# Patient Record
Sex: FEMALE | Race: WHITE | NOT HISPANIC OR LATINO | URBAN - METROPOLITAN AREA
[De-identification: names, ages, dates, MRNs, and addresses within clinical notes are randomized per-mention and may not be internally consistent; named-entity substitution may affect disease eponyms.]

---

## 2022-09-12 ENCOUNTER — INPATIENT (INPATIENT)
Facility: HOSPITAL | Age: 87
LOS: 3 days | Discharge: NURSING, REHAB OR LONG-TERM CARE | End: 2022-09-16
Attending: INTERNAL MEDICINE | Admitting: INTERNAL MEDICINE

## 2022-09-12 VITALS
OXYGEN SATURATION: 100 % | HEART RATE: 100 BPM | TEMPERATURE: 98 F | DIASTOLIC BLOOD PRESSURE: 85 MMHG | SYSTOLIC BLOOD PRESSURE: 127 MMHG | RESPIRATION RATE: 16 BRPM

## 2022-09-12 LAB
ALBUMIN SERPL ELPH-MCNC: 4.1 G/DL — SIGNIFICANT CHANGE UP (ref 3.5–5.2)
ALP SERPL-CCNC: 136 U/L — HIGH (ref 30–115)
ALT FLD-CCNC: <5 U/L — SIGNIFICANT CHANGE UP (ref 0–41)
ANION GAP SERPL CALC-SCNC: 10 MMOL/L — SIGNIFICANT CHANGE UP (ref 7–14)
APPEARANCE UR: ABNORMAL
AST SERPL-CCNC: 64 U/L — HIGH (ref 0–41)
BACTERIA # UR AUTO: NEGATIVE — SIGNIFICANT CHANGE UP
BASOPHILS # BLD AUTO: 0.03 K/UL — SIGNIFICANT CHANGE UP (ref 0–0.2)
BASOPHILS NFR BLD AUTO: 0.2 % — SIGNIFICANT CHANGE UP (ref 0–1)
BILIRUB SERPL-MCNC: 0.7 MG/DL — SIGNIFICANT CHANGE UP (ref 0.2–1.2)
BILIRUB UR-MCNC: NEGATIVE — SIGNIFICANT CHANGE UP
BUN SERPL-MCNC: 19 MG/DL — SIGNIFICANT CHANGE UP (ref 10–20)
C DIFF BY PCR RESULT: NEGATIVE — SIGNIFICANT CHANGE UP
C DIFF TOX GENS STL QL NAA+PROBE: SIGNIFICANT CHANGE UP
CALCIUM SERPL-MCNC: 9.2 MG/DL — SIGNIFICANT CHANGE UP (ref 8.4–10.5)
CHLORIDE SERPL-SCNC: 100 MMOL/L — SIGNIFICANT CHANGE UP (ref 98–110)
CO2 SERPL-SCNC: 24 MMOL/L — SIGNIFICANT CHANGE UP (ref 17–32)
COLOR SPEC: YELLOW — SIGNIFICANT CHANGE UP
CREAT SERPL-MCNC: 1.1 MG/DL — SIGNIFICANT CHANGE UP (ref 0.7–1.5)
DIFF PNL FLD: ABNORMAL
EGFR: 50 ML/MIN/1.73M2 — LOW
EOSINOPHIL # BLD AUTO: 0.02 K/UL — SIGNIFICANT CHANGE UP (ref 0–0.7)
EOSINOPHIL NFR BLD AUTO: 0.1 % — SIGNIFICANT CHANGE UP (ref 0–8)
EPI CELLS # UR: 3 /HPF — SIGNIFICANT CHANGE UP (ref 0–5)
GLUCOSE SERPL-MCNC: 115 MG/DL — HIGH (ref 70–99)
GLUCOSE UR QL: ABNORMAL
HCT VFR BLD CALC: 43.3 % — SIGNIFICANT CHANGE UP (ref 37–47)
HGB BLD-MCNC: 14.7 G/DL — SIGNIFICANT CHANGE UP (ref 12–16)
HYALINE CASTS # UR AUTO: 7 /LPF — SIGNIFICANT CHANGE UP (ref 0–7)
IMM GRANULOCYTES NFR BLD AUTO: 0.4 % — HIGH (ref 0.1–0.3)
KETONES UR-MCNC: SIGNIFICANT CHANGE UP
LACTATE SERPL-SCNC: 1.7 MMOL/L — SIGNIFICANT CHANGE UP (ref 0.7–2)
LEUKOCYTE ESTERASE UR-ACNC: ABNORMAL
LIDOCAIN IGE QN: 34 U/L — SIGNIFICANT CHANGE UP (ref 7–60)
LYMPHOCYTES # BLD AUTO: 0.36 K/UL — LOW (ref 1.2–3.4)
LYMPHOCYTES # BLD AUTO: 2.7 % — LOW (ref 20.5–51.1)
MCHC RBC-ENTMCNC: 29.7 PG — SIGNIFICANT CHANGE UP (ref 27–31)
MCHC RBC-ENTMCNC: 33.9 G/DL — SIGNIFICANT CHANGE UP (ref 32–37)
MCV RBC AUTO: 87.5 FL — SIGNIFICANT CHANGE UP (ref 81–99)
MONOCYTES # BLD AUTO: 0.74 K/UL — HIGH (ref 0.1–0.6)
MONOCYTES NFR BLD AUTO: 5.5 % — SIGNIFICANT CHANGE UP (ref 1.7–9.3)
NEUTROPHILS # BLD AUTO: 12.18 K/UL — HIGH (ref 1.4–6.5)
NEUTROPHILS NFR BLD AUTO: 91.1 % — HIGH (ref 42.2–75.2)
NITRITE UR-MCNC: NEGATIVE — SIGNIFICANT CHANGE UP
NRBC # BLD: 0 /100 WBCS — SIGNIFICANT CHANGE UP (ref 0–0)
PH UR: 6 — SIGNIFICANT CHANGE UP (ref 5–8)
PLATELET # BLD AUTO: 181 K/UL — SIGNIFICANT CHANGE UP (ref 130–400)
POTASSIUM SERPL-MCNC: SIGNIFICANT CHANGE UP MMOL/L (ref 3.5–5)
POTASSIUM SERPL-SCNC: SIGNIFICANT CHANGE UP MMOL/L (ref 3.5–5)
PROT SERPL-MCNC: 7.9 G/DL — SIGNIFICANT CHANGE UP (ref 6–8)
PROT UR-MCNC: ABNORMAL
RBC # BLD: 4.95 M/UL — SIGNIFICANT CHANGE UP (ref 4.2–5.4)
RBC # FLD: 14.9 % — HIGH (ref 11.5–14.5)
RBC CASTS # UR COMP ASSIST: 27 /HPF — HIGH (ref 0–4)
SARS-COV-2 RNA SPEC QL NAA+PROBE: DETECTED
SODIUM SERPL-SCNC: 134 MMOL/L — LOW (ref 135–146)
SP GR SPEC: 1.02 — SIGNIFICANT CHANGE UP (ref 1.01–1.03)
UROBILINOGEN FLD QL: ABNORMAL
WBC # BLD: 13.38 K/UL — HIGH (ref 4.8–10.8)
WBC # FLD AUTO: 13.38 K/UL — HIGH (ref 4.8–10.8)
WBC UR QL: 43 /HPF — HIGH (ref 0–5)

## 2022-09-12 PROCEDURE — 99283 EMERGENCY DEPT VISIT LOW MDM: CPT

## 2022-09-12 PROCEDURE — 99223 1ST HOSP IP/OBS HIGH 75: CPT

## 2022-09-12 PROCEDURE — 74177 CT ABD & PELVIS W/CONTRAST: CPT | Mod: 26,MA

## 2022-09-12 RX ORDER — CIPROFLOXACIN LACTATE 400MG/40ML
400 VIAL (ML) INTRAVENOUS ONCE
Refills: 0 | Status: COMPLETED | OUTPATIENT
Start: 2022-09-12 | End: 2022-09-12

## 2022-09-12 RX ORDER — SODIUM CHLORIDE 9 MG/ML
1000 INJECTION INTRAMUSCULAR; INTRAVENOUS; SUBCUTANEOUS ONCE
Refills: 0 | Status: COMPLETED | OUTPATIENT
Start: 2022-09-12 | End: 2022-09-12

## 2022-09-12 RX ORDER — METRONIDAZOLE 500 MG
500 TABLET ORAL ONCE
Refills: 0 | Status: COMPLETED | OUTPATIENT
Start: 2022-09-12 | End: 2022-09-12

## 2022-09-12 RX ADMIN — SODIUM CHLORIDE 1000 MILLILITER(S): 9 INJECTION INTRAMUSCULAR; INTRAVENOUS; SUBCUTANEOUS at 14:38

## 2022-09-12 RX ADMIN — Medication 100 MILLIGRAM(S): at 18:12

## 2022-09-12 RX ADMIN — Medication 200 MILLIGRAM(S): at 18:12

## 2022-09-12 NOTE — CONSULT NOTE ADULT - SUBJECTIVE AND OBJECTIVE BOX
GENERAL SURGERY CONSULT NOTE    Patient: SULEMA POZO , 86y (39)Female   MRN: [id number]  Location: [facility] 021 A  Visit: 22 Inpatient  Date: 22 @ 20:20    HPI:  87 y/o female unknown medical history presents to ED c/o diarrhea. Patient is a poor historian. reports she takes medicine on a daily basis but is not sure for what. states she lives at home alone. She denies any abdominal pain or back pain. States that she was not aware of the thoracic AA before.     PAST MEDICAL & SURGICAL HISTORY:  unknown    Home Medications:  unknown    VITALS:  T(F): 96.1 (22 @ 15:45), Max: 97.6 (22 @ 13:02)  HR: 96 (22 @ 15:45) (96 - 100)  BP: 145/88 (22 @ 15:45) (127/85 - 145/88)  RR: 16 (22 @ 15:45) (16 - 16)  SpO2: 96% (22 @ 15:45) (96% - 100%)    PHYSICAL EXAM:  General: NAD,  Cardiac: RRR S1, S2,  Respiratory: CTAB, normal respiratory effort, breath sounds equal BL,   Abdomen: Soft, non-distended, non-tender,   Vascular: Pulses 2+ throughout, extremities well perfused  Skin: Warm/dry, normal color, no jaundice    MEDICATIONS  (STANDING):    MEDICATIONS  (PRN):      LAB/STUDIES:                        14.7   13.38 )-----------( 181      ( 12 Sep 2022 14:58 )             43.3         134<L>  |  100  |  19  ----------------------------<  115<H>  TNP   |  24  |  1.1    Ca    9.2      12 Sep 2022 14:58    TPro  7.9  /  Alb  4.1  /  TBili  0.7  /  DBili  x   /  AST  64<H>  /  ALT  <5  /  AlkPhos  136<H>  12      LIVER FUNCTIONS - ( 12 Sep 2022 14:58 )  Alb: 4.1 g/dL / Pro: 7.9 g/dL / ALK PHOS: 136 U/L / ALT: <5 U/L / AST: 64 U/L / GGT: x           Urinalysis Basic - ( 12 Sep 2022 14:49 )    Color: Yellow / Appearance: Slightly Turbid / S.018 / pH: x  Gluc: x / Ketone: Trace  / Bili: Negative / Urobili: 3 mg/dL   Blood: x / Protein: 100 mg/dL / Nitrite: Negative   Leuk Esterase: Large / RBC: 27 /HPF / WBC 43 /HPF   Sq Epi: x / Non Sq Epi: 3 /HPF / Bacteria: Negative    IMAGING:    < from: CT Abdomen and Pelvis w/ IV Cont (22 @ 15:31) >  IMPRESSION:    Colitis involving the descending and sigmoid colon.    Patchy hypoenhancement of the left kidney. Differential includes acute   pyelonephritis versus renal infarcts. Clinical correlation is needed.    Partially imaged descending thoracic aorta is aneurysmal measuring up to   5.7 cm. Abdominal aorta is also aneurysmal measuring up to 4.3 cm.   Extensive mural plaque is noted throughout the aorta. Recommend chest CT   follow-up to fully assess the thoracic aorta.    Partially imaged moderate pericardial effusion.    Partially imaged bilateral pleural effusions with compressive atelectasis.    Hypoattenuating rounded 1.4 cm uterine lesion, indeterminate. Bilateral   adnexal cysts measuring up to 3.2 cm on the right. Recommend evaluation   with pelvic sonography.    Indeterminate 1.5 cm hypoattenuating splenic lesion. This can be followed   up with outpatient contrast-enhanced MRI.    < end of copied text >    ACCESS DEVICES:  [x ] Peripheral IV  [ ] Central Venous Line	[ ] R	[ ] L	[ ] IJ	[ ] Fem	[ ] SC	Placed:   [ ] Arterial Line		[ ] R	[ ] L	[ ] Fem	[ ] Rad	[ ] Ax	Placed:   [ ] PICC:					[ ] Mediport  [ ] Urinary Catheter, Date Placed:

## 2022-09-12 NOTE — CONSULT NOTE ADULT - ASSESSMENT
ASSESSMENT:  87 y/o female unknown medical history presents to ED c/o diarrhea.   She was found to have colitis.   CTAP also shows  thoracic aortic aneurysm 5.7cm, Abdominal aorta is also aneurysmal measuring up to 4.3 cm.     PLAN:  - medical management for colitis   - attending to review the imaging   - will continue to follow     Above plan discussed with vascular fellow Dr Heller, patient, and Primary team  09-12-22 @ 20:20

## 2022-09-12 NOTE — ED PROVIDER NOTE - OBJECTIVE STATEMENT
86-year-old female to ED with abdominal pain nausea vomiting and diarrhea.  Patient brought into ED for evaluation at this persistent symptoms.  No fevers no sick contacts no injury or fall patient states she has been and has been having worsening symptoms over the called ambulance to come to the hospital.

## 2022-09-13 LAB
A1C WITH ESTIMATED AVERAGE GLUCOSE RESULT: 5.3 % — SIGNIFICANT CHANGE UP (ref 4–5.6)
ALBUMIN SERPL ELPH-MCNC: 3.6 G/DL — SIGNIFICANT CHANGE UP (ref 3.5–5.2)
ALP SERPL-CCNC: 127 U/L — HIGH (ref 30–115)
ALT FLD-CCNC: 10 U/L — SIGNIFICANT CHANGE UP (ref 0–41)
ANION GAP SERPL CALC-SCNC: 11 MMOL/L — SIGNIFICANT CHANGE UP (ref 7–14)
AST SERPL-CCNC: 17 U/L — SIGNIFICANT CHANGE UP (ref 0–41)
BASOPHILS # BLD AUTO: 0.04 K/UL — SIGNIFICANT CHANGE UP (ref 0–0.2)
BASOPHILS NFR BLD AUTO: 0.6 % — SIGNIFICANT CHANGE UP (ref 0–1)
BILIRUB SERPL-MCNC: 0.7 MG/DL — SIGNIFICANT CHANGE UP (ref 0.2–1.2)
BUN SERPL-MCNC: 14 MG/DL — SIGNIFICANT CHANGE UP (ref 10–20)
CALCIUM SERPL-MCNC: 8.5 MG/DL — SIGNIFICANT CHANGE UP (ref 8.4–10.5)
CHLORIDE SERPL-SCNC: 104 MMOL/L — SIGNIFICANT CHANGE UP (ref 98–110)
CHOLEST SERPL-MCNC: 195 MG/DL — SIGNIFICANT CHANGE UP
CO2 SERPL-SCNC: 24 MMOL/L — SIGNIFICANT CHANGE UP (ref 17–32)
CREAT SERPL-MCNC: 0.9 MG/DL — SIGNIFICANT CHANGE UP (ref 0.7–1.5)
CRP SERPL-MCNC: 28.8 MG/L — HIGH
D DIMER BLD IA.RAPID-MCNC: 7717 NG/ML DDU — HIGH (ref 0–230)
EGFR: 63 ML/MIN/1.73M2 — SIGNIFICANT CHANGE UP
EOSINOPHIL # BLD AUTO: 0.11 K/UL — SIGNIFICANT CHANGE UP (ref 0–0.7)
EOSINOPHIL NFR BLD AUTO: 1.5 % — SIGNIFICANT CHANGE UP (ref 0–8)
ERYTHROCYTE [SEDIMENTATION RATE] IN BLOOD: 5 MM/HR — SIGNIFICANT CHANGE UP (ref 0–20)
ESTIMATED AVERAGE GLUCOSE: 105 MG/DL — SIGNIFICANT CHANGE UP (ref 68–114)
FERRITIN SERPL-MCNC: 212 NG/ML — HIGH (ref 15–150)
GLUCOSE SERPL-MCNC: 118 MG/DL — HIGH (ref 70–99)
HCT VFR BLD CALC: 38.7 % — SIGNIFICANT CHANGE UP (ref 37–47)
HDLC SERPL-MCNC: 59 MG/DL — SIGNIFICANT CHANGE UP
HGB BLD-MCNC: 12.8 G/DL — SIGNIFICANT CHANGE UP (ref 12–16)
IMM GRANULOCYTES NFR BLD AUTO: 0.4 % — HIGH (ref 0.1–0.3)
LACTATE SERPL-SCNC: 1.3 MMOL/L — SIGNIFICANT CHANGE UP (ref 0.7–2)
LDH SERPL L TO P-CCNC: 289 — HIGH (ref 50–242)
LIPID PNL WITH DIRECT LDL SERPL: 123 MG/DL — HIGH
LYMPHOCYTES # BLD AUTO: 0.6 K/UL — LOW (ref 1.2–3.4)
LYMPHOCYTES # BLD AUTO: 8.4 % — LOW (ref 20.5–51.1)
MAGNESIUM SERPL-MCNC: 1.9 MG/DL — SIGNIFICANT CHANGE UP (ref 1.8–2.4)
MCHC RBC-ENTMCNC: 29.3 PG — SIGNIFICANT CHANGE UP (ref 27–31)
MCHC RBC-ENTMCNC: 33.1 G/DL — SIGNIFICANT CHANGE UP (ref 32–37)
MCV RBC AUTO: 88.6 FL — SIGNIFICANT CHANGE UP (ref 81–99)
MONOCYTES # BLD AUTO: 0.66 K/UL — HIGH (ref 0.1–0.6)
MONOCYTES NFR BLD AUTO: 9.2 % — SIGNIFICANT CHANGE UP (ref 1.7–9.3)
NEUTROPHILS # BLD AUTO: 5.71 K/UL — SIGNIFICANT CHANGE UP (ref 1.4–6.5)
NEUTROPHILS NFR BLD AUTO: 79.9 % — HIGH (ref 42.2–75.2)
NON HDL CHOLESTEROL: 136 MG/DL — HIGH
NRBC # BLD: 0 /100 WBCS — SIGNIFICANT CHANGE UP (ref 0–0)
NT-PROBNP SERPL-SCNC: 975 PG/ML — HIGH (ref 0–300)
PLATELET # BLD AUTO: 167 K/UL — SIGNIFICANT CHANGE UP (ref 130–400)
POTASSIUM SERPL-MCNC: 4.1 MMOL/L — SIGNIFICANT CHANGE UP (ref 3.5–5)
POTASSIUM SERPL-SCNC: 4.1 MMOL/L — SIGNIFICANT CHANGE UP (ref 3.5–5)
PROT SERPL-MCNC: 6.3 G/DL — SIGNIFICANT CHANGE UP (ref 6–8)
RBC # BLD: 4.37 M/UL — SIGNIFICANT CHANGE UP (ref 4.2–5.4)
RBC # FLD: 14.3 % — SIGNIFICANT CHANGE UP (ref 11.5–14.5)
SODIUM SERPL-SCNC: 139 MMOL/L — SIGNIFICANT CHANGE UP (ref 135–146)
TRIGL SERPL-MCNC: 64 MG/DL — SIGNIFICANT CHANGE UP
TROPONIN T SERPL-MCNC: <0.01 NG/ML — SIGNIFICANT CHANGE UP
WBC # BLD: 7.15 K/UL — SIGNIFICANT CHANGE UP (ref 4.8–10.8)
WBC # FLD AUTO: 7.15 K/UL — SIGNIFICANT CHANGE UP (ref 4.8–10.8)

## 2022-09-13 PROCEDURE — 99223 1ST HOSP IP/OBS HIGH 75: CPT

## 2022-09-13 PROCEDURE — 76856 US EXAM PELVIC COMPLETE: CPT | Mod: 26

## 2022-09-13 PROCEDURE — 93010 ELECTROCARDIOGRAM REPORT: CPT

## 2022-09-13 PROCEDURE — 71275 CT ANGIOGRAPHY CHEST: CPT | Mod: 26

## 2022-09-13 PROCEDURE — 93306 TTE W/DOPPLER COMPLETE: CPT | Mod: 26

## 2022-09-13 PROCEDURE — 99221 1ST HOSP IP/OBS SF/LOW 40: CPT

## 2022-09-13 PROCEDURE — 71045 X-RAY EXAM CHEST 1 VIEW: CPT | Mod: 26

## 2022-09-13 PROCEDURE — 99497 ADVNCD CARE PLAN 30 MIN: CPT | Mod: 25

## 2022-09-13 PROCEDURE — 71045 X-RAY EXAM CHEST 1 VIEW: CPT | Mod: 26,77

## 2022-09-13 PROCEDURE — 74178 CT ABD&PLV WO CNTR FLWD CNTR: CPT | Mod: 26

## 2022-09-13 PROCEDURE — 93970 EXTREMITY STUDY: CPT | Mod: 26

## 2022-09-13 RX ORDER — LANOLIN ALCOHOL/MO/W.PET/CERES
5 CREAM (GRAM) TOPICAL AT BEDTIME
Refills: 0 | Status: DISCONTINUED | OUTPATIENT
Start: 2022-09-13 | End: 2022-09-16

## 2022-09-13 RX ORDER — LABETALOL HCL 100 MG
100 TABLET ORAL DAILY
Refills: 0 | Status: DISCONTINUED | OUTPATIENT
Start: 2022-09-13 | End: 2022-09-16

## 2022-09-13 RX ORDER — METRONIDAZOLE 500 MG
500 TABLET ORAL EVERY 8 HOURS
Refills: 0 | Status: DISCONTINUED | OUTPATIENT
Start: 2022-09-13 | End: 2022-09-15

## 2022-09-13 RX ORDER — PANTOPRAZOLE SODIUM 20 MG/1
40 TABLET, DELAYED RELEASE ORAL
Refills: 0 | Status: DISCONTINUED | OUTPATIENT
Start: 2022-09-13 | End: 2022-09-16

## 2022-09-13 RX ORDER — ENOXAPARIN SODIUM 100 MG/ML
40 INJECTION SUBCUTANEOUS EVERY 24 HOURS
Refills: 0 | Status: DISCONTINUED | OUTPATIENT
Start: 2022-09-13 | End: 2022-09-16

## 2022-09-13 RX ORDER — CEFTRIAXONE 500 MG/1
1000 INJECTION, POWDER, FOR SOLUTION INTRAMUSCULAR; INTRAVENOUS EVERY 24 HOURS
Refills: 0 | Status: DISCONTINUED | OUTPATIENT
Start: 2022-09-13 | End: 2022-09-15

## 2022-09-13 RX ORDER — ACETAMINOPHEN 500 MG
650 TABLET ORAL EVERY 6 HOURS
Refills: 0 | Status: DISCONTINUED | OUTPATIENT
Start: 2022-09-13 | End: 2022-09-16

## 2022-09-13 RX ORDER — HEPARIN SODIUM 5000 [USP'U]/ML
5000 INJECTION INTRAVENOUS; SUBCUTANEOUS EVERY 8 HOURS
Refills: 0 | Status: DISCONTINUED | OUTPATIENT
Start: 2022-09-13 | End: 2022-09-13

## 2022-09-13 RX ORDER — LABETALOL HCL 100 MG
10 TABLET ORAL ONCE
Refills: 0 | Status: COMPLETED | OUTPATIENT
Start: 2022-09-13 | End: 2022-09-13

## 2022-09-13 RX ORDER — LABETALOL HCL 100 MG
100 TABLET ORAL DAILY
Refills: 0 | Status: DISCONTINUED | OUTPATIENT
Start: 2022-09-13 | End: 2022-09-13

## 2022-09-13 RX ORDER — SODIUM CHLORIDE 9 MG/ML
1000 INJECTION INTRAMUSCULAR; INTRAVENOUS; SUBCUTANEOUS
Refills: 0 | Status: COMPLETED | OUTPATIENT
Start: 2022-09-13 | End: 2022-09-13

## 2022-09-13 RX ADMIN — ENOXAPARIN SODIUM 40 MILLIGRAM(S): 100 INJECTION SUBCUTANEOUS at 14:16

## 2022-09-13 RX ADMIN — Medication 650 MILLIGRAM(S): at 23:13

## 2022-09-13 RX ADMIN — PANTOPRAZOLE SODIUM 40 MILLIGRAM(S): 20 TABLET, DELAYED RELEASE ORAL at 06:38

## 2022-09-13 RX ADMIN — HEPARIN SODIUM 5000 UNIT(S): 5000 INJECTION INTRAVENOUS; SUBCUTANEOUS at 06:39

## 2022-09-13 RX ADMIN — Medication 10 MILLIGRAM(S): at 09:25

## 2022-09-13 RX ADMIN — Medication 100 MILLIGRAM(S): at 21:53

## 2022-09-13 RX ADMIN — Medication 100 MILLIGRAM(S): at 14:16

## 2022-09-13 RX ADMIN — CEFTRIAXONE 100 MILLIGRAM(S): 500 INJECTION, POWDER, FOR SOLUTION INTRAMUSCULAR; INTRAVENOUS at 11:38

## 2022-09-13 RX ADMIN — Medication 100 MILLIGRAM(S): at 11:03

## 2022-09-13 RX ADMIN — Medication 100 MILLIGRAM(S): at 06:38

## 2022-09-13 NOTE — CONSULT NOTE ADULT - NS ATTEND AMEND GEN_ALL_CORE FT
The patient is an 86-year-old lady we were asked to evaluate when the abdominal CT scan showed that she had a descending aortic aneurysm with a small pericardial effusion and a left pleural effusion.    The patient is an elderly female, who appears older than her stated age who was lying in bed comfortably when we went to evaluate her.    She is not the best historian and tells us that she has had chronic back pain for many months and apparently knew that she had an aortic aneurysm for many years and the chest.    She has had no real formal follow-up work-up for the same.    She denies any history of acute chest pain or back pain suggestive of an acute event.  She has multiple vague complaints as described above but currently says that she feels at her baseline.    She was also found to be COVID-positive during this admission.    I had a chance to review the CT scan of her abdomen which has some cuts going into the chest and it does show an aneurysm in the descending thoracic aorta, possibly even an old type B dissection with a hematoma very difficult to assess.    Clinically it does not appear to be an acute aortic dissection but she will need a formal study of the entire aorta including the entire thoracic aorta to make any further determinations.    Given her advanced age, clinical frailty and other medical comorbidities she would not be a candidate for high risk open heart surgery at the present time.    We will have to await the results of the formal CT scan to make any further recommendations.    She should be evaluated by the vascular team
86 year old with extensive thoracic aortic aneurysm. Pt needs BP control and cardiac clearance.     Pt may be endograft candidate. Will follow.

## 2022-09-13 NOTE — H&P ADULT - HISTORY OF PRESENT ILLNESS
86F with no known PMHx presents to the ED for evaluation of abd pain, watery diarrhea, and NBNB vomiting. The pt is a poor historian, no family at bedside; lives alone at home and her  passed away. She presents today with persistent watery diarrhea and NBNB vomiting of unclear duration. She denies any recent sick contacts, food from outside, recent travel, or recent infections. She is forgetful and doesn't know exactly why she's there at the time of my exam. She denies any CP, SOB, palpitations, headaches, blurry vision, abd pain, constipation, dysuria, incontinence, or any  symptoms.    In the ED: /85 bilaterally, , T 97.6F, RR 16 satting 100% on RA. Labs notable for WBC 13k (91% N), Cr 1.1 BUN 19 (no baseline). Elevated but hemolyzed ALP and AST. UA +ve for large LE and pyuria.     CT AP shows   < from: CT Abdomen and Pelvis w/ IV Cont (09.12.22 @ 15:31) >  Colitis involving the descending and sigmoid colon.    Patchy hypoenhancement of the left kidney. Differential includes acute   pyelonephritis versus renal infarcts. Clinical correlation is needed.    Partially imaged descending thoracic aorta is aneurysmal measuring up to   5.7 cm. Abdominal aorta is also aneurysmal measuring up to 4.3 cm.   Extensive mural plaque is noted throughout the aorta. Recommend chest CT   follow-up to fully assess the thoracic aorta.    Partially imaged moderate pericardial effusion.    Partially imaged bilateral pleural effusions with compressive atelectasis.    Hypoattenuating rounded 1.4 cm uterine lesion, indeterminate. Bilateral   adnexal cysts measuring up to 3.2 cm on the right. Recommend evaluation   with pelvic sonography.    Indeterminate 1.5 cm hypoattenuating splenic lesion. This can be followed   up with outpatient contrast-enhanced MRI.    < end of copied text >    She was also found to have Covid-19. She was given 1L LR, cipro, and flagyl, and admitted to medicine.

## 2022-09-13 NOTE — CONSULT NOTE ADULT - ASSESSMENT
IMPRESSION:  Large descending thoracic aorta aneurysm, with intramural hematoma and chronic aortic dissection  B/l small pleural effusions  Acute colitis  Pericardial effusion  COVID-19 infection      PLAN:    CNS: Keep off depressants    HEENT: Oral care    PULMONARY: CT shows new b/l small pleural effusions, asymptomatic. HOB @ 45 degrees. Aspiration precautions     CARDIOVASCULAR: CT shows large descending thoracic aorta aneurysm, with intramural hematoma and chronic aortic dissection. Per CT surgery and cardiology aortic dissection appears chronic in nature. Per cardiology increase labetolol to 100 BID and obtain echo. Per CT surgery obtain CT chest aorta study w/ IV contrast. Vascular is following as well.    GI: Feeding as tolerated    RENAL: Follow up lytes. Correct as needed    INFECTIOUS DISEASE: CT shows acute colitis. Can treat with ciprofloxacin and flagyl. Follow up blood and stool cultures    HEMATOLOGICAL: DVT prophylaxis: Lovenox    ENDOCRINE:  Follow up FS.  Insulin protocol if needed.    MUSCULOSKELETAL: Ambulate as tolerated    DISPO: Patient is hemodynamically stable and there is no indication for MICU monitoring. Can transfer to tele as per cardio.         IMPRESSION:  Large descending thoracic aorta aneurysm, with intramural hematoma and chronic aortic dissection  B/l small pleural effusions  Acute colitis  Pericardial effusion  COVID-19 infection      PLAN:    CNS: Keep off depressants    HEENT: Oral care    PULMONARY: CT shows new b/l small pleural effusions, asymptomatic. HOB @ 45 degrees. Aspiration precautions     CARDIOVASCULAR: CT shows large descending thoracic aorta aneurysm, with intramural hematoma and chronic aortic dissection. Per CT surgery and cardiology aortic dissection appears chronic in nature. Per cardiology increase labetolol to 100 BID and obtain echo. Vascular is following as well.    GI: Feeding as tolerated    RENAL: Follow up lytes. Correct as needed    INFECTIOUS DISEASE: CT shows acute colitis. Can treat with ciprofloxacin and flagyl. Follow up blood and stool cultures    HEMATOLOGICAL: DVT prophylaxis: Lovenox    ENDOCRINE:  Follow up FS.  Insulin protocol if needed.    MUSCULOSKELETAL: Ambulate as tolerated    DISPO:        IMPRESSION:  Large descending thoracic aorta aneurysm, with intramural hematoma and chronic aortic dissection  B/l small pleural effusions  Acute colitis  Pericardial effusion  COVID-19 infection      PLAN:    CNS: Keep off depressants    HEENT: Oral care    PULMONARY: CT shows new b/l small pleural effusions, asymptomatic. HOB @ 45 degrees. Aspiration precautions     CARDIOVASCULAR: CT shows large descending thoracic aorta aneurysm, with intramural hematoma and chronic aortic dissection. Per CT surgery and cardiology aortic dissection appears chronic in nature. Per cardiology increase labetolol to 100 BID and obtain echo. Vascular is following as well.    GI: Feeding as tolerated    RENAL: Follow up lytes. Correct as needed    INFECTIOUS DISEASE: CT shows acute colitis. Can treat with ciprofloxacin and flagyl. Follow up blood and stool cultures    HEMATOLOGICAL: DVT prophylaxis: Lovenox    ENDOCRINE:  Follow up FS.  Insulin protocol if needed.    MUSCULOSKELETAL: Ambulate as tolerated    DISPO: SDU       IMPRESSION:  Large descending thoracic aorta aneurysm, with intramural hematoma and chronic aortic dissection  B/l small pleural effusions - left more than right   Acute colitis  Pericardial effusion  COVID-19 infection      PLAN:    CNS: Keep off depressants    HEENT: Oral care    PULMONARY: CT shows new b/l small pleural effusions L>R, asymptomatic. HOB @ 45 degrees. Aspiration precautions     CARDIOVASCULAR: CT shows large descending thoracic aorta aneurysm, with intramural hematoma and chronic aortic dissection. Per CT surgery and cardiology aortic dissection appears chronic in nature. BP and HR controlled with labetalol PO. Currently not on drips. CTS and vascular surgery consulted. Follow their recommendations. BNP is elevated. Echocardiogram for the pericardial effusion.     GI: Feeding as tolerated.     RENAL: Follow up lytes. Correct as needed    INFECTIOUS DISEASE: CT shows acute colitis. Follow up blood and stool cultures. No leukocytosis. Check procalcitonin and if negative then no need for abx.     HEMATOLOGICAL: DVT prophylaxis: Lovenox SQ.    ENDOCRINE:  Follow up FS.  Insulin protocol if needed.    MUSCULOSKELETAL: Ambulate as tolerated    DISPO: Telemetry for now.        IMPRESSION:    Large descending thoracic aorta aneurysm, with intramural hematoma  Aortic dissection  B/l pleural effusions - left more than right   Acute colitis  Pericardial effusion - moderate on CT   COVID-19 infection      PLAN:    CNS: Keep off depressants    HEENT: Oral care    PULMONARY: CT shows new b/l small pleural effusions L>R, asymptomatic. HOB @ 45 degrees. Aspiration precautions. Keep O2 saturation more than 92%. Currently on room air. No indications for thoracentesis.     CARDIOVASCULAR: CT shows large descending thoracic aorta aneurysm, with intramural hematoma and aortic dissection. Per CT surgery and cardiology aortic dissection appears chronic in nature. BP and HR controlled with labetalol PO. Currently not on drips. CTS and vascular surgery consulted. Follow their recommendations. BNP is elevated. Echocardiogram to evaluate the pericardial effusion.     GI: Feeding as tolerated. Aspiration precautions.     RENAL: Follow up lytes. Correct as needed    INFECTIOUS DISEASE: CT shows acute colitis. Follow up blood and stool cultures. No leukocytosis. Check procalcitonin and if negative then no need for abx.     HEMATOLOGICAL: DVT prophylaxis: Lovenox SQ.     ENDOCRINE:  Follow up FS.  Insulin protocol if needed.    MUSCULOSKELETAL: Ambulate as tolerated    DISPO: Monitor in telemetry for now.

## 2022-09-13 NOTE — GOALS OF CARE CONVERSATION - ADVANCED CARE PLANNING - CONVERSATION DETAILS
Pt has severe descending thoracic any rsim with dissection. Elderly. Goals of Care Conversation done with pt. Discussed FULL CODE VS DNR/DNI. CPR and intubation discussed with Pt and agreed to Both. Pt wishes to be FULL code. Pt is AAOX3.  All questions answered. Pt has severe descending thoracic anuersim with dissection.  Elderly. Explained to her her situation with jona needing surgery, She was able to understand and relay back to me all her problems. She is AOX3 and is amenable to surgery.  Goals of Care Conversation done with pt. Discussed FULL CODE VS DNR/DNI. CPR and intubation discussed with Pt and agreed to Both. Pt wishes to be FULL code. Pt is AAOX3.  All questions answered.

## 2022-09-13 NOTE — CHART NOTE - NSCHARTNOTEFT_GEN_A_CORE
Pt seen by nabor in am.   Pt seen and examined at bedside. vitals and exam stable. Chart reviewed. Discussed plan with resident.   IMPRESSION:    1. Severe aneurysmal dilatation of the descending thoracic aorta,   measuring 6.3 x 5.9 cm at its mid-portion, with evidence of intramural   hematoma, possibly subacute, versus chronic dissection with a thrombosed   false lumen. Aortic arch measures 5.6 cm. Descending thoracic aorta   measures 4.5 cm at the level of the diaphragmatic hiatus.    2. Since September 12, 2022, moderate size left and small right pleural   effusion; unchanged moderate pericardial effusion.    3. No significant change in hypoenhancement involving the left renal   interpolar region and left upper pole, possibly reflecting developing   infarct versus acute pyelonephritis.    4. Remainder of findings are overall unchanged on this short-term   follow-up exam.    #thoracic aneurism with sub acute or chronic dissection. Thrombus  - CTS consulted No intervention  - Vascular consulted pt will need OR intervention this week, keep BP <120 as per vascular, started on labatol 100 mg po daily, was given labtolol 10 mg Push as BP was 140s today, reccs given by Dr. Costa today, vasular note not completed   - Cardio clearance   -check echo  - CC consulted- not upgrade candidate     #pericardial effusion- check echo     #Covid 19- pt asymptomatic- no time line- ID for therapy.     #colitis  #Pyelonephriotis  - CTX and flagyl  - ID consult      #Progress Note Handoff  Pending (specify):  follow up vascular, CTS, ID,   Family discussion: unable to reach family, SW consulted  Disposition: >72 hrs  Full code
Notified by RN that patient had vitals Q2hrs. Patient's SBP have been <120 since this afternoon. SBS of 122 noted at 12:30PM. d/michael provider to rn of vitals Q2 and resumed vitals Q8 with appropriate BP control.
Spoke to pt Janett Brownlee at [phone] and updated her on the case. Please call her for updates. I explained to her during the interview the pt  was able to understand and relay back to me all her problems. She is AOX3 and is amenable to surgery.  There has been concern for deterioration at home and possible dementia but at the moment the pt seemed coherent and understood. Pt was also seen by Dr. Callahan and agreed.

## 2022-09-13 NOTE — CONSULT NOTE ADULT - ATTENDING COMMENTS
IMPRESSION:  Large descending thoracic aorta aneurysm, with intramural hematoma and chronic aortic dissection  B/l small pleural effusions - left more than right   Acute colitis  Pericardial effusion  COVID-19 infection    Impression and plan are my own.
Cardiology consulted for preop clearance for possible surgery.    - High-risk for MACE  - Labetalol for BP control  - Start Atorvastatin  - Consider A-line for BP management  - Consider thoracentesis for left pleural effusion

## 2022-09-13 NOTE — CONSULT NOTE ADULT - SUBJECTIVE AND OBJECTIVE BOX
HPI:  86F with no known PMHx presents to the ED for evaluation of abd pain, watery diarrhea, and NBNB vomiting. The pt is a poor historian, no family at bedside; lives alone at home and her  passed away. She presents today with persistent watery diarrhea and NBNB vomiting of unclear duration. She denies any recent sick contacts, food from outside, recent travel, or recent infections. She is forgetful and doesn't know exactly why she's there at the time of my exam. She denies any CP, SOB, palpitations, headaches, blurry vision, abd pain, constipation, dysuria, incontinence, or any  symptoms.  In the ED: /85 bilaterally, , T 97.6F, RR 16 satting 100% on RA. Labs notable for WBC 13k (91% N), Cr 1.1 BUN 19 (no baseline). Elevated but hemolyzed ALP and AST. UA +ve for large LE and pyuria.   CT AP shows   < from: CT Abdomen and Pelvis w/ IV Cont (09.12.22 @ 15:31) >  Colitis involving the descending and sigmoid colon.    Patchy hypoenhancement of the left kidney. Differential includes acute   pyelonephritis versus renal infarcts. Clinical correlation is needed.  Partially imaged descending thoracic aorta is aneurysmal measuring up to   5.7 cm. Abdominal aorta is also aneurysmal measuring up to 4.3 cm.   Extensive mural plaque is noted throughout the aorta. Recommend chest CT   follow-up to fully assess the thoracic aorta.  Partially imaged moderate pericardial effusion.  Partially imaged bilateral pleural effusions with compressive atelectasis.  Hypoattenuating rounded 1.4 cm uterine lesion, indeterminate. Bilateral   adnexal cysts measuring up to 3.2 cm on the right. Recommend evaluation   with pelvic sonography.  Indeterminate 1.5 cm hypoattenuating splenic lesion. This can be followed   up with outpatient contrast-enhanced MRI.  < end of copied text >  She was also found to have Covid-19. She was given 1L LR, cipro, and flagyl, and admitted to medicine. (13 Sep 2022 00:45)    Cardiology is consulted for preoperative risk assessment before vascular intervention       PAST MEDICAL & SURGICAL HISTORY  Denies     FAMILY HISTORY:  FAMILY HISTORY:      SOCIAL HISTORY:  []smoker  []Alcohol  []Drug    ALLERGIES:  Allergy Status Unknown      MEDICATIONS:  MEDICATIONS  (STANDING):  cefTRIAXone   IVPB 1000 milliGRAM(s) IV Intermittent every 24 hours  heparin   Injectable 5000 Unit(s) SubCutaneous every 8 hours  labetalol 100 milliGRAM(s) Oral daily  metroNIDAZOLE  IVPB 500 milliGRAM(s) IV Intermittent every 8 hours  pantoprazole    Tablet 40 milliGRAM(s) Oral before breakfast  sodium chloride 0.9%. 1000 milliLiter(s) (60 mL/Hr) IV Continuous <Continuous>    MEDICATIONS  (PRN):  acetaminophen     Tablet .. 650 milliGRAM(s) Oral every 6 hours PRN Temp greater or equal to 38C (100.4F), Mild Pain (1 - 3)  melatonin 5 milliGRAM(s) Oral at bedtime PRN Insomnia      HOME MEDICATIONS:  Home Medications:      VITALS:   T(F): 98.4 (09-13 @ 07:30), Max: 98.4 (09-13 @ 07:30)  HR: 86 (09-13 @ 07:30) (86 - 100)  BP: 116/46 (09-13 @ 10:32) (116/46 - 147/97)  BP(mean): 116 (09-13 @ 07:30) (116 - 116)  RR: 18 (09-13 @ 07:30) (16 - 18)  SpO2: 96% (09-13 @ 07:30) (96% - 100%)    I&O's Summary    13 Sep 2022 07:01  -  13 Sep 2022 11:32  --------------------------------------------------------  IN: 0 mL / OUT: 150 mL / NET: -150 mL        REVIEW OF SYSTEMS:  CONSTITUTIONAL: No weakness, fevers or chills  EYES: No visual changes  ENT: No vertigo or throat pain   NECK: No pain or stiffness  RESPIRATORY: No cough, wheezing, hemoptysis; No shortness of breath  CARDIOVASCULAR: No chest pain or palpitations  GASTROINTESTINAL: Positive for waterry diarrhea, NBNB vomiting and abdominal pain  GENITOURINARY: No dysuria, frequency or hematuria  NEUROLOGICAL: No numbness or weakness  SKIN: No itching, no rashes  MSK: No pain    PHYSICAL EXAM:  NEURO: patient is awake , alert and oriented  GEN: Not in acute distress  NECK: no thyroid enlargement, no JVD  LUNGS: Clear to auscultation bilaterally   CARDIOVASCULAR: S1/S2 present, RRR , no murmurs or rubs, no carotid bruits,  + PP bilaterally  ABD: Soft, non-tender, non-distended, +BS  EXT: No FABI  SKIN: Intact    LABS:                        12.8   7.15  )-----------( 167      ( 13 Sep 2022 07:34 )             38.7     09-13    139  |  104  |  14  ----------------------------<  118<H>  4.1   |  24  |  0.9    Ca    8.5      13 Sep 2022 07:34  Mg     1.9     09-13    TPro  6.3  /  Alb  3.6  /  TBili  0.7  /  DBili  x   /  AST  17  /  ALT  10  /  AlkPhos  127<H>  09-13      Troponin T, Serum: <0.01 ng/mL (09-13-22 @ 07:34)  Sedimentation Rate, Erythrocyte: 5 mm/Hr (09-13-22 @ 07:34)  Lactate, Blood: 1.3 mmol/L (09-13-22 @ 07:34)  Lactate, Blood: 1.7 mmol/L (09-12-22 @ 14:58)    CARDIAC MARKERS ( 13 Sep 2022 07:34 )  x     / <0.01 ng/mL / x     / x     / x            Troponin trend:    Serum Pro-Brain Natriuretic Peptide: 975 pg/mL (09-13-22 @ 07:34)    09-13 Chol 195 LDL -- HDL 59 Trig 64      RADIOLOGY:  -CXR:  < from: Xray Chest 1 View- PORTABLE-Urgent (Xray Chest 1 View- PORTABLE-Urgent .) (09.13.22 @ 01:42) >    Enlarged left upper mediastinal silhouette, may represent dilated   thoracic aorta.  Pericardial effusion better seen on CT.  Bilateral pleural effusions.    --- End of Report ---    < end of copied text >      -TTE:  pending     ECG:         HPI:  86F with no known PMHx presents to the ED for evaluation of abd pain, watery diarrhea, and NBNB vomiting. The pt is a poor historian, no family at bedside; lives alone at home and her  passed away. She presents today with persistent watery diarrhea and NBNB vomiting of unclear duration. She denies any recent sick contacts, food from outside, recent travel, or recent infections. She is forgetful and doesn't know exactly why she's there at the time of my exam. She denies any CP, SOB, palpitations, headaches, blurry vision, abd pain, constipation, dysuria, incontinence, or any  symptoms.  In the ED: /85 bilaterally, , T 97.6F, RR 16 satting 100% on RA. Labs notable for WBC 13k (91% N), Cr 1.1 BUN 19 (no baseline). Elevated but hemolyzed ALP and AST. UA +ve for large LE and pyuria.   CT AP shows   < from: CT Abdomen and Pelvis w/ IV Cont (09.12.22 @ 15:31) >  Colitis involving the descending and sigmoid colon.    Patchy hypoenhancement of the left kidney. Differential includes acute   pyelonephritis versus renal infarcts. Clinical correlation is needed.  Partially imaged descending thoracic aorta is aneurysmal measuring up to   5.7 cm. Abdominal aorta is also aneurysmal measuring up to 4.3 cm.   Extensive mural plaque is noted throughout the aorta. Recommend chest CT   follow-up to fully assess the thoracic aorta.  Partially imaged moderate pericardial effusion.  Partially imaged bilateral pleural effusions with compressive atelectasis.  Hypoattenuating rounded 1.4 cm uterine lesion, indeterminate. Bilateral   adnexal cysts measuring up to 3.2 cm on the right. Recommend evaluation   with pelvic sonography.  Indeterminate 1.5 cm hypoattenuating splenic lesion. This can be followed   up with outpatient contrast-enhanced MRI.  < end of copied text >  She was also found to have Covid-19. She was given 1L LR, cipro, and flagyl, and admitted to medicine. (13 Sep 2022 00:45)    Cardiology is consulted for preoperative risk assessment before vascular intervention. patient seen and examined   patient is confused with flight of ideas, unable to obtain accurate history.       PAST MEDICAL & SURGICAL HISTORY  Denies     FAMILY HISTORY:  FAMILY HISTORY:      SOCIAL HISTORY:  []smoker  []Alcohol  []Drug    ALLERGIES:  Allergy Status Unknown      MEDICATIONS:  MEDICATIONS  (STANDING):  cefTRIAXone   IVPB 1000 milliGRAM(s) IV Intermittent every 24 hours  heparin   Injectable 5000 Unit(s) SubCutaneous every 8 hours  labetalol 100 milliGRAM(s) Oral daily  metroNIDAZOLE  IVPB 500 milliGRAM(s) IV Intermittent every 8 hours  pantoprazole    Tablet 40 milliGRAM(s) Oral before breakfast  sodium chloride 0.9%. 1000 milliLiter(s) (60 mL/Hr) IV Continuous <Continuous>    MEDICATIONS  (PRN):  acetaminophen     Tablet .. 650 milliGRAM(s) Oral every 6 hours PRN Temp greater or equal to 38C (100.4F), Mild Pain (1 - 3)  melatonin 5 milliGRAM(s) Oral at bedtime PRN Insomnia      HOME MEDICATIONS:  Home Medications:      VITALS:   T(F): 98.4 (09-13 @ 07:30), Max: 98.4 (09-13 @ 07:30)  HR: 86 (09-13 @ 07:30) (86 - 100)  BP: 116/46 (09-13 @ 10:32) (116/46 - 147/97)  BP(mean): 116 (09-13 @ 07:30) (116 - 116)  RR: 18 (09-13 @ 07:30) (16 - 18)  SpO2: 96% (09-13 @ 07:30) (96% - 100%)    I&O's Summary    13 Sep 2022 07:01  -  13 Sep 2022 11:32  --------------------------------------------------------  IN: 0 mL / OUT: 150 mL / NET: -150 mL        REVIEW OF SYSTEMS:  Poor historian with flight of ideas  CONSTITUTIONAL: No weakness, fevers or chills  EYES: No visual changes  ENT: No vertigo or throat pain   NECK: No pain or stiffness  RESPIRATORY: No cough, wheezing, hemoptysis; No shortness of breath  CARDIOVASCULAR: Positive for chest pain  GASTROINTESTINAL: Positive for waterry diarrhea, NBNB vomiting and abdominal pain  GENITOURINARY: No dysuria, frequency or hematuria  NEUROLOGICAL: No numbness or weakness  SKIN: No itching, no rashes  MSK: No pain    PHYSICAL EXAM:  NEURO: patient is awake , alert and oriented with tangential answers  GEN: Not in acute distress  NECK: no thyroid enlargement, no JVD  LUNGS: Clear to auscultation bilaterally   CARDIOVASCULAR: S1/S2 present, RRR , no murmurs or rubs, no carotid bruits,  + PP bilaterally  ABD: Soft, non-tender, non-distended, +BS  EXT: No FABI  SKIN: Intact    LABS:                        12.8   7.15  )-----------( 167      ( 13 Sep 2022 07:34 )             38.7     09-13    139  |  104  |  14  ----------------------------<  118<H>  4.1   |  24  |  0.9    Ca    8.5      13 Sep 2022 07:34  Mg     1.9     09-13    TPro  6.3  /  Alb  3.6  /  TBili  0.7  /  DBili  x   /  AST  17  /  ALT  10  /  AlkPhos  127<H>  09-13      Troponin T, Serum: <0.01 ng/mL (09-13-22 @ 07:34)  Sedimentation Rate, Erythrocyte: 5 mm/Hr (09-13-22 @ 07:34)  Lactate, Blood: 1.3 mmol/L (09-13-22 @ 07:34)  Lactate, Blood: 1.7 mmol/L (09-12-22 @ 14:58)    CARDIAC MARKERS ( 13 Sep 2022 07:34 )  x     / <0.01 ng/mL / x     / x     / x            Troponin trend:    Serum Pro-Brain Natriuretic Peptide: 975 pg/mL (09-13-22 @ 07:34)    09-13 Chol 195 LDL -- HDL 59 Trig 64      RADIOLOGY:  -CXR:  < from: Xray Chest 1 View- PORTABLE-Urgent (Xray Chest 1 View- PORTABLE-Urgent .) (09.13.22 @ 01:42) >    Enlarged left upper mediastinal silhouette, may represent dilated   thoracic aorta.  Pericardial effusion better seen on CT.  Bilateral pleural effusions.    --- End of Report ---    < end of copied text >    < from: CT Angio Chest Aorta w/wo IV Cont (09.13.22 @ 10:07) >    IMPRESSION:    1. Severe aneurysmal dilatation of the descending thoracic aorta,   measuring 6.3 x 5.9 cm at its mid-portion, with evidence of intramural   hematoma, possibly subacute, versus chronic dissection with a thrombosed   false lumen. Aortic arch measures 5.6 cm. Descending thoracic aorta   measures 4.5 cm at the level of the diaphragmatic hiatus.    2. Since September 12, 2022, moderate size left and small right pleural   effusion; unchanged moderate pericardial effusion.    3. No significant change in hypoenhancement involving the left renal   interpolar region and left upper pole, possibly reflecting developing   infarct versus acute pyelonephritis.    4. Remainder of findings are overall unchanged on this short-term   follow-up exam.      A callback was placed at the time of dictation.    --- End of Report ---    < end of copied text >      -TTE:  pending     ECG:         HPI:  86F with no known PMHx presents to the ED for evaluation of abd pain, watery diarrhea, and NBNB vomiting. The pt is a poor historian, no family at bedside; lives alone at home and her  passed away. She presents today with persistent watery diarrhea and NBNB vomiting of unclear duration. She denies any recent sick contacts, food from outside, recent travel, or recent infections. She is forgetful and doesn't know exactly why she's there at the time of my exam. She denies any CP, SOB, palpitations, headaches, blurry vision, abd pain, constipation, dysuria, incontinence, or any  symptoms.  In the ED: /85 bilaterally, , T 97.6F, RR 16 satting 100% on RA. Labs notable for WBC 13k (91% N), Cr 1.1 BUN 19 (no baseline). Elevated but hemolyzed ALP and AST. UA +ve for large LE and pyuria.   CT AP shows   < from: CT Abdomen and Pelvis w/ IV Cont (09.12.22 @ 15:31) >  Colitis involving the descending and sigmoid colon.    Patchy hypoenhancement of the left kidney. Differential includes acute   pyelonephritis versus renal infarcts. Clinical correlation is needed.  Partially imaged descending thoracic aorta is aneurysmal measuring up to   5.7 cm. Abdominal aorta is also aneurysmal measuring up to 4.3 cm.   Extensive mural plaque is noted throughout the aorta. Recommend chest CT   follow-up to fully assess the thoracic aorta.  Partially imaged moderate pericardial effusion.  Partially imaged bilateral pleural effusions with compressive atelectasis.  Hypoattenuating rounded 1.4 cm uterine lesion, indeterminate. Bilateral   adnexal cysts measuring up to 3.2 cm on the right. Recommend evaluation   with pelvic sonography.  Indeterminate 1.5 cm hypoattenuating splenic lesion. This can be followed   up with outpatient contrast-enhanced MRI.  < end of copied text >  She was also found to have Covid-19. She was given 1L LR, cipro, and flagyl, and admitted to medicine. (13 Sep 2022 00:45)    Cardiology is consulted for preoperative risk assessment before vascular intervention. patient seen and examined   patient is confused with flight of ideas, unable to obtain accurate history.       PAST MEDICAL & SURGICAL HISTORY  Denies     SOCIAL HISTORY: Denies EtOH, smoking or drug use    ALLERGIES:  Allergy Status Unknown      MEDICATIONS:  MEDICATIONS  (STANDING):  cefTRIAXone   IVPB 1000 milliGRAM(s) IV Intermittent every 24 hours  heparin   Injectable 5000 Unit(s) SubCutaneous every 8 hours  labetalol 100 milliGRAM(s) Oral daily  metroNIDAZOLE  IVPB 500 milliGRAM(s) IV Intermittent every 8 hours  pantoprazole    Tablet 40 milliGRAM(s) Oral before breakfast  sodium chloride 0.9%. 1000 milliLiter(s) (60 mL/Hr) IV Continuous <Continuous>    MEDICATIONS  (PRN):  acetaminophen     Tablet .. 650 milliGRAM(s) Oral every 6 hours PRN Temp greater or equal to 38C (100.4F), Mild Pain (1 - 3)  melatonin 5 milliGRAM(s) Oral at bedtime PRN Insomnia        VITALS:   T(F): 98.4 (09-13 @ 07:30), Max: 98.4 (09-13 @ 07:30)  HR: 86 (09-13 @ 07:30) (86 - 100)  BP: 116/46 (09-13 @ 10:32) (116/46 - 147/97)  BP(mean): 116 (09-13 @ 07:30) (116 - 116)  RR: 18 (09-13 @ 07:30) (16 - 18)  SpO2: 96% (09-13 @ 07:30) (96% - 100%)    I&O's Summary    13 Sep 2022 07:01  -  13 Sep 2022 11:32  --------------------------------------------------------  IN: 0 mL / OUT: 150 mL / NET: -150 mL        REVIEW OF SYSTEMS:  Poor historian with flight of ideas  CONSTITUTIONAL: No weakness, fevers or chills  EYES: No visual changes  ENT: No vertigo or throat pain   NECK: No pain or stiffness  RESPIRATORY: No cough, wheezing, hemoptysis; No shortness of breath  CARDIOVASCULAR: Positive for chest pain  GASTROINTESTINAL: Positive for waterry diarrhea, NBNB vomiting and abdominal pain  GENITOURINARY: No dysuria, frequency or hematuria  NEUROLOGICAL: No numbness or weakness  SKIN: No itching, no rashes  MSK: No pain    PHYSICAL EXAM:  NEURO: patient is awake, alert and oriented with tangential answers  GEN: Not in acute distress  NECK: no JVD  LUNGS: Clear to auscultation bilaterally   CARDIOVASCULAR: S1/S2 present, RRR , no murmurs  ABD: Soft, non-tender, non-distended  EXT: No FABI  SKIN: Intact      LABS:                        12.8   7.15  )-----------( 167      ( 13 Sep 2022 07:34 )             38.7     09-13    139  |  104  |  14  ----------------------------<  118<H>  4.1   |  24  |  0.9    Ca    8.5      13 Sep 2022 07:34  Mg     1.9     09-13    TPro  6.3  /  Alb  3.6  /  TBili  0.7  /  DBili  x   /  AST  17  /  ALT  10  /  AlkPhos  127<H>  09-13      Troponin T, Serum: <0.01 ng/mL (09-13-22 @ 07:34)  Sedimentation Rate, Erythrocyte: 5 mm/Hr (09-13-22 @ 07:34)  Lactate, Blood: 1.3 mmol/L (09-13-22 @ 07:34)  Lactate, Blood: 1.7 mmol/L (09-12-22 @ 14:58)    Serum Pro-Brain Natriuretic Peptide: 975 pg/mL (09-13-22 @ 07:34)    09-13 Chol 195 LDL -- HDL 59 Trig 64      RADIOLOGY:    < from: Xray Chest 1 View- PORTABLE-Urgent (Xray Chest 1 View- PORTABLE-Urgent .) (09.13.22 @ 01:42) >    Enlarged left upper mediastinal silhouette, may represent dilated   thoracic aorta.  Pericardial effusion better seen on CT.  Bilateral pleural effusions.    --- End of Report ---    < end of copied text >        < from: CT Angio Chest Aorta w/wo IV Cont (09.13.22 @ 10:07) >    IMPRESSION:    1. Severe aneurysmal dilatation of the descending thoracic aorta,   measuring 6.3 x 5.9 cm at its mid-portion, with evidence of intramural   hematoma, possibly subacute, versus chronic dissection with a thrombosed   false lumen. Aortic arch measures 5.6 cm. Descending thoracic aorta   measures 4.5 cm at the level of the diaphragmatic hiatus.    2. Since September 12, 2022, moderate size left and small right pleural   effusion; unchanged moderate pericardial effusion.    3. No significant change in hypoenhancement involving the left renal   interpolar region and left upper pole, possibly reflecting developing   infarct versus acute pyelonephritis.    4. Remainder of findings are overall unchanged on this short-term   follow-up exam.      A callback was placed at the time of dictation.    --- End of Report ---    < end of copied text >

## 2022-09-13 NOTE — CONSULT NOTE ADULT - SUBJECTIVE AND OBJECTIVE BOX
Patient is a 86y old  Female who presents with a chief complaint of Colitis, aortic aneurysm (13 Sep 2022 11:40)      HPI:  86F with no known PMHx presents to the ED for evaluation of abd pain, watery diarrhea, and NBNB vomiting. The pt is a poor historian, no family at bedside; lives alone at home and her  passed away. She presents today with persistent watery diarrhea and NBNB vomiting of unclear duration. She denies any recent sick contacts, food from outside, recent travel, or recent infections. She is forgetful and doesn't know exactly why she's there at the time of my exam. She denies any CP, SOB, palpitations, headaches, blurry vision, abd pain, constipation, dysuria, incontinence, or any  symptoms.    In the ED: /85 bilaterally, , T 97.6F, RR 16 satting 100% on RA. Labs notable for WBC 13k (91% N), Cr 1.1 BUN 19 (no baseline). Elevated but hemolyzed ALP and AST. UA +ve for large LE and pyuria.     CT AP shows   < from: CT Abdomen and Pelvis w/ IV Cont (22 @ 15:31) >  Colitis involving the descending and sigmoid colon.    Patchy hypoenhancement of the left kidney. Differential includes acute   pyelonephritis versus renal infarcts. Clinical correlation is needed.    Partially imaged descending thoracic aorta is aneurysmal measuring up to   5.7 cm. Abdominal aorta is also aneurysmal measuring up to 4.3 cm.   Extensive mural plaque is noted throughout the aorta. Recommend chest CT   follow-up to fully assess the thoracic aorta.    Partially imaged moderate pericardial effusion.    Partially imaged bilateral pleural effusions with compressive atelectasis.    Hypoattenuating rounded 1.4 cm uterine lesion, indeterminate. Bilateral   adnexal cysts measuring up to 3.2 cm on the right. Recommend evaluation   with pelvic sonography.    Indeterminate 1.5 cm hypoattenuating splenic lesion. This can be followed   up with outpatient contrast-enhanced MRI.    < end of copied text >    She was also found to have Covid-19. She was given 1L LR, cipro, and flagyl, and admitted to medicine. (13 Sep 2022 00:45)      PAST MEDICAL & SURGICAL HISTORY:      Social: unable to obtain    FAMILY HISTORY:  :  No known cardiovacular family hisotry     Review Of Systems:     All ROS are negative except per HPI       Allergies    Allergy Status Unknown    Intolerances          PHYSICAL EXAM    ICU Vital Signs Last 24 Hrs  T(C): 36.9 (13 Sep 2022 12:30), Max: 36.9 (13 Sep 2022 07:30)  T(F): 98.4 (13 Sep 2022 12:30), Max: 98.4 (13 Sep 2022 07:30)  HR: 68 (13 Sep 2022 12:30) (68 - 96)  BP: 122/74 (13 Sep 2022 12:30) (116/46 - 147/97)  BP(mean): 93 (13 Sep 2022 12:30) (93 - 116)  RR: 18 (13 Sep 2022 12:) (16 - 18)  SpO2: 97% (13 Sep 2022 12:) (96% - 97%)    O2 Parameters below as of 13 Sep 2022 12:30  Patient On (Oxygen Delivery Method): room air            CONSTITUTIONAL:  Well nourished.  NAD    ENT:   Airway patent,   Mouth with normal mucosa.   No thrush    EYES:   pupils equal,   round and reactive to light.    CARDIAC:   Normal rate,   Regular rhythm.    Heart sounds S1, S2.   No edema    RESPIRATORY:   No wheezing   Normal chest expansion  No use of accessory muscles    GASTROINTESTINAL:  Abdomen soft   Non-tender,   No guarding,   + BS    MUSCULOSKELETAL:   Range of motion is not limited,  No clubbing, cyanosis    NEUROLOGICAL:   Alert  No motor or sensory deficits.  Pertinent DTRs normal    SKIN:   Skin normal color for race,   Warm and dry  No evidence of rash.    PSYCHIATRIC:   Normal mood and affect.   No apparent risk to self or others.              22 @ 07:01  -  22 @ 14:04  --------------------------------------------------------  IN:  Total IN: 0 mL    OUT:    Voided (mL): 150 mL  Total OUT: 150 mL    Total NET: -150 mL          LABS:                          12.8   7.15  )-----------( 167      ( 13 Sep 2022 07:34 )             38.7                                                   139  |  104  |  14  ----------------------------<  118<H>  4.1   |  24  |  0.9    Ca    8.5      13 Sep 2022 07:34  Mg     1.9         TPro  6.3  /  Alb  3.6  /  TBili  0.7  /  DBili  x   /  AST  17  /  ALT  10  /  AlkPhos  127<H>                                               Urinalysis Basic - ( 12 Sep 2022 14:49 )    Color: Yellow / Appearance: Slightly Turbid / S.018 / pH: x  Gluc: x / Ketone: Trace  / Bili: Negative / Urobili: 3 mg/dL   Blood: x / Protein: 100 mg/dL / Nitrite: Negative   Leuk Esterase: Large / RBC: 27 /HPF / WBC 43 /HPF   Sq Epi: x / Non Sq Epi: 3 /HPF / Bacteria: Negative        CARDIAC MARKERS ( 13 Sep 2022 07:34 )  x     / <0.01 ng/mL / x     / x     / x                                                LIVER FUNCTIONS - ( 13 Sep 2022 07:34 )  Alb: 3.6 g/dL / Pro: 6.3 g/dL / ALK PHOS: 127 U/L / ALT: 10 U/L / AST: 17 U/L / GGT: x                                                                                                                                       X-Rays reviewed:                                                                                    ECHO    CXR interpreted by me:    MEDICATIONS  (STANDING):  cefTRIAXone   IVPB 1000 milliGRAM(s) IV Intermittent every 24 hours  enoxaparin Injectable 40 milliGRAM(s) SubCutaneous every 24 hours  labetalol 100 milliGRAM(s) Oral daily  metroNIDAZOLE  IVPB 500 milliGRAM(s) IV Intermittent every 8 hours  pantoprazole    Tablet 40 milliGRAM(s) Oral before breakfast  sodium chloride 0.9%. 1000 milliLiter(s) (60 mL/Hr) IV Continuous <Continuous>    MEDICATIONS  (PRN):  acetaminophen     Tablet .. 650 milliGRAM(s) Oral every 6 hours PRN Temp greater or equal to 38C (100.4F), Mild Pain (1 - 3)  melatonin 5 milliGRAM(s) Oral at bedtime PRN Insomnia

## 2022-09-13 NOTE — H&P ADULT - ASSESSMENT
86F with no known PMHx presents to the ED for evaluation of abd pain, watery diarrhea, and NBNB vomiting.    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  #Abdominal aortic aneurysm 4.3cm  #Extensive aortic mural plaque  - Vascular on board  - Discussed with vascular, no indication for AC for now, medical management, attending to f/u in AM    #Moderate pericardial effusion  #Moderate bilateral pleural effusions  - No SOB, cough, LE edema, orthopnea/PND  - Holosystolic murmur at Rt sternal border, strong S1 at apex  - No pre-syncope or CP as per pt  - F/u TTE, trop, BNP  - F/u A1c, lipids  - Avoid overload    #Covid-19  - Unknown if vaccinated or not; pt poor historian, no family available   - Not on O2, asymptomatic, no recent sick contacts  - F/u inflammatory markers, f/u CXR  - Would f/u AM labs to monitor Cr and LFTs then start RDV if appropriate     #Hypoattenuating rounded 1.4 cm uterine lesion  #Bilateral adnexal cysts measuring up to 3.2 cm on the right  - F/u pelvic US    DVT ppx: subq hep for now  GI ppx: protonix  Diet: Clears, low residue, AAT  Activity: IAT  Dispo: Acute   86F with no known PMHx presents to the ED for evaluation of abd pain, watery diarrhea, and NBNB vomiting.    *Pt says she is not on any meds, No family to contact to ask, nothing in sure scripts; if able please confirm that in the morning*     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  #Abdominal aortic aneurysm 4.3cm  #Extensive aortic mural plaque  - Vascular on board  - Discussed with vascular, no indication for AC for now, medical management, attending to f/u in AM    #Moderate pericardial effusion  #Moderate bilateral pleural effusions  - No SOB, cough, LE edema, orthopnea/PND  - Holosystolic murmur at Rt sternal border, strong S1 at apex  - No pre-syncope or CP as per pt  - F/u TTE, trop, BNP  - F/u A1c, lipids  - Avoid overload    #Covid-19  - Unknown if vaccinated or not; pt poor historian, no family available   - Not on O2, asymptomatic, no recent sick contacts  - F/u inflammatory markers, f/u CXR  - Would f/u AM labs to monitor Cr and LFTs then start RDV if appropriate     #Hypoattenuating rounded 1.4 cm uterine lesion  #Bilateral adnexal cysts measuring up to 3.2 cm on the right  - F/u pelvic US    DVT ppx: subq hep for now  GI ppx: protonix  Diet: Clears, low residue, AAT  Activity: IAT  Dispo: Acute

## 2022-09-13 NOTE — H&P ADULT - NSHPLABSRESULTS_GEN_ALL_CORE
< from: CT Abdomen and Pelvis w/ IV Cont (09.12.22 @ 15:31) >      IMPRESSION:    Colitis involving the descending and sigmoid colon.    Patchy hypoenhancement of the left kidney. Differential includes acute   pyelonephritis versus renal infarcts. Clinical correlation is needed.    Partially imaged descending thoracic aorta is aneurysmal measuring up to   5.7 cm. Abdominal aorta is also aneurysmal measuring up to 4.3 cm.   Extensive mural plaque is noted throughout the aorta. Recommend chest CT   follow-up to fully assess the thoracic aorta.    Partially imaged moderate pericardial effusion.    Partially imaged bilateral pleural effusions with compressive atelectasis.    Hypoattenuating rounded 1.4 cm uterine lesion, indeterminate. Bilateral   adnexal cysts measuring up to 3.2 cm on the right. Recommend evaluation   with pelvic sonography.    Indeterminate 1.5 cm hypoattenuating splenic lesion. This can be followed   up with outpatient contrast-enhanced MRI.    --- End of Report ---          < end of copied text >

## 2022-09-13 NOTE — CONSULT NOTE ADULT - ASSESSMENT
86F with no known PMHx presents to the ED for evaluation of abd pain, watery diarrhea, and NBNB vomiting.  patient is poor historian.   In the ED: /85 bilaterally, , T 97.6F, RR 16 satting 100% on RA. Labs notable for WBC 13k (91% N), Cr 1.1 BUN 19 (no baseline). Elevated but hemolyzed ALP and AST. UA +ve for large LE and pyuria.   COVID positive  CT chest notable for Severe aneurysmal dilatation of the descending thoracic aorta,   measuring 6.3 x 5.9 cm at its mid-portion, with evidence of intramural   hematoma, possibly subacute, versus chronic dissection with a thrombosed   false lumen and moderate pericardial effusion    Impression   -Acute Colitis   -Descending thoracic  and abdominal aorta aneurysm with intramural hemoatoma and thrombosed false lumen  -Pericardial and pleural effusion   -COVID 19 infection       Recommendations   -Telemetry monitor   -stat EKG  -state 2d echo COVID protocol   -increase labetalol to 100 BID  -consider start aspirin 81 daily   -Unable to Assess for METS patient is confused   -RCRI of 0  -patient is High risk for high risk procedure given age and pericardial effusion   -Vascular surgery follow up    86F with no known PMHx presents to the ED for evaluation of abd pain, watery diarrhea, and NBNB vomiting.  patient is poor historian.   In the ED: /85 bilaterally, , T 97.6F, RR 16 satting 100% on RA. Labs notable for WBC 13k (91% N), Cr 1.1 BUN 19 (no baseline). Elevated but hemolyzed ALP and AST. UA +ve for large LE and pyuria.   COVID positive  CT chest notable for Severe aneurysmal dilatation of the descending thoracic aorta,   measuring 6.3 x 5.9 cm at its mid-portion, with evidence of intramural   hematoma, possibly subacute, versus chronic dissection with a thrombosed   false lumen and moderate pericardial effusion    Impression   -Acute Colitis   -Descending thoracic  and abdominal aorta aneurysm with intramural hemoatoma and thrombosed false lumen  Moderate Pericardial and pleural effusion   -COVID 19 infection       Recommendations   -SICU/ ICU monitoring for 24-48 hrs   -cont labetalol to 100 daily   -consider start aspirin 81 daily and Lipitor   -Unable to Assess for METS   -patient is High risk for high risk procedure given age and pericardial effusion   -Vascular surgery follow up    86F with no known PMHx presents to the ED for evaluation of abd pain, watery diarrhea, and NBNB vomiting.  Patient is poor historian.   In the ED: /85 bilaterally, , T 97.6F, RR 16 satting 100% on RA. Labs notable for WBC 13k (91% N), Cr 1.1 BUN 19 (no baseline). Elevated but hemolyzed ALP and AST. UA +ve for large LE and pyuria.   COVID positive  CT chest notable for severe aneurysmal dilatation of the descending thoracic aorta,   measuring 6.3 x 5.9 cm at its mid-portion, with evidence of intramural   hematoma, possibly subacute, versus chronic dissection with a thrombosed   false lumen and moderate pericardial effusion    Impression   -Descending thoracic and abdominal aorta aneurysm with intramural hematoma and thrombosed false lumen  -Moderate pericardial effusion  -Left pleural effusion   -COVID positive

## 2022-09-13 NOTE — CONSULT NOTE ADULT - SUBJECTIVE AND OBJECTIVE BOX
Surgeon: Dr. Soto/ [facility]    Consult requesting by: Kaitlynn    HISTORY OF PRESENT ILLNESS:  86y Female c/o left posterior lower back pain below scapula. Appears from chart she has given different c/o upon various examinations such as diarrhea, abdominal pain with nausea and vomiting. Right now resting less pain in back. Hungry and wants to eat. Denies nausea, vomiting and diarrhea. Relates to no medical hx, lives alone. States she has had back pain in past and appears to have previous knowledge of aortic aneurysm. ? accuracy  of PMH.  Incidentally found to be CoVid +  CT Abdomen and Pelvis w/ IV Cont (22 @ 15:31) >  Colitis involving the descending and sigmoid colon.    Patchy hypoenhancement of the left kidney. Differential includes acute   pyelonephritis versus renal infarcts. Clinical correlation is needed.    Partially imaged descending thoracic aorta is aneurysmal measuring up to   5.7 cm. Abdominal aorta is also aneurysmal measuring up to 4.3 cm.   Extensive mural plaque is noted throughout the aorta.  Partially imaged moderate pericardial effusion.  Partially imaged bilateral pleural effusions with compressive atelectasis.    Hypoattenuating rounded 1.4 cm uterine lesion, indeterminate. Bilateral   adnexal cysts measuring up to 3.2 cm on the right. Recommend evaluation   with pelvic sonography.    Indeterminate 1.5 cm hypoattenuating splenic lesion. This can be followed   up with outpatient contrast-enhanced MRI.    CTS consulted for evaluation of thoracic aneurysm    Home Medications:  patient not sure    PAST MEDICAL & SURGICAL HISTORY:  denies    MEDICATIONS  (STANDING):  cefTRIAXone   IVPB 1000 milliGRAM(s) IV Intermittent every 24 hours  heparin   Injectable 5000 Unit(s) SubCutaneous every 8 hours  labetalol 100 milliGRAM(s) Oral daily  metroNIDAZOLE  IVPB 500 milliGRAM(s) IV Intermittent every 8 hours  pantoprazole    Tablet 40 milliGRAM(s) Oral before breakfast  sodium chloride 0.9%. 1000 milliLiter(s) (60 mL/Hr) IV Continuous <Continuous>    MEDICATIONS  (PRN):  acetaminophen     Tablet .. 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   Lives with: alone  Assisted device use: denies    FAMILY HISTORY:      Review of Systems  CONSTITUTIONAL: no fever, chills or night sweats]   NEURO:  denies seizures, paralysis or paresthesias                                                                                EYES: wears glasses, no double vision, no blurry vision                                                                          ENMT: no difficulty hearing, vertigo, dysphagia, epistaxis recent dental work [ ]                                      CV:  denies chest pain, WOODARD or palpatations at current time                                                                                            RESPIRATORY:  no cough or hemoptysis                                                                 GI:  no nausea, vomiting, constipation or diarrhea   : denies dysuria, hematuria, incontinence or retention                                                                                           MUSKULOSKELETAL:  denies joint swelling or muscle weakness  PSYCH:  denies dementia, depresion, anxiety   ENDOCRINE:  cold intolerance[ ] heat intolerance[ ] polydipsia[ ]                                                                                                                                                                                                PHYSICAL EXAM  Vital Signs Last 24 Hrs  T(C): 36.9 (13 Sep 2022 07:30), Max: 36.9 (13 Sep 2022 07:30)  T(F): 98.4 (13 Sep 2022 07:30), Max: 98.4 (13 Sep 2022 07:30)  HR: 86 (13 Sep 2022 07:30) (86 - 100)  BP: 116/46 (13 Sep 2022 10:32) (116/46 - 147/97)  BP(mean): 116 (13 Sep 2022 07:30) (116 - 116)  RR: 18 (13 Sep 2022 07:30) (16 - 18)  SpO2: 96% (13 Sep 2022 07:30) (96% - 100%)    Parameters below as of 13 Sep 2022 07:30  Patient On (Oxygen Delivery Method): room air    CONSTITUTIONAL:    thine 85 YO F  in NAD                                                                       Neuro: oriented to person/place & ? time with no focal motor or sensory  deficits     Eyes: PERRLA, EOMI, no nystagmus, sclera anicteric  ENT:  nasal/oral mucosa with absence of cyanosis, poor dentition  Neck: no jugular vein distention, trachea midline, no goiter   Chest: bilatertal breath sounds with good air movement decreased bs base on left side > right                                                                            CV:  RSR, S1S2, + murmur appreciated  GI:  soft, non-tender non-distended, + bowel sounds, + pulsation, + left CVA tenderness                                                                                                         Extremities:  no evidence of cyanosis or deformity, no pedal edema   Extremity Pulses: right / left:  femorals 2+/ 2+; DP's not felt; radials 2+/2+      SKIN : no rashes, multiple senile keratotic lesions                                                          LABS:                        12.8   7.15  )-----------( 167      ( 13 Sep 2022 07:34 )             38.7         139  |  104  |  14  ----------------------------<  118<H>  4.1   |  24  |  0.9    Ca    8.5      13 Sep 2022 07:34  Mg     1.9         TPro  6.3  /  Alb  3.6  /  TBili  0.7  /  DBili  x   /  AST  17  /  ALT  10  /  AlkPhos  127<H>        Urinalysis Basic - ( 12 Sep 2022 14:49 )    Color: Yellow / Appearance: Slightly Turbid / S.018 / pH: x  Gluc: x / Ketone: Trace  / Bili: Negative / Urobili: 3 mg/dL   Blood: x / Protein: 100 mg/dL / Nitrite: Negative   Leuk Esterase: Large / RBC: 27 /HPF / WBC 43 /HPF   Sq Epi: x / Non Sq Epi: 3 /HPF / Bacteria: Negative      CARDIAC MARKERS ( 13 Sep 2022 07:34 )  x     / <0.01 ng/mL / x     / x     / x        COVID-19: Detected (22 @ 19:14)    < from: Xray Chest 1 View- PORTABLE-Urgent (Xray Chest 1 View- PORTABLE-Urgent .) (22 @ 01:42) >  Impression:    Enlarged left upper mediastinal silhouette, may represent dilated   thoracic aorta.  Pericardial effusion better seen on CT.  Bilateral pleural effusions.    < end of copied text >  < from: US Pelvis Complete (US Pelvis Complete .) (22 @ 10:32) >  Uterus  : 3.2 x 1.5 x 2 cm. Within normal limits.  Endometrium: 2 mm. Within normal limits.    Right ovary: , Gonadal tissue not well identified. 3 cm right adnexal   cyst. Left ovary: Gonadal tissue not well identified. 2.9 cm left adnexal   cyst. Fluid: None.    IMPRESSION:  3 cm simple cysts bilaterally.      Assessment/ Plan:  85 YO F in NAD initially presented with diarrhea and found to have thoracic aneurysm with moderate pericardial  and pleural effusion L>R. Poor Medial historian but does state she was aware of her aneurysm.     CTS: recommendation  Maintain day sbp < 120  CTA of Chest to evaluate ascending aorta  Vascular surgery for thoracic aneurysm  Patient Seen by Dr. Soto - attending note to follow

## 2022-09-13 NOTE — H&P ADULT - NSHPPHYSICALEXAM_GEN_ALL_CORE
General: NAD, AOx1, cachectic  HEENT: Normocephalic, atraumatic  Lungs: Normal breath sounds, slightly decreased on Lt, no crackles/wheezes/rhonchi  Heart: S1s2. Holosystolic murmur at Rt sternal border, Strong S1 at apex  Abdomen: Soft, NT/ND. No rigidity/guarding  Extremities: Peripheral pulses +1, no cyanosis. No edema  Neuro: Grossly normal motor exam, no focal deficits  Psych: AOx1, forgetful  Skin: no rashes or bruises

## 2022-09-13 NOTE — H&P ADULT - ATTENDING COMMENTS
HPI:  86F with no known PMHx presents to the ED for evaluation of abd pain, watery diarrhea, and NBNB vomiting. The pt is a poor historian, no family at bedside; lives alone at home and her  passed away. She presents today with persistent watery diarrhea and NBNB vomiting of unclear duration. She denies any recent sick contacts, food from outside, recent travel, or recent infections. She is forgetful and doesn't know exactly why she's there at the time of my exam. She denies any CP, SOB, palpitations, headaches, blurry vision, abd pain, constipation, dysuria, incontinence, or any  symptoms.    In the ED: /85 bilaterally, , T 97.6F, RR 16 satting 100% on RA. Labs notable for WBC 13k (91% N), Cr 1.1 BUN 19 (no baseline). Elevated but hemolyzed ALP and AST. UA +ve for large LE and pyuria.     CT AP shows   < from: CT Abdomen and Pelvis w/ IV Cont (09.12.22 @ 15:31) >  Colitis involving the descending and sigmoid colon.    Patchy hypoenhancement of the left kidney. Differential includes acute   pyelonephritis versus renal infarcts. Clinical correlation is needed.    Partially imaged descending thoracic aorta is aneurysmal measuring up to   5.7 cm. Abdominal aorta is also aneurysmal measuring up to 4.3 cm.   Extensive mural plaque is noted throughout the aorta. Recommend chest CT   follow-up to fully assess the thoracic aorta.    Partially imaged moderate pericardial effusion.    Partially imaged bilateral pleural effusions with compressive atelectasis.    Hypoattenuating rounded 1.4 cm uterine lesion, indeterminate. Bilateral   adnexal cysts measuring up to 3.2 cm on the right. Recommend evaluation   with pelvic sonography.    Indeterminate 1.5 cm hypoattenuating splenic lesion. This can be followed   up with outpatient contrast-enhanced MRI.    < end of copied text >    She was also found to have Covid-19. She was given 1L LR, cipro, and flagyl, and admitted to medicine. (13 Sep 2022 00:45)    REVIEW OF SYSTEMS:  CONSTITUTIONAL: No weakness, fevers or chills  EYES/ENT: No visual changes;  No vertigo or throat pain   NECK: No pain or stiffness  RESPIRATORY: No cough, wheezing, hemoptysis; No shortness of breath  CARDIOVASCULAR: No chest pain or palpitations, (+) ELSIE   GASTROINTESTINAL: No abdominal or epigastric pain. No nausea, vomiting, or hematemesis; No diarrhea or constipation. No melena or hematochezia.  GENITOURINARY: No dysuria, frequency or hematuria  NEUROLOGICAL: No numbness No focal weakness, (+) dementia/confusion   SKIN: No itching, rashes    Physical Exam:   General: WN/WD NAD  Neurology: A&Ox3, nonfocal, follows commands  Eyes: PERRLA/ EOMI  ENT/Neck: Neck supple, trachea midline, No JVD  Respiratory: CTA B/L, No wheezing, rales, rhonchi  CV: Normal rate regular rhythm, S1S2, (+) systolic murmurs-loudest at the R upper sternal margin, No rubs or gallops  Abdominal: Soft, NT, ND +BS,   Extremities: No edema, + peripheral pulses  Skin: No Rashes, Hematoma, Ecchymosis  Incisions:   Tubes:    A/p  Acute descending and sigmoid colitis   -IV abx   -check blood Cx and stool PCR /O &P   -IV hydration   -check blood Cx     Confusion  r/o delirium vs. chronic dementia ( unable to get baseline from family)  suspect delirium 2/2 colitis   -as above / IV abx   -monitor for improvement when infection is treated   - to assist w/ safe d/c plan    Moderate pericardial effusion   Moderate B/L pleural effusions - no resp distress   Systolic ejection murmur  -2D echo   -Cardiology eval and PRN CTS eval   -Pulm eval of pulm effusions     Thoracic aortic aneurysm   AAA   Aortic mural plaque  -Vascular eval     Incidental COVID-19 infection w/ resp effect   -agree w/ inflammatory markers     Incidental findings of adnexal cysts and 1.4 hypoattenuating lesion in uterus  -Pelvis U/S ( can be done outpatient if follow up at D/c assured- patient current confused)    DVT prophylaxis

## 2022-09-14 LAB
ALBUMIN SERPL ELPH-MCNC: 3.2 G/DL — LOW (ref 3.5–5.2)
ALP SERPL-CCNC: 112 U/L — SIGNIFICANT CHANGE UP (ref 30–115)
ALT FLD-CCNC: 8 U/L — SIGNIFICANT CHANGE UP (ref 0–41)
ANION GAP SERPL CALC-SCNC: 13 MMOL/L — SIGNIFICANT CHANGE UP (ref 7–14)
AST SERPL-CCNC: 14 U/L — SIGNIFICANT CHANGE UP (ref 0–41)
BILIRUB SERPL-MCNC: 0.3 MG/DL — SIGNIFICANT CHANGE UP (ref 0.2–1.2)
BUN SERPL-MCNC: 11 MG/DL — SIGNIFICANT CHANGE UP (ref 10–20)
CALCIUM SERPL-MCNC: 8.1 MG/DL — LOW (ref 8.4–10.4)
CHLORIDE SERPL-SCNC: 107 MMOL/L — SIGNIFICANT CHANGE UP (ref 98–110)
CO2 SERPL-SCNC: 20 MMOL/L — SIGNIFICANT CHANGE UP (ref 17–32)
CREAT SERPL-MCNC: 0.9 MG/DL — SIGNIFICANT CHANGE UP (ref 0.7–1.5)
EGFR: 63 ML/MIN/1.73M2 — SIGNIFICANT CHANGE UP
GLUCOSE SERPL-MCNC: 90 MG/DL — SIGNIFICANT CHANGE UP (ref 70–99)
HCT VFR BLD CALC: 36.7 % — LOW (ref 37–47)
HGB BLD-MCNC: 12 G/DL — SIGNIFICANT CHANGE UP (ref 12–16)
MCHC RBC-ENTMCNC: 29.2 PG — SIGNIFICANT CHANGE UP (ref 27–31)
MCHC RBC-ENTMCNC: 32.7 G/DL — SIGNIFICANT CHANGE UP (ref 32–37)
MCV RBC AUTO: 89.3 FL — SIGNIFICANT CHANGE UP (ref 81–99)
NRBC # BLD: 0 /100 WBCS — SIGNIFICANT CHANGE UP (ref 0–0)
PLATELET # BLD AUTO: 160 K/UL — SIGNIFICANT CHANGE UP (ref 130–400)
POTASSIUM SERPL-MCNC: 3.7 MMOL/L — SIGNIFICANT CHANGE UP (ref 3.5–5)
POTASSIUM SERPL-SCNC: 3.7 MMOL/L — SIGNIFICANT CHANGE UP (ref 3.5–5)
PROT SERPL-MCNC: 5.7 G/DL — LOW (ref 6–8)
RBC # BLD: 4.11 M/UL — LOW (ref 4.2–5.4)
RBC # FLD: 14.6 % — HIGH (ref 11.5–14.5)
SODIUM SERPL-SCNC: 140 MMOL/L — SIGNIFICANT CHANGE UP (ref 135–146)
WBC # BLD: 6.97 K/UL — SIGNIFICANT CHANGE UP (ref 4.8–10.8)
WBC # FLD AUTO: 6.97 K/UL — SIGNIFICANT CHANGE UP (ref 4.8–10.8)

## 2022-09-14 PROCEDURE — 99232 SBSQ HOSP IP/OBS MODERATE 35: CPT

## 2022-09-14 PROCEDURE — 71045 X-RAY EXAM CHEST 1 VIEW: CPT | Mod: 26

## 2022-09-14 PROCEDURE — 71275 CT ANGIOGRAPHY CHEST: CPT | Mod: 26

## 2022-09-14 PROCEDURE — 99233 SBSQ HOSP IP/OBS HIGH 50: CPT

## 2022-09-14 RX ORDER — LIDOCAINE 4 G/100G
1 CREAM TOPICAL DAILY
Refills: 0 | Status: DISCONTINUED | OUTPATIENT
Start: 2022-09-14 | End: 2022-09-16

## 2022-09-14 RX ORDER — ONDANSETRON 8 MG/1
4 TABLET, FILM COATED ORAL ONCE
Refills: 0 | Status: COMPLETED | OUTPATIENT
Start: 2022-09-14 | End: 2022-09-14

## 2022-09-14 RX ORDER — HYDROMORPHONE HYDROCHLORIDE 2 MG/ML
2 INJECTION INTRAMUSCULAR; INTRAVENOUS; SUBCUTANEOUS ONCE
Refills: 0 | Status: DISCONTINUED | OUTPATIENT
Start: 2022-09-14 | End: 2022-09-14

## 2022-09-14 RX ORDER — SODIUM CHLORIDE 9 MG/ML
500 INJECTION INTRAMUSCULAR; INTRAVENOUS; SUBCUTANEOUS ONCE
Refills: 0 | Status: COMPLETED | OUTPATIENT
Start: 2022-09-14 | End: 2022-09-14

## 2022-09-14 RX ADMIN — Medication 100 MILLIGRAM(S): at 13:05

## 2022-09-14 RX ADMIN — SODIUM CHLORIDE 60 MILLILITER(S): 9 INJECTION INTRAMUSCULAR; INTRAVENOUS; SUBCUTANEOUS at 01:35

## 2022-09-14 RX ADMIN — HYDROMORPHONE HYDROCHLORIDE 2 MILLIGRAM(S): 2 INJECTION INTRAMUSCULAR; INTRAVENOUS; SUBCUTANEOUS at 16:42

## 2022-09-14 RX ADMIN — Medication 5 MILLIGRAM(S): at 23:55

## 2022-09-14 RX ADMIN — HYDROMORPHONE HYDROCHLORIDE 2 MILLIGRAM(S): 2 INJECTION INTRAMUSCULAR; INTRAVENOUS; SUBCUTANEOUS at 16:12

## 2022-09-14 RX ADMIN — SODIUM CHLORIDE 1000 MILLILITER(S): 9 INJECTION INTRAMUSCULAR; INTRAVENOUS; SUBCUTANEOUS at 00:15

## 2022-09-14 RX ADMIN — ONDANSETRON 4 MILLIGRAM(S): 8 TABLET, FILM COATED ORAL at 23:33

## 2022-09-14 RX ADMIN — Medication 100 MILLIGRAM(S): at 06:48

## 2022-09-14 RX ADMIN — Medication 650 MILLIGRAM(S): at 08:59

## 2022-09-14 RX ADMIN — PANTOPRAZOLE SODIUM 40 MILLIGRAM(S): 20 TABLET, DELAYED RELEASE ORAL at 06:48

## 2022-09-14 RX ADMIN — ENOXAPARIN SODIUM 40 MILLIGRAM(S): 100 INJECTION SUBCUTANEOUS at 11:39

## 2022-09-14 RX ADMIN — CEFTRIAXONE 100 MILLIGRAM(S): 500 INJECTION, POWDER, FOR SOLUTION INTRAMUSCULAR; INTRAVENOUS at 11:38

## 2022-09-14 RX ADMIN — Medication 100 MILLIGRAM(S): at 21:55

## 2022-09-14 RX ADMIN — LIDOCAINE 1 PATCH: 4 CREAM TOPICAL at 23:43

## 2022-09-14 NOTE — CONSULT NOTE ADULT - CONSULT REASON
aortic aneurysm
descending thoracic aortic aneurysm
aortic dissection
Preoperative risk assessment
COVID-19

## 2022-09-14 NOTE — PROGRESS NOTE ADULT - ASSESSMENT
86F with no known PMHx presents to the ED for evaluation of abd pain, watery diarrhea, and NBNB vomiting.  Patient is poor historian.   In the ED: /85 bilaterally, , T 97.6F, RR 16 satting 100% on RA. Labs notable for WBC 13k (91% N), Cr 1.1 BUN 19 (no baseline). Elevated but hemolyzed ALP and AST. UA +ve for large LE and pyuria.   COVID positive  CT chest notable for severe aneurysmal dilatation of the descending thoracic aorta,   measuring 6.3 x 5.9 cm at its mid-portion, with evidence of intramural   hematoma, possibly subacute, versus chronic dissection with a thrombosed   false lumen and moderate pericardial effusion    Impression   -Descending thoracic and abdominal aorta aneurysm with intramural hematoma and thrombosed false lumen  -Moderate pericardial effusion  -Left pleural effusion   -COVID positive  ------------------------------------------------------------  EKG NSR  ECHO EF 55-60%, mild to moderate AS, moderate pericardia effusion with no evidence of tamponade    Recommendation   - High-risk for MACE  - Labetalol for BP control  - Start Atorvastatin  - Consider A-line for BP management  - Consider thoracentesis for left pleural    86F with no known PMHx presents to the ED for evaluation of abd pain, watery diarrhea, and NBNB vomiting.  Patient is poor historian.   In the ED: /85 bilaterally, , T 97.6F, RR 16 satting 100% on RA. Labs notable for WBC 13k (91% N), Cr 1.1 BUN 19 (no baseline). Elevated but hemolyzed ALP and AST. UA +ve for large LE and pyuria.   COVID positive  CT chest notable for severe aneurysmal dilatation of the descending thoracic aorta,   measuring 6.3 x 5.9 cm at its mid-portion, with evidence of intramural   hematoma, possibly subacute, versus chronic dissection with a thrombosed   false lumen and moderate pericardial effusion    Impression   -Descending thoracic and abdominal aorta aneurysm with intramural hematoma and thrombosed false lumen  -Moderate pericardial effusion  -Left pleural effusion   -COVID positive  ------------------------------------------------------------  EKG NSR  ECHO EF 55-60%, mild to moderate AS, moderate pericardial effusion with no evidence of tamponade    Recommendations  - Repeat CTA is unchanged  - High-risk for MACE  - Continue Labetalol  - Start Atorvastatin  - Consider thoracentesis for left pleural effusion

## 2022-09-14 NOTE — PATIENT PROFILE ADULT - FALL HARM RISK - HARM RISK INTERVENTIONS

## 2022-09-14 NOTE — CONSULT NOTE ADULT - ASSESSMENT
86F with no known PMHx presents to the ED for evaluation of abd pain, watery diarrhea, and NBNB vomiting. The pt is a poor historian, no family at bedside; lives alone at home and her  passed away. She presents today with persistent watery diarrhea and NBNB vomiting of unclear duration. She denies any recent sick contacts, food from outside, recent travel, or recent infections.  She denies any CP, SOB, palpitations, headaches, blurry vision, abd pain, constipation, dysuria, incontinence, or any  symptoms.    In the ED: /85 bilaterally, , T 97.6F, RR 16 satting 100% on RA.     9/12 CT AP IV Cont   Colitis involving the descending and sigmoid colon.  Patchy hypoenhancement of the left kidney. Differential includes acute pyelonephritis versus renal infarcts.   Partially imaged descending thoracic aorta is aneurysmal measuring up to 5.7 cm. Abdominal aorta is also aneurysmal measuring up to 4.3 cm.   Extensive mural plaque is noted throughout the aorta. Recommend chest CT   follow-up to fully assess the thoracic aorta.  Partially imaged moderate pericardial effusion.  Partially imaged bilateral pleural effusions with compressive atelectasis.  Hypoattenuating rounded 1.4 cm uterine lesion, indeterminate. Bilateral   adnexal cysts measuring up to 3.2 cm on the right. Recommend evaluation   with pelvic sonography.  Indeterminate 1.5 cm hypoattenuating splenic lesion. This can be followed     IMPRESSION;  COVID with a positive test and asymptomatic   Timeline of infection is unclear based on the clinical history   CXR left pleural effusion . No GGO  CT chest no GGO  doubt COVID-19 causing any pathology for now and as timeline uncertain will not recommen treatment  Ddimers 7717 > no PE> no COVID-19 related cytokine response    Clinically no evidence of left acute pyelonephritis ( despite having pyuria )    Colitis > CD NG. Seems to be resolving. GI manifestations of COVID-19 ? 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

## 2022-09-14 NOTE — PROGRESS NOTE ADULT - SUBJECTIVE AND OBJECTIVE BOX
INTERVAL HPI/OVERNIGHT EVENTS:    SUBJECTIVE: Patient seen and examined at bedside.     cc: abd pain  no cp, sob, abd pain, fever  no cp, palpitations rucker, orthopnea    OBJECTIVE:    VITAL SIGNS:  Vital Signs Last 24 Hrs  T(C): 35.7 (14 Sep 2022 08:00), Max: 35.7 (14 Sep 2022 08:00)  T(F): 96.2 (14 Sep 2022 08:00), Max: 96.2 (14 Sep 2022 08:00)  HR: 69 (14 Sep 2022 12:00) (64 - 113)  BP: 116/53 (14 Sep 2022 12:00) (73/51 - 125/77)  BP(mean): 69 (14 Sep 2022 00:30) (57 - 95)  RR: 18 (14 Sep 2022 12:00) (17 - 18)  SpO2: 99% (14 Sep 2022 12:00) (98% - 99%)    Parameters below as of 14 Sep 2022 12:00  Patient On (Oxygen Delivery Method): room air          PHYSICAL EXAM:    General: NAD  HEENT: NC/AT; PERRL, clear conjunctiva  Neck: supple  Respiratory: CTA b/l  Cardiovascular: +S1/S2; RRR  Abdomen: soft, NT/ND; +BS x4  Extremities: WWP, 2+ peripheral pulses b/l; no LE edema  Skin: normal color and turgor; no rash  Neurological:    MEDICATIONS:  MEDICATIONS  (STANDING):  cefTRIAXone   IVPB 1000 milliGRAM(s) IV Intermittent every 24 hours  enoxaparin Injectable 40 milliGRAM(s) SubCutaneous every 24 hours  labetalol 100 milliGRAM(s) Oral daily  metroNIDAZOLE  IVPB 500 milliGRAM(s) IV Intermittent every 8 hours  pantoprazole    Tablet 40 milliGRAM(s) Oral before breakfast    MEDICATIONS  (PRN):  acetaminophen     Tablet .. 650 milliGRAM(s) Oral every 6 hours PRN Temp greater or equal to 38C (100.4F), Mild Pain (1 - 3)  melatonin 5 milliGRAM(s) Oral at bedtime PRN Insomnia      ALLERGIES:  Allergies    Allergy Status Unknown    Intolerances        LABS:                        12.8   7.15  )-----------( 167      ( 13 Sep 2022 07:34 )             38.7     Hemoglobin: 12.8 g/dL (09-13 @ 07:34)  Hemoglobin: 14.7 g/dL ( @ 14:58)    CBC Full  -  ( 13 Sep 2022 07:34 )  WBC Count : 7.15 K/uL  RBC Count : 4.37 M/uL  Hemoglobin : 12.8 g/dL  Hematocrit : 38.7 %  Platelet Count - Automated : 167 K/uL  Mean Cell Volume : 88.6 fL  Mean Cell Hemoglobin : 29.3 pg  Mean Cell Hemoglobin Concentration : 33.1 g/dL  Auto Neutrophil # : 5.71 K/uL  Auto Lymphocyte # : 0.60 K/uL  Auto Monocyte # : 0.66 K/uL  Auto Eosinophil # : 0.11 K/uL  Auto Basophil # : 0.04 K/uL  Auto Neutrophil % : 79.9 %  Auto Lymphocyte % : 8.4 %  Auto Monocyte % : 9.2 %  Auto Eosinophil % : 1.5 %  Auto Basophil % : 0.6 %        139  |  104  |  14  ----------------------------<  118<H>  4.1   |  24  |  0.9    Ca    8.5      13 Sep 2022 07:34  Mg     1.9         TPro  6.3  /  Alb  3.6  /  TBili  0.7  /  DBili  x   /  AST  17  /  ALT  10  /  AlkPhos  127<H>      Creatinine Trend: 0.9<--, 1.1<--  LIVER FUNCTIONS - ( 13 Sep 2022 07:34 )  Alb: 3.6 g/dL / Pro: 6.3 g/dL / ALK PHOS: 127 U/L / ALT: 10 U/L / AST: 17 U/L / GGT: x               hs Troponin:            Urinalysis Basic - ( 12 Sep 2022 14:49 )    Color: Yellow / Appearance: Slightly Turbid / S.018 / pH: x  Gluc: x / Ketone: Trace  / Bili: Negative / Urobili: 3 mg/dL   Blood: x / Protein: 100 mg/dL / Nitrite: Negative   Leuk Esterase: Large / RBC: 27 /HPF / WBC 43 /HPF   Sq Epi: x / Non Sq Epi: 3 /HPF / Bacteria: Negative      CSF:                      EKG:   MICROBIOLOGY:    IMAGING:      Labs, imaging, EKG personally reviewed    RADIOLOGY & ADDITIONAL TESTS: Reviewed.

## 2022-09-14 NOTE — PROGRESS NOTE ADULT - SUBJECTIVE AND OBJECTIVE BOX
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 and thrombosed false lumen  hypotensive overnight   patient c/o of left shoulder pain, otherwise no complaints    ======================  MEDICATIONS:  cefTRIAXone   IVPB 1000 milliGRAM(s) IV Intermittent every 24 hours  enoxaparin Injectable 40 milliGRAM(s) SubCutaneous every 24 hours  labetalol 100 milliGRAM(s) Oral daily  metroNIDAZOLE  IVPB 500 milliGRAM(s) IV Intermittent every 8 hours  pantoprazole    Tablet 40 milliGRAM(s) Oral before breakfast    DRIPS:    PRN:         ======================  OBJECTIVE:  T(F): 96.2 ( @ 08:00), Max: 98.4 ( @ 12:30)  HR: 76 ( @ 08:03) (64 - 113)  BP: 106/60 ( @ 08:03) (73/51 - 125/77)  RR: 18 ( @ 08:03) (17 - 18)  SpO2: 99% ( @ 08:03) (97% - 99%)    I/O:       @ 07:01  -   @ 07:00  --------------------------------------------------------  IN: 0 mL / OUT: 150 mL / NET: -150 mL        Weight trend:  Weight (kg): 49.895 ()      PHYSICAL EXAMINATION:  NEURO: patient is awake , alert and oriented  GEN: Not in acute distress  NECK: no thyroid enlargement, no JVD  LUNGS: Clear to auscultation bilaterally   CARDIOVASCULAR: S1/S2 present, RRR , no murmurs or rubs, no carotid bruits,  + PP bilaterally  ABD: Soft, non-tender, non-distended, +BS  EXT: No FABI  SKIN: Intact    ======================    LABS:                          12.8   7.15  )-----------( 167      ( 13 Sep 2022 07:34 )             38.7         139  |  104  |  14  ----------------------------<  118<H>  4.1   |  24  |  0.9    Ca    8.5      13 Sep 2022 07:34  Mg     1.9         TPro  6.3  /  Alb  3.6  /  TBili  0.7  /  DBili  x   /  AST  17  /  ALT  10  /  AlkPhos  127<H>      LIVER FUNCTIONS - ( 13 Sep 2022 07:34 )  Alb: 3.6 g/dL / Pro: 6.3 g/dL / ALK PHOS: 127 U/L / ALT: 10 U/L / AST: 17 U/L / GGT: x               CARDIAC MARKERS ( 13 Sep 2022 07:34 )  x     / <0.01 ng/mL / x     / x     / x          Serum Pro-Brain Natriuretic Peptide: 975 pg/mL ()    Urinalysis Basic - ( 12 Sep 2022 14:49 )    Color: Yellow / Appearance: Slightly Turbid / S.018 / pH: x  Gluc: x / Ketone: Trace  / Bili: Negative / Urobili: 3 mg/dL   Blood: x / Protein: 100 mg/dL / Nitrite: Negative   Leuk Esterase: Large / RBC: 27 /HPF / WBC 43 /HPF   Sq Epi: x / Non Sq Epi: 3 /HPF / Bacteria: Negative        Cultures:      12 Lead ECG:   Ventricular Rate 63 BPM    Atrial Rate 63 BPM    QRS Duration 72 ms    Q-T Interval 410 ms    QTC Calculation(Bazett) 419 ms    R Axis -15 degrees    T Axis 3 degrees    Diagnosis Line Junctional rhythm  Abnormal ECG    Confirmed by Anthony Lawler ([id number]) on 2022 3:54:23 PM (22 @ 14:11)        < from: CT Angio Chest Aorta w/wo IV Cont (22 @ 10:07) >  IMPRESSION:    1. Severe aneurysmal dilatation of the descending thoracic aorta,   measuring 6.3 x 5.9 cm at its mid-portion, with evidence of intramural   hematoma, possibly subacute, versus chronic dissection with a thrombosed   false lumen. Aortic arch measures 5.6 cm. Descending thoracic aorta   measures 4.5 cm at the level of the diaphragmatic hiatus.    2. Since 2022, moderate size left and small right pleural   effusion; unchanged moderate pericardial effusion.    3. No significant change in hypoenhancement involving the left renal   interpolar region and left upper pole, possibly reflecting developing   infarct versus acute pyelonephritis.    4. Remainder of findings are overall unchanged on this short-term   follow-up exam.      A callback was placed at the time of dictation.    --- End of Report ---    ***Please see the addendum at the top of this report. It may contain   additional important information or changes.****    < end of copied text >      < from: TTE Echo Complete w/o Contrast w/ Doppler (22 @ 15:01) >  Summary:   1. Left ventricular ejection fraction, by visual estimation, is 55 to   60%.   2. Mild thickening and calcification of the anterior and posterior   mitral valve leaflets.   3. Mild to moderate mitral annular calcification.   4. Calcified Aortic valve with decreased leaflet opening. Peak/mean   transaortic gradient 32/20 mmHg. SIMONA VTI 1.4 cm2. Findings are suggestive   of mild to moderate AS.   5. Mild aortic regurgitation.   6. Mild tricuspid regurgitation.   7. Mild pulmonic valve regurgitation.   8. Moderate pericardial effusion.   9. No definite evidence of pericardial tamponade. Consider repeat exam   with ECG and respirometer signal to evaluate for tamponade physiology.  10. Large pleural effusion in the left lateral region.  11. Technically limited exam. Patient is in fixed position due to pain.    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 and thrombosed false lumen  hypotensive overnight   patient c/o of left shoulder pain, otherwise no complaints    ======================  MEDICATIONS:  cefTRIAXone   IVPB 1000 milliGRAM(s) IV Intermittent every 24 hours  enoxaparin Injectable 40 milliGRAM(s) SubCutaneous every 24 hours  labetalol 100 milliGRAM(s) Oral daily  metroNIDAZOLE  IVPB 500 milliGRAM(s) IV Intermittent every 8 hours  pantoprazole    Tablet 40 milliGRAM(s) Oral before breakfast      ======================  OBJECTIVE:  T(F): 96.2 (09-14 @ 08:00), Max: 98.4 (09-13 @ 12:30)  HR: 76 (09-14 @ 08:03) (64 - 113)  BP: 106/60 (09-14 @ 08:03) (73/51 - 125/77)  RR: 18 (09-14 @ 08:03) (17 - 18)  SpO2: 99% (09-14 @ 08:03) (97% - 99%)    I/O:      09-13 @ 07:01  -  09-14 @ 07:00  --------------------------------------------------------  IN: 0 mL / OUT: 150 mL / NET: -150 mL      PHYSICAL EXAMINATION:  GEN: NAD  NECK: no JVD  LUNGS: Clear to auscultation bilaterally   CARDIOVASCULAR: RRR, S1S2, no murmurs, no LE edema  ABD: Soft, non-tender, non-distended  EXT: No FABI  SKIN: Intact    ======================    LABS:                          12.8   7.15  )-----------( 167      ( 13 Sep 2022 07:34 )             38.7     09-13    139  |  104  |  14  ----------------------------<  118<H>  4.1   |  24  |  0.9    Ca    8.5      13 Sep 2022 07:34  Mg     1.9     09-13    TPro  6.3  /  Alb  3.6  /  TBili  0.7  /  DBili  x   /  AST  17  /  ALT  10  /  AlkPhos  127<H>  09-13    LIVER FUNCTIONS - ( 13 Sep 2022 07:34 )  Alb: 3.6 g/dL / Pro: 6.3 g/dL / ALK PHOS: 127 U/L / ALT: 10 U/L / AST: 17 U/L / GGT: x                 < from: CT Angio Chest Aorta w/wo IV Cont (09.13.22 @ 10:07) >  IMPRESSION:    1. Severe aneurysmal dilatation of the descending thoracic aorta,   measuring 6.3 x 5.9 cm at its mid-portion, with evidence of intramural   hematoma, possibly subacute, versus chronic dissection with a thrombosed   false lumen. Aortic arch measures 5.6 cm. Descending thoracic aorta   measures 4.5 cm at the level of the diaphragmatic hiatus.    2. Since September 12, 2022, moderate size left and small right pleural   effusion; unchanged moderate pericardial effusion.    3. No significant change in hypoenhancement involving the left renal   interpolar region and left upper pole, possibly reflecting developing   infarct versus acute pyelonephritis.    4. Remainder of findings are overall unchanged on this short-term   follow-up exam.      A callback was placed at the time of dictation.    --- End of Report ---    ***Please see the addendum at the top of this report. It may contain   additional important information or changes.****    < end of copied text >        < from: TTE Echo Complete w/o Contrast w/ Doppler (09.13.22 @ 15:01) >  Summary:   1. Left ventricular ejection fraction, by visual estimation, is 55 to   60%.   2. Mild thickening and calcification of the anterior and posterior   mitral valve leaflets.   3. Mild to moderate mitral annular calcification.   4. Calcified Aortic valve with decreased leaflet opening. Peak/mean   transaortic gradient 32/20 mmHg. SIMONA VTI 1.4 cm2. Findings are suggestive   of mild to moderate AS.   5. Mild aortic regurgitation.   6. Mild tricuspid regurgitation.   7. Mild pulmonic valve regurgitation.   8. Moderate pericardial effusion.   9. No definite evidence of pericardial tamponade. Consider repeat exam   with ECG and respirometer signal to evaluate for tamponade physiology.  10. Large pleural effusion in the left lateral region.  11. Technically limited exam. Patient is in fixed position due to pain.    < end of copied text >        < from: CT Angio Chest Aorta w/wo IV Cont (09.14.22 @ 11:58) >  IMPRESSION:    Unchanged type B intramural hematoma/aortic dissection within an   aneurysmal thoracic aorta measuring up to 6.3 cm.    Unchanged moderate pericardial effusion.    Stable left pleural effusion with adjacent atelectasis.    < end of copied text >

## 2022-09-14 NOTE — CONSULT NOTE ADULT - SUBJECTIVE AND OBJECTIVE BOX
SULEMA POZO  86y, Female  Allergy: Allergy Status Unknown      All historical available data reviewed.    HPI:  86F with no known PMHx presents to the ED for evaluation of abd pain, watery diarrhea, and NBNB vomiting. The pt is a poor historian, no family at bedside; lives alone at home and her  passed away. She presents today with persistent watery diarrhea and NBNB vomiting of unclear duration. She denies any recent sick contacts, food from outside, recent travel, or recent infections. She is forgetful and doesn't know exactly why she's there at the time of my exam. She denies any CP, SOB, palpitations, headaches, blurry vision, abd pain, constipation, dysuria, incontinence, or any  symptoms.    In the ED: /85 bilaterally, , T 97.6F, RR 16 satting 100% on RA. Labs notable for WBC 13k (91% N), Cr 1.1 BUN 19 (no baseline). Elevated but hemolyzed ALP and AST. UA +ve for large LE and pyuria.     CT AP shows   < from: CT Abdomen and Pelvis w/ IV Cont (09.12.22 @ 15:31) >  Colitis involving the descending and sigmoid colon.    Patchy hypoenhancement of the left kidney. Differential includes acute   pyelonephritis versus renal infarcts. Clinical correlation is needed.    Partially imaged descending thoracic aorta is aneurysmal measuring up to   5.7 cm. Abdominal aorta is also aneurysmal measuring up to 4.3 cm.   Extensive mural plaque is noted throughout the aorta. Recommend chest CT   follow-up to fully assess the thoracic aorta.    Partially imaged moderate pericardial effusion.    Partially imaged bilateral pleural effusions with compressive atelectasis.    Hypoattenuating rounded 1.4 cm uterine lesion, indeterminate. Bilateral   adnexal cysts measuring up to 3.2 cm on the right. Recommend evaluation   with pelvic sonography.    Indeterminate 1.5 cm hypoattenuating splenic lesion. This can be followed   up with outpatient contrast-enhanced MRI.    < end of copied text >    She was also found to have Covid-19. She was given 1L LR, cipro, and flagyl, and admitted to medicine. (13 Sep 2022 00:45)    FAMILY HISTORY:    PAST MEDICAL & SURGICAL HISTORY:        VITALS:  T(F): 96.2, Max: 96.2 (09-14-22 @ 08:00)  HR: 69  BP: 116/53  RR: 18Vital Signs Last 24 Hrs  T(C): 35.7 (14 Sep 2022 08:00), Max: 35.7 (14 Sep 2022 08:00)  T(F): 96.2 (14 Sep 2022 08:00), Max: 96.2 (14 Sep 2022 08:00)  HR: 69 (14 Sep 2022 12:00) (65 - 113)  BP: 116/53 (14 Sep 2022 12:00) (73/51 - 125/77)  BP(mean): 69 (14 Sep 2022 00:30) (57 - 95)  RR: 18 (14 Sep 2022 12:00) (17 - 18)  SpO2: 99% (14 Sep 2022 12:00) (98% - 99%)    Parameters below as of 14 Sep 2022 12:00  Patient On (Oxygen Delivery Method): room air        TESTS & MEASUREMENTS:                        12.8   7.15  )-----------( 167      ( 13 Sep 2022 07:34 )             38.7     09-13    139  |  104  |  14  ----------------------------<  118<H>  4.1   |  24  |  0.9    Ca    8.5      13 Sep 2022 07:34  Mg     1.9     09-13    TPro  6.3  /  Alb  3.6  /  TBili  0.7  /  DBili  x   /  AST  17  /  ALT  10  /  AlkPhos  127<H>  09-13    LIVER FUNCTIONS - ( 13 Sep 2022 07:34 )  Alb: 3.6 g/dL / Pro: 6.3 g/dL / ALK PHOS: 127 U/L / ALT: 10 U/L / AST: 17 U/L / GGT: x                   RADIOLOGY & ADDITIONAL TESTS:  Personal review of radiological diagnostics performed  Echo and EKG results noted when applicable.     MEDICATIONS:  acetaminophen     Tablet .. 650 milliGRAM(s) Oral every 6 hours PRN  cefTRIAXone   IVPB 1000 milliGRAM(s) IV Intermittent every 24 hours  enoxaparin Injectable 40 milliGRAM(s) SubCutaneous every 24 hours  labetalol 100 milliGRAM(s) Oral daily  melatonin 5 milliGRAM(s) Oral at bedtime PRN  metroNIDAZOLE  IVPB 500 milliGRAM(s) IV Intermittent every 8 hours  pantoprazole    Tablet 40 milliGRAM(s) Oral before breakfast      ANTIBIOTICS:  cefTRIAXone   IVPB 1000 milliGRAM(s) IV Intermittent every 24 hours  metroNIDAZOLE  IVPB 500 milliGRAM(s) IV Intermittent every 8 hours

## 2022-09-15 LAB
ALBUMIN SERPL ELPH-MCNC: 2.8 G/DL — LOW (ref 3.5–5.2)
ALP SERPL-CCNC: 94 U/L — SIGNIFICANT CHANGE UP (ref 30–115)
ALT FLD-CCNC: 6 U/L — SIGNIFICANT CHANGE UP (ref 0–41)
ANION GAP SERPL CALC-SCNC: 10 MMOL/L — SIGNIFICANT CHANGE UP (ref 7–14)
AST SERPL-CCNC: 12 U/L — SIGNIFICANT CHANGE UP (ref 0–41)
BASOPHILS # BLD AUTO: 0.03 K/UL — SIGNIFICANT CHANGE UP (ref 0–0.2)
BASOPHILS NFR BLD AUTO: 0.6 % — SIGNIFICANT CHANGE UP (ref 0–1)
BILIRUB SERPL-MCNC: 0.4 MG/DL — SIGNIFICANT CHANGE UP (ref 0.2–1.2)
BUN SERPL-MCNC: 10 MG/DL — SIGNIFICANT CHANGE UP (ref 10–20)
CALCIUM SERPL-MCNC: 7.7 MG/DL — LOW (ref 8.4–10.5)
CHLORIDE SERPL-SCNC: 108 MMOL/L — SIGNIFICANT CHANGE UP (ref 98–110)
CO2 SERPL-SCNC: 21 MMOL/L — SIGNIFICANT CHANGE UP (ref 17–32)
CREAT SERPL-MCNC: 0.8 MG/DL — SIGNIFICANT CHANGE UP (ref 0.7–1.5)
EGFR: 73 ML/MIN/1.73M2 — SIGNIFICANT CHANGE UP
EOSINOPHIL # BLD AUTO: 0.1 K/UL — SIGNIFICANT CHANGE UP (ref 0–0.7)
EOSINOPHIL NFR BLD AUTO: 2 % — SIGNIFICANT CHANGE UP (ref 0–8)
GLUCOSE SERPL-MCNC: 96 MG/DL — SIGNIFICANT CHANGE UP (ref 70–99)
HCT VFR BLD CALC: 33.3 % — LOW (ref 37–47)
HGB BLD-MCNC: 11 G/DL — LOW (ref 12–16)
IMM GRANULOCYTES NFR BLD AUTO: 0.2 % — SIGNIFICANT CHANGE UP (ref 0.1–0.3)
LYMPHOCYTES # BLD AUTO: 0.58 K/UL — LOW (ref 1.2–3.4)
LYMPHOCYTES # BLD AUTO: 11.7 % — LOW (ref 20.5–51.1)
MAGNESIUM SERPL-MCNC: 1.6 MG/DL — LOW (ref 1.8–2.4)
MCHC RBC-ENTMCNC: 29.6 PG — SIGNIFICANT CHANGE UP (ref 27–31)
MCHC RBC-ENTMCNC: 33 G/DL — SIGNIFICANT CHANGE UP (ref 32–37)
MCV RBC AUTO: 89.8 FL — SIGNIFICANT CHANGE UP (ref 81–99)
MONOCYTES # BLD AUTO: 0.57 K/UL — SIGNIFICANT CHANGE UP (ref 0.1–0.6)
MONOCYTES NFR BLD AUTO: 11.5 % — HIGH (ref 1.7–9.3)
NEUTROPHILS # BLD AUTO: 3.66 K/UL — SIGNIFICANT CHANGE UP (ref 1.4–6.5)
NEUTROPHILS NFR BLD AUTO: 74 % — SIGNIFICANT CHANGE UP (ref 42.2–75.2)
NRBC # BLD: 0 /100 WBCS — SIGNIFICANT CHANGE UP (ref 0–0)
PLATELET # BLD AUTO: 146 K/UL — SIGNIFICANT CHANGE UP (ref 130–400)
POTASSIUM SERPL-MCNC: 3.4 MMOL/L — LOW (ref 3.5–5)
POTASSIUM SERPL-SCNC: 3.4 MMOL/L — LOW (ref 3.5–5)
PROT SERPL-MCNC: 5.1 G/DL — LOW (ref 6–8)
RBC # BLD: 3.71 M/UL — LOW (ref 4.2–5.4)
RBC # FLD: 14.6 % — HIGH (ref 11.5–14.5)
SODIUM SERPL-SCNC: 139 MMOL/L — SIGNIFICANT CHANGE UP (ref 135–146)
WBC # BLD: 4.95 K/UL — SIGNIFICANT CHANGE UP (ref 4.8–10.8)
WBC # FLD AUTO: 4.95 K/UL — SIGNIFICANT CHANGE UP (ref 4.8–10.8)

## 2022-09-15 PROCEDURE — 99233 SBSQ HOSP IP/OBS HIGH 50: CPT

## 2022-09-15 RX ORDER — POTASSIUM CHLORIDE 20 MEQ
40 PACKET (EA) ORAL ONCE
Refills: 0 | Status: COMPLETED | OUTPATIENT
Start: 2022-09-15 | End: 2022-09-15

## 2022-09-15 RX ORDER — MAGNESIUM SULFATE 500 MG/ML
2 VIAL (ML) INJECTION ONCE
Refills: 0 | Status: DISCONTINUED | OUTPATIENT
Start: 2022-09-15 | End: 2022-09-15

## 2022-09-15 RX ORDER — MAGNESIUM SULFATE 500 MG/ML
2 VIAL (ML) INJECTION ONCE
Refills: 0 | Status: COMPLETED | OUTPATIENT
Start: 2022-09-15 | End: 2022-09-15

## 2022-09-15 RX ADMIN — Medication 100 MILLIGRAM(S): at 06:23

## 2022-09-15 RX ADMIN — Medication 650 MILLIGRAM(S): at 06:22

## 2022-09-15 RX ADMIN — LIDOCAINE 1 PATCH: 4 CREAM TOPICAL at 17:56

## 2022-09-15 RX ADMIN — ENOXAPARIN SODIUM 40 MILLIGRAM(S): 100 INJECTION SUBCUTANEOUS at 12:48

## 2022-09-15 RX ADMIN — Medication 40 MILLIEQUIVALENT(S): at 12:47

## 2022-09-15 RX ADMIN — CEFTRIAXONE 100 MILLIGRAM(S): 500 INJECTION, POWDER, FOR SOLUTION INTRAMUSCULAR; INTRAVENOUS at 12:58

## 2022-09-15 RX ADMIN — Medication 25 GRAM(S): at 11:28

## 2022-09-15 RX ADMIN — LIDOCAINE 1 PATCH: 4 CREAM TOPICAL at 13:06

## 2022-09-15 RX ADMIN — PANTOPRAZOLE SODIUM 40 MILLIGRAM(S): 20 TABLET, DELAYED RELEASE ORAL at 06:22

## 2022-09-15 RX ADMIN — Medication 650 MILLIGRAM(S): at 07:00

## 2022-09-15 RX ADMIN — LIDOCAINE 1 PATCH: 4 CREAM TOPICAL at 12:49

## 2022-09-15 RX ADMIN — LIDOCAINE 1 PATCH: 4 CREAM TOPICAL at 12:48

## 2022-09-15 NOTE — PHYSICAL THERAPY INITIAL EVALUATION ADULT - SPECIFY REASON(S)
Pt. attempted to be seen for PT IE this afternoon. Pt. refused to participate, stating that she was extremely cold and did not want to get up right now.

## 2022-09-15 NOTE — PROGRESS NOTE ADULT - SUBJECTIVE AND OBJECTIVE BOX
SUBJECTIVE:    Patient is a 86y old Female who presents with a chief complaint of Colitis, aortic aneurysm (14 Sep 2022 14:52)    Currently admitted to medicine with the primary diagnosis of Acute colitis       Today is hospital day 3d.     PAST MEDICAL & SURGICAL HISTORY    ALLERGIES:  Allergy Status Unknown    MEDICATIONS:  STANDING MEDICATIONS  cefTRIAXone   IVPB 1000 milliGRAM(s) IV Intermittent every 24 hours  enoxaparin Injectable 40 milliGRAM(s) SubCutaneous every 24 hours  labetalol 100 milliGRAM(s) Oral daily  lidocaine   4% Patch 1 Patch Transdermal daily  magnesium sulfate  IVPB 2 Gram(s) IV Intermittent once  metroNIDAZOLE  IVPB 500 milliGRAM(s) IV Intermittent every 8 hours  pantoprazole    Tablet 40 milliGRAM(s) Oral before breakfast  potassium chloride   Powder 40 milliEquivalent(s) Oral once    PRN MEDICATIONS  acetaminophen     Tablet .. 650 milliGRAM(s) Oral every 6 hours PRN  melatonin 5 milliGRAM(s) Oral at bedtime PRN    VITALS:   T(F): 97.4  HR: 81  BP: 119/69  RR: 18  SpO2: 98%    LABS:                        11.0   4.95  )-----------( 146      ( 15 Sep 2022 08:06 )             33.3     09-15    139  |  108  |  10  ----------------------------<  96  3.4<L>   |  21  |  0.8    Ca    7.7<L>      15 Sep 2022 08:06  Mg     1.6     09-15    TPro  5.1<L>  /  Alb  2.8<L>  /  TBili  0.4  /  DBili  x   /  AST  12  /  ALT  6   /  AlkPhos  94  09-15              Culture - Blood (collected 13 Sep 2022 17:22)  Source: .Blood Blood  Preliminary Report (15 Sep 2022 03:02):    No growth to date.    Culture - Blood (collected 13 Sep 2022 07:34)  Source: .Blood Blood-Peripheral  Preliminary Report (14 Sep 2022 18:01):    No growth to date.          RADIOLOGY:    PHYSICAL EXAM:  GEN: No acute distress  LUNGS: Clear to auscultation bilaterally   HEART: S1/S2 present. RRR.   ABD/ GI: Soft, non-tender, non-distended. Bowel sounds present  EXT: NC/NC/NE/2+PP/BALL  NEURO: AAOX3

## 2022-09-15 NOTE — PROGRESS NOTE ADULT - SUBJECTIVE AND OBJECTIVE BOX
24H events:    Patient is a 86y old Female who presents with a chief complaint of Colitis, aortic aneurysm (15 Sep 2022 12:38)    Primary diagnosis of Acute colitis       Today is hospital day 3d.      Patient seen and examined at bedside. Reports no active complaints.     PAST MEDICAL & SURGICAL HISTORY    SOCIAL HISTORY:  Negative for smoking/alcohol/drug use.     ALLERGIES:  Allergy Status Unknown    MEDICATIONS:  STANDING MEDICATIONS  enoxaparin Injectable 40 milliGRAM(s) SubCutaneous every 24 hours  labetalol 100 milliGRAM(s) Oral daily  lidocaine   4% Patch 1 Patch Transdermal daily  pantoprazole    Tablet 40 milliGRAM(s) Oral before breakfast    PRN MEDICATIONS  acetaminophen     Tablet .. 650 milliGRAM(s) Oral every 6 hours PRN  melatonin 5 milliGRAM(s) Oral at bedtime PRN    VITALS:   T(F): 97.4  HR: 81  BP: 119/69  RR: 18  SpO2: 98%    LABS:                        11.0   4.95  )-----------( 146      ( 15 Sep 2022 08:06 )             33.3     09-15    139  |  108  |  10  ----------------------------<  96  3.4<L>   |  21  |  0.8    Ca    7.7<L>      15 Sep 2022 08:06  Mg     1.6     09-15    TPro  5.1<L>  /  Alb  2.8<L>  /  TBili  0.4  /  DBili  x   /  AST  12  /  ALT  6   /  AlkPhos  94  09-15              Culture - Blood (collected 13 Sep 2022 17:22)  Source: .Blood Blood  Preliminary Report (15 Sep 2022 03:02):    No growth to date.    Culture - Blood (collected 13 Sep 2022 07:34)  Source: .Blood Blood-Peripheral  Preliminary Report (14 Sep 2022 18:01):    No growth to date.          RADIOLOGY:    PHYSICAL EXAM:  GEN: No acute distress  LUNGS: Clear to auscultation bilaterally   HEART: S1/S2 present. RRR.   ABD/ GI: Soft, non-tender, non-distended. Bowel sounds present  EXT: NC/NC/NE/2+PP/BALL  NEURO: AAOX3

## 2022-09-15 NOTE — PROGRESS NOTE ADULT - ASSESSMENT
86F no PMHx here with abd pain, found to have colitis. 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 Moderate pericardial effusion. L pleural effusion.    #Abd pain, with diarrhea, nausea, vomiting  presumed due to colitis  ct abd with descending, sigmoid colitis; renal infarcts vs. pyelo  resolving  f/u bcx is negative and urine cx was never sent  cont ceftriaxone, flagyl  diet as tolerated--advance today--check C diff    # Covid positive--asymptomatic    #Descending aortic aneurysm  cta with dissection vs. intramural hematomaPartially imaged descending thoracic aorta is aneurysmal measuring up to   5.7 cm. Abdominal aorta is also aneurysmal measuring up to 4.3 cm.   Extensive mural plaque is noted throughout the aorta.  Partially imaged moderate pericardial effusion.  Partially imaged bilateral pleural effusions with compressive atelectasis.    no intervention per CT surgery  sbp goal <120  labetalol 100mg daily      #Pericardial effusion, moderate  tte with preserved ef, no evidence tamponade  f/u cards    #L pleural effusion  cxr/ ct with opacity, presumed atelectasis  no thora per pulm  no hypoxia, satting well on ra    #Splenic lesion  incidentally noted on ct  outpt f/u with mri    #Uterine/ adnexal lesion  incidentally noted on ct  outpt f/u    #DVT ppx  lovenox    DC plan if can ambulate and can tolerate diet and has no diarrhea. 86F no PMHx here with abd pain, found to have colitis. 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 Moderate pericardial effusion. L pleural effusion.    #Abd pain, with diarrhea, nausea, vomiting  presumed due to colitis  ct abd with descending, sigmoid colitis; renal infarcts vs. pyelo  resolving  f/u bcx is negative and urine cx was never sent  DC  ceftriaxone, flagyl---as per ID  diet as tolerated--advance today--check C diff  Asymptomatic pyuria    # Covid positive--asymptomatic    #Descending aortic aneurysm  cta with dissection vs. intramural hematomaPartially imaged descending thoracic aorta is aneurysmal measuring up to   5.7 cm. Abdominal aorta is also aneurysmal measuring up to 4.3 cm.   Extensive mural plaque is noted throughout the aorta.  Partially imaged moderate pericardial effusion.  Partially imaged bilateral pleural effusions with compressive atelectasis.    no intervention per CT surgery  sbp goal <120  labetalol 100mg daily      #Pericardial effusion, moderate  tte with preserved ef, no evidence tamponade  f/u cards    #L pleural effusion  cxr/ ct with opacity, presumed atelectasis  no thora per pulm  no hypoxia, satting well on ra    #Splenic lesion  incidentally noted on ct  outpt f/u with mri    #Uterine/ adnexal lesion  incidentally noted on ct  outpt f/u    #DVT ppx  lovenox    DC plan if can ambulate and can tolerate diet and has no diarrhea. PT eval.

## 2022-09-15 NOTE — PROGRESS NOTE ADULT - ASSESSMENT
86F no PMHx here with abd pain, found to have colitis. 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 Moderate pericardial effusion. L pleural effusion.    #Abd pain, with diarrhea, nausea, vomiting  -presumed due to colitis  -ct abd with descending, sigmoid colitis; renal infarcts vs. pyelo  -resolving  -f/u bcx is negative and urine cx was never sent  -DC  ceftriaxone, flagyl---as per ID  -diet as tolerated--advance today--check C diff  -Asymptomatic pyuria    # Covid positive--asymptomatic  -No rx as per ID    #Descending aortic aneurysm  -cta with dissection vs. intramural hematoma Partially imaged descending thoracic aorta is aneurysmal measuring up to  5.7 cm. Abdominal aorta is also aneurysmal measuring up to 4.3 cm.   Extensive mural plaque is noted throughout the aorta.  Partially imaged moderate pericardial effusion.  Partially imaged bilateral pleural effusions with compressive atelectasis.  no intervention per CT surgery  sbp goal <120  labetalol 100mg daily      #Pericardial effusion, moderate  tte with preserved ef, no evidence tamponade  f/u cards    #L pleural effusion  cxr/ ct with opacity, presumed atelectasis  no thora per pulm  no hypoxia, satting well on ra    #Splenic lesion  incidentally noted on ct  outpt f/u with mri    #Uterine/ adnexal lesion  incidentally noted on ct  outpt f/u    #DVT ppx  lovenox    DC plan if can ambulate and can tolerate diet and has no diarrhea. PT eval.

## 2022-09-16 VITALS
DIASTOLIC BLOOD PRESSURE: 67 MMHG | SYSTOLIC BLOOD PRESSURE: 114 MMHG | HEART RATE: 66 BPM | TEMPERATURE: 97 F | RESPIRATION RATE: 18 BRPM

## 2022-09-16 LAB
ALBUMIN SERPL ELPH-MCNC: 3.2 G/DL — LOW (ref 3.5–5.2)
ALP SERPL-CCNC: 103 U/L — SIGNIFICANT CHANGE UP (ref 30–115)
ALT FLD-CCNC: 6 U/L — SIGNIFICANT CHANGE UP (ref 0–41)
ANION GAP SERPL CALC-SCNC: 10 MMOL/L — SIGNIFICANT CHANGE UP (ref 7–14)
AST SERPL-CCNC: 13 U/L — SIGNIFICANT CHANGE UP (ref 0–41)
BASOPHILS # BLD AUTO: 0.02 K/UL — SIGNIFICANT CHANGE UP (ref 0–0.2)
BASOPHILS NFR BLD AUTO: 0.4 % — SIGNIFICANT CHANGE UP (ref 0–1)
BILIRUB SERPL-MCNC: 0.4 MG/DL — SIGNIFICANT CHANGE UP (ref 0.2–1.2)
BUN SERPL-MCNC: 10 MG/DL — SIGNIFICANT CHANGE UP (ref 10–20)
CALCIUM SERPL-MCNC: 8.1 MG/DL — LOW (ref 8.4–10.5)
CHLORIDE SERPL-SCNC: 103 MMOL/L — SIGNIFICANT CHANGE UP (ref 98–110)
CO2 SERPL-SCNC: 22 MMOL/L — SIGNIFICANT CHANGE UP (ref 17–32)
CREAT SERPL-MCNC: 0.8 MG/DL — SIGNIFICANT CHANGE UP (ref 0.7–1.5)
EGFR: 73 ML/MIN/1.73M2 — SIGNIFICANT CHANGE UP
EOSINOPHIL # BLD AUTO: 0.1 K/UL — SIGNIFICANT CHANGE UP (ref 0–0.7)
EOSINOPHIL NFR BLD AUTO: 1.8 % — SIGNIFICANT CHANGE UP (ref 0–8)
GLUCOSE SERPL-MCNC: 131 MG/DL — HIGH (ref 70–99)
HCT VFR BLD CALC: 36 % — LOW (ref 37–47)
HGB BLD-MCNC: 11.8 G/DL — LOW (ref 12–16)
IMM GRANULOCYTES NFR BLD AUTO: 0.2 % — SIGNIFICANT CHANGE UP (ref 0.1–0.3)
LYMPHOCYTES # BLD AUTO: 0.49 K/UL — LOW (ref 1.2–3.4)
LYMPHOCYTES # BLD AUTO: 9 % — LOW (ref 20.5–51.1)
MAGNESIUM SERPL-MCNC: 2.1 MG/DL — SIGNIFICANT CHANGE UP (ref 1.8–2.4)
MCHC RBC-ENTMCNC: 29.4 PG — SIGNIFICANT CHANGE UP (ref 27–31)
MCHC RBC-ENTMCNC: 32.8 G/DL — SIGNIFICANT CHANGE UP (ref 32–37)
MCV RBC AUTO: 89.8 FL — SIGNIFICANT CHANGE UP (ref 81–99)
MONOCYTES # BLD AUTO: 0.31 K/UL — SIGNIFICANT CHANGE UP (ref 0.1–0.6)
MONOCYTES NFR BLD AUTO: 5.7 % — SIGNIFICANT CHANGE UP (ref 1.7–9.3)
NEUTROPHILS # BLD AUTO: 4.53 K/UL — SIGNIFICANT CHANGE UP (ref 1.4–6.5)
NEUTROPHILS NFR BLD AUTO: 82.9 % — HIGH (ref 42.2–75.2)
NRBC # BLD: 0 /100 WBCS — SIGNIFICANT CHANGE UP (ref 0–0)
PHOSPHATE SERPL-MCNC: 2.9 MG/DL — SIGNIFICANT CHANGE UP (ref 2.1–4.9)
PLATELET # BLD AUTO: 178 K/UL — SIGNIFICANT CHANGE UP (ref 130–400)
POTASSIUM SERPL-MCNC: 4.1 MMOL/L — SIGNIFICANT CHANGE UP (ref 3.5–5)
POTASSIUM SERPL-SCNC: 4.1 MMOL/L — SIGNIFICANT CHANGE UP (ref 3.5–5)
PROT SERPL-MCNC: 5.4 G/DL — LOW (ref 6–8)
RBC # BLD: 4.01 M/UL — LOW (ref 4.2–5.4)
RBC # FLD: 14.6 % — HIGH (ref 11.5–14.5)
SODIUM SERPL-SCNC: 135 MMOL/L — SIGNIFICANT CHANGE UP (ref 135–146)
WBC # BLD: 5.46 K/UL — SIGNIFICANT CHANGE UP (ref 4.8–10.8)
WBC # FLD AUTO: 5.46 K/UL — SIGNIFICANT CHANGE UP (ref 4.8–10.8)

## 2022-09-16 PROCEDURE — 72110 X-RAY EXAM L-2 SPINE 4/>VWS: CPT | Mod: 26

## 2022-09-16 PROCEDURE — 99238 HOSP IP/OBS DSCHRG MGMT 30/<: CPT | Mod: GC

## 2022-09-16 PROCEDURE — 72070 X-RAY EXAM THORAC SPINE 2VWS: CPT | Mod: 26

## 2022-09-16 RX ORDER — PANTOPRAZOLE SODIUM 20 MG/1
1 TABLET, DELAYED RELEASE ORAL
Qty: 0 | Refills: 0 | DISCHARGE
Start: 2022-09-16

## 2022-09-16 RX ORDER — LABETALOL HCL 100 MG
1 TABLET ORAL
Qty: 0 | Refills: 0 | DISCHARGE
Start: 2022-09-16

## 2022-09-16 RX ORDER — LANOLIN ALCOHOL/MO/W.PET/CERES
1 CREAM (GRAM) TOPICAL
Qty: 0 | Refills: 0 | DISCHARGE
Start: 2022-09-16

## 2022-09-16 RX ORDER — OXYCODONE AND ACETAMINOPHEN 5; 325 MG/1; MG/1
1 TABLET ORAL ONCE
Refills: 0 | Status: DISCONTINUED | OUTPATIENT
Start: 2022-09-16 | End: 2022-09-16

## 2022-09-16 RX ADMIN — Medication 100 MILLIGRAM(S): at 06:27

## 2022-09-16 RX ADMIN — LIDOCAINE 1 PATCH: 4 CREAM TOPICAL at 12:15

## 2022-09-16 RX ADMIN — OXYCODONE AND ACETAMINOPHEN 1 TABLET(S): 5; 325 TABLET ORAL at 13:35

## 2022-09-16 RX ADMIN — LIDOCAINE 1 PATCH: 4 CREAM TOPICAL at 18:33

## 2022-09-16 RX ADMIN — PANTOPRAZOLE SODIUM 40 MILLIGRAM(S): 20 TABLET, DELAYED RELEASE ORAL at 06:28

## 2022-09-16 RX ADMIN — LIDOCAINE 1 PATCH: 4 CREAM TOPICAL at 00:54

## 2022-09-16 RX ADMIN — Medication 650 MILLIGRAM(S): at 12:14

## 2022-09-16 RX ADMIN — ENOXAPARIN SODIUM 40 MILLIGRAM(S): 100 INJECTION SUBCUTANEOUS at 12:14

## 2022-09-16 RX ADMIN — Medication 650 MILLIGRAM(S): at 13:15

## 2022-09-16 RX ADMIN — OXYCODONE AND ACETAMINOPHEN 1 TABLET(S): 5; 325 TABLET ORAL at 12:35

## 2022-09-16 NOTE — DISCHARGE NOTE PROVIDER - NSDCMRMEDTOKEN_GEN_ALL_CORE_FT
labetalol 100 mg oral tablet: 1 tab(s) orally once a day  melatonin 5 mg oral tablet: 1 tab(s) orally once a day (at bedtime), As needed, Insomnia  pantoprazole 40 mg oral delayed release tablet: 1 tab(s) orally once a day (before a meal)

## 2022-09-16 NOTE — PROGRESS NOTE ADULT - PROVIDER SPECIALTY LIST ADULT
Internal Medicine
Internal Medicine
Cardiology
Hospitalist
Internal Medicine
Vascular Surgery
Infectious Disease

## 2022-09-16 NOTE — PROGRESS NOTE ADULT - ASSESSMENT
Plan:  - I reviewed labs  - I reviewed radiology imagings  - I personally visualized the imagings  - I discussed the findings and imagings with  ________   87 y/o female unknown medical history presents to ED c/o diarrhea. Patient is a poor historian. reports she takes medicine on a daily basis but is not sure for what. Incidentally found thoracic AA on CT scan. states she lives at home alone. She denies any abdominal pain or back pain. States that she was not aware of the thoracic AA before.   She was re-scanned for c/o back pain, aneurysm without change. Patient has known chronic back pain.     Plan:  - I reviewed labs  - I reviewed radiology imagings  - I personally visualized the imagings  - I discussed the findings and imagings with Dr. Callahan:  - please, see attending's note below    SPECTRA [id number]

## 2022-09-16 NOTE — PROGRESS NOTE ADULT - TIME BILLING
86F no PMHx here with abd pain, found to have colitis. 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 Moderate pericardial effusion. L pleural effusion.    #Descending aortic aneurysm  cta with dissection vs. intramural hematoma  no intervention per cts  sbp goal <120  labetalol 100  f/u vasc, cts  #Abd pain, with diarrhea, nausea, vomiting  presumed due to colitis  ct abd with descending, sigmoid colitis; renal infarcts vs. pyelo  resolving  f/u ucx, bcx  cont ceftriaxone, flagyl  diet as tolerated  #Pericardial effusion, moderate  tte with preserved ef, no evidence tamponade  f/u cards  #L pleural effusion  cxr/ ct with opacity, presumed atelectasis  no thora per pulm  no hypoxia, satting well on ra  #Splenic lesion  incidentally noted on ct  outpt f/u with mri  #Uterine/ adnexal lesion  incidentally noted on ct  outpt f/u  #DVT ppx  lovenox    #Progress Note Handoff:  Pending (specify):  Consults_________, Tests________, Test Results_______, Other___f/u cts, vasc______  Family discussion: d/w pt at bedside re: treatment plan, primary dx  Disposition: Home___/SNF___/Other________/Unknown at this time____x____
Counseled staff about diagnostic testing and treatment plan. All questions answered.

## 2022-09-16 NOTE — PHYSICAL THERAPY INITIAL EVALUATION ADULT - ADDITIONAL COMMENTS
Pt lives alone in house with no EFRAIN, no steps inside. Pt has cleaning person 4-5x/week but was unable to recall. Pt ambulates with RW and SC.

## 2022-09-16 NOTE — DISCHARGE NOTE PROVIDER - CARE PROVIDER_API CALL
Yvonne Sierra)  Internal Medicine  [address] [place] [address]  Phone: [phone]  Fax: [phone]  Follow Up Time: 2 weeks

## 2022-09-16 NOTE — PHYSICAL THERAPY INITIAL EVALUATION ADULT - PERTINENT HX OF CURRENT PROBLEM, REHAB EVAL
86F with no known PMHx presents to the ED for evaluation of abd pain, watery diarrhea, and NBNB vomiting. The pt is a poor historian, no family at bedside; lives alone at home and her  passed away. She presents today with persistent watery diarrhea and NBNB vomiting of unclear duration. She denies any recent sick contacts, food from outside, recent travel, or recent infections. She is forgetful and doesn't know exactly why she's there at the time of my exam. She denies any CP, SOB, palpitations, headaches, blurry vision, abd pain, constipation, dysuria, incontinence, or any  symptoms.

## 2022-09-16 NOTE — DISCHARGE NOTE PROVIDER - YES NO FOR MLM POSITIVE OR NEGATIVE COVID RESULT
"ED Provider Note    CHIEF COMPLAINT  Chief Complaint   Patient presents with   • Cough     cough  headache  chest pain         HPI  Sonia Turner is a 61 y.o. female who presents with coughing, for the last 3 days, occasionally coughing to the point of gagging. Mild headache for the last 3 days. No gait problems. No speech problems. No vision problems. Does have a fever this morning. Aching all over. No vomiting no diarrhea no dysuria urgency or frequency. Did not obtain any fluid immunization this year    REVIEW OF SYSTEMS  See HPI for further details.Denies other G.I., G.U.. endrocine, cardiovascular, respriatory or neurological problems.  All other systems are negative.     PAST MEDICAL HISTORY  History reviewed. No pertinent past medical history.    FAMILY HISTORY  No family history on file.    SOCIAL HISTORY  Social History     Social History   • Marital status:      Spouse name: N/A   • Number of children: N/A   • Years of education: N/A     Social History Main Topics   • Smoking status: Never Smoker   • Smokeless tobacco: Not on file   • Alcohol use Yes      Comment: Wine OCC   • Drug use: No   • Sexual activity: Not on file     Other Topics Concern   • Not on file     Social History Narrative   • No narrative on file       SURGICAL HISTORY  Past Surgical History:   Procedure Laterality Date   • OTHER      oral surgery   • OTHER ORTHOPEDIC SURGERY      knee and shoulder   • PRIMARY C SECTION      times 2   • TONSILLECTOMY         CURRENT MEDICATIONS  Home Medications    **Home medications have not yet been reviewed for this encounter**         ALLERGIES  Allergies   Allergen Reactions   • Sulfa Drugs        PHYSICAL EXAM  VITAL SIGNS: /72   Pulse 97   Temp 37.8 °C (100.1 °F)   Resp 16   Ht 1.651 m (5' 5\")   Wt 79.7 kg (175 lb 11.3 oz)   LMP 12/15/2012   BMI 29.24 kg/m²   Constitutional: Well developed, Well nourished, No acute distress, Non-toxic appearance.   HENT: " . Normocephalic, Atraumatic, Bilateral external ears normal, Oropharynx moist, No oral exudates, Nose normal. Ears tympanic membranes are normal  Eyes: PERRL, EOMI, Conjunctiva normal, No discharge.   Neck: Normal range of motion, No tenderness, Supple, No stridor.   Lymphatic: No lymphadenopathy noted.   Cardiovascular: Normal heart rate, Normal rhythm, No murmurs, No rubs, No gallops.   Thorax & Lungs: Normal breath sounds, No respiratory distress, No wheezing, No chest tenderness.   Abdomen:  No tenderness, no guarding no rigidity and the abdomen is soft.  No masses, No pulsatile masses.  Skin: Warm, Dry, No erythema, No rash.   Back: No tenderness, No CVA tenderness.   Extremities: Intact distal pulses, No edema, No tenderness, No cyanosis, No clubbing.   Musculoskeletal: Good range of motion in all major joints. No tenderness to palpation or major deformities noted.   Neurologic: Alert & oriented x 3, Normal motor function, Normal sensory function, No focal deficits noted.   Psychiatric: Affect normal, Judgment normal, Mood normal.       RADIOLOGY/PROCEDURES      COURSE & MEDICAL DECISION MAKING  Pertinent Labs & Imaging studies reviewed. (See chart for details)    He has a fever, coughing, only mild headache, muscle aches concern for viral infection however also concern for pneumonia  FINAL IMPRESSION  1. Influenza  2.   3.     Disposition  Treated with Tamiflu, albuterolDischarge instructions are understood. This patient is to return if fever vomiting or no better in 12 hours. Follow up with the [facility] clinic or private physician. Information sheets on influenza  Electronically signed by: Yared Griffith, 1/3/2018 7:31 AM

## 2022-09-16 NOTE — PROGRESS NOTE ADULT - ASSESSMENT
· Assessment	  86F with no known PMHx presents to the ED for evaluation of abd pain, watery diarrhea, and NBNB vomiting. The pt is a poor historian, no family at bedside; lives alone at home and her  passed away. She presents today with persistent watery diarrhea and NBNB vomiting of unclear duration. She denies any recent sick contacts, food from outside, recent travel, or recent infections.  She denies any CP, SOB, palpitations, headaches, blurry vision, abd pain, constipation, dysuria, incontinence, or any  symptoms.    In the ED: /85 bilaterally, , T 97.6F, RR 16 satting 100% on RA.     9/12 CT AP IV Cont   Colitis involving the descending and sigmoid colon.  Patchy hypoenhancement of the left kidney. Differential includes acute pyelonephritis versus renal infarcts.   Partially imaged descending thoracic aorta is aneurysmal measuring up to 5.7 cm. Abdominal aorta is also aneurysmal measuring up to 4.3 cm.   Extensive mural plaque is noted throughout the aorta. Recommend chest CT   follow-up to fully assess the thoracic aorta.  Partially imaged moderate pericardial effusion.  Partially imaged bilateral pleural effusions with compressive atelectasis.  Hypoattenuating rounded 1.4 cm uterine lesion, indeterminate. Bilateral   adnexal cysts measuring up to 3.2 cm on the right. Recommend evaluation   with pelvic sonography.  Indeterminate 1.5 cm hypoattenuating splenic lesion. This can be followed     IMPRESSION;  COVID with a positive test and asymptomatic   Timeline of infection is unclear based on the clinical history   CXR left pleural effusion . No GGO  CT chest no GGO  doubt COVID-19 causing any pathology for now and as timeline uncertain will not recommen treatment  Ddimers 7717 > no PE> no COVID-19 related cytokine response    Clinically no evidence of left acute pyelonephritis ( despite having pyuria )    Colitis > CD NG. Resolving. GI manifestations of COVID-19 ? Generally would not give descending/sigmoid colitis    Aneurysmal descending/abd aorta > seems to be old as pt is aware of the findings.Await CTS input    WBc 13.3>7.1>5.4    9/13 BCx NG    RECOMMENDATIONS;  d/c Rocephin   Please do not hesitate to recall ID if any questions arise either through [employer] or through [employer]

## 2022-09-16 NOTE — PROGRESS NOTE ADULT - SUBJECTIVE AND OBJECTIVE BOX
SULEMA POZO  86y, Female    All available historical data reviewed    OVERNIGHT EVENTS:  feels well and has no new complaints  No fevers     ROS:  General: Denies rigors, nightsweats  HEENT: Denies headache, rhinorrhea, sore throat, eye pain  CV: Denies CP, palpitations  PULM: Denies wheezing, hemoptysis  GI: Denies hematemesis, hematochezia, melena  : Denies discharge, hematuria  MSK: Denies arthralgias, myalgias  SKIN: Denies rash, lesions  NEURO: Denies paresthesias, weakness  PSYCH: Denies depression, anxiety    VITALS:  T(F): 97.6, Max: 97.8 (09-15-22 @ 20:21)  HR: 69  BP: 118/65  RR: 18Vital Signs Last 24 Hrs  T(C): 36.4 (16 Sep 2022 05:00), Max: 36.6 (15 Sep 2022 20:21)  T(F): 97.6 (16 Sep 2022 05:00), Max: 97.8 (15 Sep 2022 20:21)  HR: 69 (16 Sep 2022 05:00) (66 - 69)  BP: 118/65 (16 Sep 2022 05:00) (102/61 - 118/65)  BP(mean): --  RR: 18 (16 Sep 2022 05:00) (18 - 18)  SpO2: --        TESTS & MEASUREMENTS:                        11.8   5.46  )-----------( 178      ( 16 Sep 2022 07:40 )             36.0     09-16    135  |  103  |  10  ----------------------------<  131<H>  4.1   |  22  |  0.8    Ca    8.1<L>      16 Sep 2022 07:40  Phos  2.9     09-16  Mg     2.1     09-16    TPro  5.4<L>  /  Alb  3.2<L>  /  TBili  0.4  /  DBili  x   /  AST  13  /  ALT  6   /  AlkPhos  103  09-16    LIVER FUNCTIONS - ( 16 Sep 2022 07:40 )  Alb: 3.2 g/dL / Pro: 5.4 g/dL / ALK PHOS: 103 U/L / ALT: 6 U/L / AST: 13 U/L / GGT: x             Culture - Blood (collected 09-13-22 @ 17:22)  Source: .Blood Blood  Preliminary Report (09-15-22 @ 03:02):    No growth to date.    Culture - Blood (collected 09-13-22 @ 07:34)  Source: .Blood Blood-Peripheral  Preliminary Report (09-14-22 @ 18:01):    No growth to date.            RADIOLOGY & ADDITIONAL TESTS:  Personal review of radiological diagnostics performed  Echo and EKG results noted when applicable.     MEDICATIONS:  acetaminophen     Tablet .. 650 milliGRAM(s) Oral every 6 hours PRN  enoxaparin Injectable 40 milliGRAM(s) SubCutaneous every 24 hours  labetalol 100 milliGRAM(s) Oral daily  lidocaine   4% Patch 1 Patch Transdermal daily  melatonin 5 milliGRAM(s) Oral at bedtime PRN  pantoprazole    Tablet 40 milliGRAM(s) Oral before breakfast      ANTIBIOTICS:

## 2022-09-16 NOTE — PROGRESS NOTE ADULT - REASON FOR ADMISSION
Colitis, aortic aneurysm

## 2022-09-16 NOTE — PROGRESS NOTE ADULT - NS ATTEND AMEND GEN_ALL_CORE FT
I reviewed the patients scan in detail. 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 She is not a candidate for an endovascular repair of the aneurysm. Repair  would require an open operation which carries with it a high risk of cardiopulmonary complications and high risk of paraplegia. Due to her medical comorbidities she is not a suitable candidate for this type of repair. There is no planned intervention at this time. continue treatment for colitis.

## 2022-09-16 NOTE — DISCHARGE NOTE NURSING/CASE MANAGEMENT/SOCIAL WORK - NSDCPEFALRISK_GEN_ALL_CORE
For information on Fall & Injury Prevention, visit: [email or web address] OR  [email or web address] OR  https://www.cdc.gov/steadi/patient.html

## 2022-09-16 NOTE — DISCHARGE NOTE PROVIDER - NSDCPNSUBOBJ_GEN_ALL_CORE
Pt was seen and examined 09/16/22.  reported having chronic back pain - pain meds provided. Pt to follow up with vascular as outpatient for intervention for aortic aneurysm.     Vital Signs Last 24 Hrs  T(C): 36.1 (16 Sep 2022 20:35), Max: 36.4 (16 Sep 2022 05:00)  T(F): 97 (16 Sep 2022 20:35), Max: 97.6 (16 Sep 2022 05:00)  HR: 66 (16 Sep 2022 20:35) (65 - 69)  BP: 114/67 (16 Sep 2022 20:35) (114/67 - 118/65)  BP(mean): --  RR: 18 (16 Sep 2022 20:35) (18 - 18)  SpO2: --

## 2022-09-16 NOTE — DISCHARGE NOTE PROVIDER - NSCORESITESY/N_GEN_A_CORE_RD
Quality 226: Preventive Care And Screening: Tobacco Use: Screening And Cessation Intervention: Patient screened for tobacco and never smoked Quality 130: Documentation Of Current Medications In The Medical Record: Current Medications Documented Quality 110: Preventive Care And Screening: Influenza Immunization: Influenza Immunization Ordered or Recommended, but not Administered due to system reason Detail Level: Detailed Quality 431: Preventive Care And Screening: Unhealthy Alcohol Use - Screening: Patient screened for unhealthy alcohol use using a single question and scores less than 2 times per year Quality 111:Pneumonia Vaccination Status For Older Adults: Pneumococcal Vaccination not Administered or Previously Received, Reason not Otherwise Specified No

## 2022-09-16 NOTE — DISCHARGE NOTE PROVIDER - NSDCCPCAREPLAN_GEN_ALL_CORE_FT
PRINCIPAL DISCHARGE DIAGNOSIS  Diagnosis: Acute colitis  Assessment and Plan of Treatment: You were admitted and managed here for acute colitis of large intestine.   Your infection has been treated successfully   You are medically stable for discharge  Please take your medications as prescribed and follow the instructions given to you regarding your health. Keep follow up appointments with your doctors as instructed.      SECONDARY DISCHARGE DIAGNOSES  Diagnosis: Aneurysm of abdominal aorta  Assessment and Plan of Treatment: descending thoracic aorta   Abdominal aorta  aneurysmal

## 2022-09-16 NOTE — PROGRESS NOTE ADULT - SUBJECTIVE AND OBJECTIVE BOX
VASCULAR SURGERY PROGRESS NOTE    CC:   Hospital Day #  Post-Op Day #    Procedure:    Events of past 24 hours:          ROS otherwise negative except per subjective and HPI      PAST MEDICAL & SURGICAL HISTORY:      Vital Signs Last 24 Hrs  T(C): 36.4 (16 Sep 2022 05:00), Max: 36.6 (15 Sep 2022 20:21)  T(F): 97.6 (16 Sep 2022 05:00), Max: 97.8 (15 Sep 2022 20:21)  HR: 69 (16 Sep 2022 05:00) (66 - 69)  BP: 118/65 (16 Sep 2022 05:00) (102/61 - 118/65)  BP(mean): --  RR: 18 (16 Sep 2022 05:00) (18 - 18)  SpO2: --        Pain (0-10):            Pain Control Adequate: [] YES [] N    Diet:    I&O's Detail    15 Sep 2022 07:01  -  16 Sep 2022 07:00  --------------------------------------------------------  IN:    IV PiggyBack: 100 mL    Oral Fluid: 600 mL  Total IN: 700 mL    OUT:    Voided (mL): 300 mL  Total OUT: 300 mL    Total NET: 400 mL          Bowel Movement: : [] YES [] NO  Flatus: : [] YES [] NO    PHYSICAL EXAM    Appearance: Normal	  HEENT:   Normal oral mucosa, PERRL, EOMI	  Neck: Supple, - JVD; Carotid Bruit   Cardiovascular: Normal S1 S2, No JVD, No murmurs,   Respiratory: Lungs clear to auscultation/Decreased Breath Sounds/No Rales, Rhonchi, Wheezing	  Gastrointestinal:  Soft, Non-tender, + BS	  Skin: No rashes, No ecchymoses, No cyanosis  Extremities: Normal range of motion, No clubbing, cyanosis or edema  Neurologic: Non-focal  Psychiatry: A & O x 3, Mood & affect appropriate    PULSES:  Femoral:  Popliteal:  Dorsal Pedal:  Posterior Tibial:  Capillary:      MEDICATIONS:   MEDICATIONS  (STANDING):  enoxaparin Injectable 40 milliGRAM(s) SubCutaneous every 24 hours  labetalol 100 milliGRAM(s) Oral daily  lidocaine   4% Patch 1 Patch Transdermal daily  pantoprazole    Tablet 40 milliGRAM(s) Oral before breakfast    MEDICATIONS  (PRN):  acetaminophen     Tablet .. 650 milliGRAM(s) Oral every 6 hours PRN Temp greater or equal to 38C (100.4F), Mild Pain (1 - 3)  melatonin 5 milliGRAM(s) Oral at bedtime PRN Insomnia      DVT PROPHYLAXIS: [] YES [] NO  GI PROPHYLAXIS: [] YES [] NO  ANTIPLATELETS: [] YES [] NO  ANTICOAGULATION: [] YES [] NO  ANTIBIOTICS: [] YES [] NO    LAB/STUDIES:                        11.8   5.46  )-----------( 178      ( 16 Sep 2022 07:40 )             36.0     09-15    139  |  108  |  10  ----------------------------<  96  3.4<L>   |  21  |  0.8    Ca    7.7<L>      15 Sep 2022 08:06  Mg     1.6     09-15    TPro  5.1<L>  /  Alb  2.8<L>  /  TBili  0.4  /  DBili  x   /  AST  12  /  ALT  6   /  AlkPhos  94  09-15      LIVER FUNCTIONS - ( 15 Sep 2022 08:06 )  Alb: 2.8 g/dL / Pro: 5.1 g/dL / ALK PHOS: 94 U/L / ALT: 6 U/L / AST: 12 U/L / GGT: x               Culture - Blood (collected 13 Sep 2022 17:22)  Source: .Blood Blood  Preliminary Report (15 Sep 2022 03:02):    No growth to date.        IMAGING:       VASCULAR SURGERY PROGRESS NOTE    CC: Colitis, aortic aneurysm      Events of past 24 hours: no active complaints          ROS otherwise negative except per subjective and HPI      PAST MEDICAL & SURGICAL HISTORY:      Vital Signs Last 24 Hrs  T(C): 36.4 (16 Sep 2022 05:00), Max: 36.6 (15 Sep 2022 20:21)  T(F): 97.6 (16 Sep 2022 05:00), Max: 97.8 (15 Sep 2022 20:21)  HR: 69 (16 Sep 2022 05:00) (66 - 69)  BP: 118/65 (16 Sep 2022 05:00) (102/61 - 118/65)  BP(mean): --  RR: 18 (16 Sep 2022 05:00) (18 - 18)  SpO2: --        Pain (0-10):            Pain Control Adequate: [] YES [] N    Diet:    I&O's Detail    15 Sep 2022 07:01  -  16 Sep 2022 07:00  --------------------------------------------------------  IN:    IV PiggyBack: 100 mL    Oral Fluid: 600 mL  Total IN: 700 mL    OUT:    Voided (mL): 300 mL  Total OUT: 300 mL    Total NET: 400 mL        PHYSICAL EXAM    Appearance: Normal	  HEENT:   Normal oral mucosa, PERRL, EOMI	  Neck: Supple, - JVD; Carotid Bruit   Cardiovascular: Normal S1 S2, No JVD, No murmurs,   Respiratory: Lungs clear to auscultation/No Rales, Rhonchi, Wheezing	  Gastrointestinal:  Soft, Non-tender, + BS	  Skin: No rashes, No ecchymoses, No cyanosis  Extremities: Normal range of motion, No clubbing, cyanosis or edema  Neurologic: Non-focal  Psychiatry: A & O x 3, Mood & affect appropriate          MEDICATIONS:   MEDICATIONS  (STANDING):  enoxaparin Injectable 40 milliGRAM(s) SubCutaneous every 24 hours  labetalol 100 milliGRAM(s) Oral daily  lidocaine   4% Patch 1 Patch Transdermal daily  pantoprazole    Tablet 40 milliGRAM(s) Oral before breakfast    MEDICATIONS  (PRN):  acetaminophen     Tablet .. 650 milliGRAM(s) Oral every 6 hours PRN Temp greater or equal to 38C (100.4F), Mild Pain (1 - 3)  melatonin 5 milliGRAM(s) Oral at bedtime PRN Insomnia        LAB/STUDIES:                        11.8   5.46  )-----------( 178      ( 16 Sep 2022 07:40 )             36.0     09-15    139  |  108  |  10  ----------------------------<  96  3.4<L>   |  21  |  0.8    Ca    7.7<L>      15 Sep 2022 08:06  Mg     1.6     09-15    TPro  5.1<L>  /  Alb  2.8<L>  /  TBili  0.4  /  DBili  x   /  AST  12  /  ALT  6   /  AlkPhos  94  09-15      LIVER FUNCTIONS - ( 15 Sep 2022 08:06 )  Alb: 2.8 g/dL / Pro: 5.1 g/dL / ALK PHOS: 94 U/L / ALT: 6 U/L / AST: 12 U/L / GGT: x               Culture - Blood (collected 13 Sep 2022 17:22)  Source: .Blood Blood  Preliminary Report (15 Sep 2022 03:02):    No growth to date.        IMAGING:  < from: CT Angio Chest Aorta w/wo IV Cont (09.14.22 @ 11:58) >  Unchanged aneurysmal descending thoracic aorta measuring up to 6.3   cm with an unchanged age-indeterminate intramural hematoma/dissection   extendinginto the visualized abdominal aorta. The aorta is stable in   size measuring up to 4.1 cm in the mid descending aorta, 5.6 cm at the   aortic arch, 4.5 cm at the aortic hiatus and 3.5 cm above the iliac   bifurcation. 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

## 2022-09-16 NOTE — PHYSICAL THERAPY INITIAL EVALUATION ADULT - GENERAL OBSERVATIONS, REHAB EVAL
Pt. educated on the benefits of participating and continued to refuse. Will continue to follow-up.
[phone] Pt received and left sitting in bedside chair, NAD, pt agreeable to PT session, +tele

## 2022-09-16 NOTE — DISCHARGE NOTE NURSING/CASE MANAGEMENT/SOCIAL WORK - PATIENT PORTAL LINK FT
You can access the FollowMyHealth Patient Portal offered by [facility] by registering at the following website: [email or web address]. By joining [employer]’s FollowMyHealth portal, you will also be able to view your health information using other applications (apps) compatible with our system.

## 2022-09-16 NOTE — DISCHARGE NOTE PROVIDER - HOSPITAL COURSE
86 year Female with no significant past medical history admitted here with abd pain, found to have colitis. CT shows Descending aortic aneurysm with chronic dissection vs intramural hematoma, Moderate pericardial effusion and L pleural effusion.    #Abd pain, with diarrhea, nausea, vomiting  -presumed due to colitis  -ct abd with descending, sigmoid colitis; renal infarcts vs. pyelo  -resolving  -f/u bcx is negative and urine cx was never sent  -DC  ceftriaxone, flagyl---as per ID  -diet as tolerated--advance today  -Asymptomatic pyuria    # Covid positive--asymptomatic  -No treatment as per ID, as patient has no complications and duration of covid is  unknown.     #Descending aortic aneurysm  -cta with dissection vs. intramural hematoma Partially imaged descending thoracic aorta is aneurysmal measuring up to  5.7 cm. Abdominal aorta is also aneurysmal measuring up to 4.3 cm.   Extensive mural plaque is noted throughout the aorta.  Partially imaged moderate pericardial effusion.  Partially imaged bilateral pleural effusions with compressive atelectasis.  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 She is not a candidate for an endovascular repair of the aneurysm. Repair  would require an open operation which carries with it a high risk of cardiopulmonary complications and high risk of paraplegia. Due to her medical comorbidities she is not a suitable candidate for this type of repair. There is no planned intervention at this time. continue treatment for colitis.    #Pericardial effusion, moderate  tte with preserved ef, no evidence tamponade  f/u cards    #L pleural effusion  cxr/ ct with opacity, presumed atelectasis  no thora per pulm  no hypoxia, satting well on ra    #Splenic lesion  incidentally noted on ct  outpt f/u with mri    #Uterine/ adnexal lesion  incidentally noted on ct  outpt f/u

## 2022-09-18 LAB
CULTURE RESULTS: SIGNIFICANT CHANGE UP
SPECIMEN SOURCE: SIGNIFICANT CHANGE UP

## 2022-09-19 LAB
CULTURE RESULTS: SIGNIFICANT CHANGE UP
SPECIMEN SOURCE: SIGNIFICANT CHANGE UP

## 2022-09-23 DIAGNOSIS — J90 PLEURAL EFFUSION, NOT ELSEWHERE CLASSIFIED: ICD-10-CM

## 2022-09-23 DIAGNOSIS — R82.81 PYURIA: ICD-10-CM

## 2022-09-23 DIAGNOSIS — N85.9 NONINFLAMMATORY DISORDER OF UTERUS, UNSPECIFIED: ICD-10-CM

## 2022-09-23 DIAGNOSIS — I71.6 THORACOABDOMINAL AORTIC ANEURYSM, WITHOUT RUPTURE: ICD-10-CM

## 2022-09-23 DIAGNOSIS — I70.0 ATHEROSCLEROSIS OF AORTA: ICD-10-CM

## 2022-09-23 DIAGNOSIS — J98.11 ATELECTASIS: ICD-10-CM

## 2022-09-23 DIAGNOSIS — U07.1 COVID-19: ICD-10-CM

## 2022-09-23 DIAGNOSIS — R19.7 DIARRHEA, UNSPECIFIED: ICD-10-CM

## 2022-09-23 DIAGNOSIS — N28.0 ISCHEMIA AND INFARCTION OF KIDNEY: ICD-10-CM

## 2022-09-23 DIAGNOSIS — I31.3 PERICARDIAL EFFUSION (NONINFLAMMATORY): ICD-10-CM

## 2022-09-23 DIAGNOSIS — I71.03 DISSECTION OF THORACOABDOMINAL AORTA: ICD-10-CM

## 2022-09-23 DIAGNOSIS — D73.89 OTHER DISEASES OF SPLEEN: ICD-10-CM

## 2022-09-23 DIAGNOSIS — K52.9 NONINFECTIVE GASTROENTERITIS AND COLITIS, UNSPECIFIED: ICD-10-CM

## 2022-12-22 ENCOUNTER — INPATIENT (INPATIENT)
Facility: HOSPITAL | Age: 87
LOS: 9 days | Discharge: NURSING, REHAB OR LONG-TERM CARE | End: 2023-01-01
Attending: HOSPITALIST | Admitting: HOSPITALIST
Payer: MEDICARE

## 2022-12-22 VITALS
RESPIRATION RATE: 20 BRPM | HEART RATE: 150 BPM | TEMPERATURE: 98 F | WEIGHT: 100.09 LBS | DIASTOLIC BLOOD PRESSURE: 57 MMHG | OXYGEN SATURATION: 99 % | SYSTOLIC BLOOD PRESSURE: 77 MMHG

## 2022-12-22 DIAGNOSIS — I27.20 PULMONARY HYPERTENSION, UNSPECIFIED: ICD-10-CM

## 2022-12-22 DIAGNOSIS — I08.2 RHEUMATIC DISORDERS OF BOTH AORTIC AND TRICUSPID VALVES: ICD-10-CM

## 2022-12-22 DIAGNOSIS — I95.9 HYPOTENSION, UNSPECIFIED: ICD-10-CM

## 2022-12-22 DIAGNOSIS — I47.1 SUPRAVENTRICULAR TACHYCARDIA: ICD-10-CM

## 2022-12-22 DIAGNOSIS — R00.0 TACHYCARDIA, UNSPECIFIED: ICD-10-CM

## 2022-12-22 DIAGNOSIS — I31.39 OTHER PERICARDIAL EFFUSION (NONINFLAMMATORY): ICD-10-CM

## 2022-12-22 DIAGNOSIS — I87.2 VENOUS INSUFFICIENCY (CHRONIC) (PERIPHERAL): ICD-10-CM

## 2022-12-22 DIAGNOSIS — U07.1 COVID-19: ICD-10-CM

## 2022-12-22 DIAGNOSIS — I71.23 ANEURYSM OF THE DESCENDING THORACIC AORTA, WITHOUT RUPTURE: ICD-10-CM

## 2022-12-22 DIAGNOSIS — I71.02 DISSECTION OF ABDOMINAL AORTA: ICD-10-CM

## 2022-12-22 LAB
ALBUMIN SERPL ELPH-MCNC: 4.1 G/DL — SIGNIFICANT CHANGE UP (ref 3.5–5.2)
ALP SERPL-CCNC: 161 U/L — HIGH (ref 30–115)
ALT FLD-CCNC: 21 U/L — SIGNIFICANT CHANGE UP (ref 0–41)
ANION GAP SERPL CALC-SCNC: 15 MMOL/L — HIGH (ref 7–14)
AST SERPL-CCNC: 28 U/L — SIGNIFICANT CHANGE UP (ref 0–41)
BASOPHILS # BLD AUTO: 0.02 K/UL — SIGNIFICANT CHANGE UP (ref 0–0.2)
BASOPHILS NFR BLD AUTO: 0.3 % — SIGNIFICANT CHANGE UP (ref 0–1)
BILIRUB SERPL-MCNC: 0.4 MG/DL — SIGNIFICANT CHANGE UP (ref 0.2–1.2)
BUN SERPL-MCNC: 23 MG/DL — HIGH (ref 10–20)
CALCIUM SERPL-MCNC: 9.2 MG/DL — SIGNIFICANT CHANGE UP (ref 8.4–10.5)
CHLORIDE SERPL-SCNC: 104 MMOL/L — SIGNIFICANT CHANGE UP (ref 98–110)
CO2 SERPL-SCNC: 22 MMOL/L — SIGNIFICANT CHANGE UP (ref 17–32)
CREAT SERPL-MCNC: 1.1 MG/DL — SIGNIFICANT CHANGE UP (ref 0.7–1.5)
EGFR: 49 ML/MIN/1.73M2 — LOW
EOSINOPHIL # BLD AUTO: 0.05 K/UL — SIGNIFICANT CHANGE UP (ref 0–0.7)
EOSINOPHIL NFR BLD AUTO: 0.7 % — SIGNIFICANT CHANGE UP (ref 0–8)
GLUCOSE SERPL-MCNC: 123 MG/DL — HIGH (ref 70–99)
HCT VFR BLD CALC: 40.3 % — SIGNIFICANT CHANGE UP (ref 37–47)
HGB BLD-MCNC: 12.7 G/DL — SIGNIFICANT CHANGE UP (ref 12–16)
IMM GRANULOCYTES NFR BLD AUTO: 0.4 % — HIGH (ref 0.1–0.3)
LYMPHOCYTES # BLD AUTO: 0.67 K/UL — LOW (ref 1.2–3.4)
LYMPHOCYTES # BLD AUTO: 8.9 % — LOW (ref 20.5–51.1)
MAGNESIUM SERPL-MCNC: 1.9 MG/DL — SIGNIFICANT CHANGE UP (ref 1.8–2.4)
MCHC RBC-ENTMCNC: 28.9 PG — SIGNIFICANT CHANGE UP (ref 27–31)
MCHC RBC-ENTMCNC: 31.5 G/DL — LOW (ref 32–37)
MCV RBC AUTO: 91.6 FL — SIGNIFICANT CHANGE UP (ref 81–99)
MONOCYTES # BLD AUTO: 0.7 K/UL — HIGH (ref 0.1–0.6)
MONOCYTES NFR BLD AUTO: 9.3 % — SIGNIFICANT CHANGE UP (ref 1.7–9.3)
NEUTROPHILS # BLD AUTO: 6.06 K/UL — SIGNIFICANT CHANGE UP (ref 1.4–6.5)
NEUTROPHILS NFR BLD AUTO: 80.4 % — HIGH (ref 42.2–75.2)
NRBC # BLD: 0 /100 WBCS — SIGNIFICANT CHANGE UP (ref 0–0)
NT-PROBNP SERPL-SCNC: 1283 PG/ML — HIGH (ref 0–300)
PLATELET # BLD AUTO: 126 K/UL — LOW (ref 130–400)
POTASSIUM SERPL-MCNC: 4.9 MMOL/L — SIGNIFICANT CHANGE UP (ref 3.5–5)
POTASSIUM SERPL-SCNC: 4.9 MMOL/L — SIGNIFICANT CHANGE UP (ref 3.5–5)
PROT SERPL-MCNC: 6.9 G/DL — SIGNIFICANT CHANGE UP (ref 6–8)
RBC # BLD: 4.4 M/UL — SIGNIFICANT CHANGE UP (ref 4.2–5.4)
RBC # FLD: 15.2 % — HIGH (ref 11.5–14.5)
SODIUM SERPL-SCNC: 141 MMOL/L — SIGNIFICANT CHANGE UP (ref 135–146)
TROPONIN T SERPL-MCNC: <0.01 NG/ML — SIGNIFICANT CHANGE UP
WBC # BLD: 7.53 K/UL — SIGNIFICANT CHANGE UP (ref 4.8–10.8)
WBC # FLD AUTO: 7.53 K/UL — SIGNIFICANT CHANGE UP (ref 4.8–10.8)

## 2022-12-22 PROCEDURE — 99285 EMERGENCY DEPT VISIT HI MDM: CPT | Mod: 25,GC

## 2022-12-22 PROCEDURE — 93010 ELECTROCARDIOGRAM REPORT: CPT

## 2022-12-22 PROCEDURE — 71045 X-RAY EXAM CHEST 1 VIEW: CPT | Mod: 26

## 2022-12-22 PROCEDURE — 93308 TTE F-UP OR LMTD: CPT | Mod: 26,GC

## 2022-12-22 PROCEDURE — 71275 CT ANGIOGRAPHY CHEST: CPT | Mod: 26,MA

## 2022-12-22 PROCEDURE — 74174 CTA ABD&PLVS W/CONTRAST: CPT | Mod: 26,MA

## 2022-12-22 RX ORDER — SODIUM CHLORIDE 9 MG/ML
1000 INJECTION, SOLUTION INTRAVENOUS ONCE
Refills: 0 | Status: COMPLETED | OUTPATIENT
Start: 2022-12-22 | End: 2022-12-22

## 2022-12-22 RX ADMIN — SODIUM CHLORIDE 1000 MILLILITER(S): 9 INJECTION, SOLUTION INTRAVENOUS at 22:37

## 2022-12-22 NOTE — ED ADULT NURSE NOTE - OBJECTIVE STATEMENT
88 y/o female, AxOx3, BIBEMS from [facility] for evaluation of hypotension and tachycardia. Pt c/o chronic back pain. Burow's Graft Text: The defect edges were debeveled with a #15 scalpel blade.  Given the location of the defect, shape of the defect, the proximity to free margins and the presence of a standing cone deformity a Burow's skin graft was deemed most appropriate. The standing cone was removed and this tissue was then trimmed to the shape of the primary defect. The adipose tissue was also removed until only dermis and epidermis were left.  The skin margins of the secondary defect were undermined to an appropriate distance in all directions utilizing iris scissors.  The secondary defect was closed with interrupted buried subcutaneous sutures.  The skin edges were then re-apposed with running  sutures.  The skin graft was then placed in the primary defect and oriented appropriately.

## 2022-12-22 NOTE — ED PROVIDER NOTE - CLINICAL SUMMARY MEDICAL DECISION MAKING FREE TEXT BOX
In ED triage, vitals showed BP 70s/50s and tachycardia in the 130s. EKG showing SVT.  Given on liter LR bolus, vitals recovered afterwards  CT angio chest and abdomen done and Since 9/13/2022: 1. No significant interval change. 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 No evidence of aortic rupture. 2. Unchanged moderate pericardial effusion. Unchanged moderate left, small right bilateral pleural effusions. 3. No evidence of acute intra-abdominal pathology.  Patient observed in the ED and VS remained stable. Will admit to Telemetry for further work up and monitoring.

## 2022-12-22 NOTE — ED PROVIDER NOTE - PROGRESS NOTE DETAILS
Matthew: POCUS significant for large pericardial effusion and AAA. Pt hypotensive 80s/50s, currently tachycardic 140s. on room air.   Pt taken to CT at this time.  - Received signout from MD Castro.  Patient reassessed.  Appearing and feeling well.  Erythema on back is chronic, patient was using heating pad on back. Likely not cellulitis.  Blood pressure and heart rate improved.  S/p IVF.  Repeat EKG NSR.  Patient asymptomatic.  No chest pain or shortness of breath.  CT unchanged from previous.  Will admit to telemetry.  Signed out to MAR.  - Received signout from MD Castro.  Patient reassessed.  Appearing and feeling well.  Erythema on back is chronic, patient was using heating pad on back. Likely not cellulitis.  Blood pressure and heart rate improved.  S/p IVF.  Repeat EKG NSR.  Patient asymptomatic.  No chest pain or shortness of breath.  CT unchanged from previous.  Will admit to telemetry.  Signed out to KINGSLEY Sr

## 2022-12-22 NOTE — ED ADULT TRIAGE NOTE - PRO INTERPRETER NEED 2
EMERGENCY DEPARTMENT HISTORY AND PHYSICAL EXAM      Date: 11/23/2021  Patient Name: Thierry Mock    History of Presenting Illness     Chief Complaint   Patient presents with    Shortness of Breath       History (Context): Thierry Mock is a 72 y.o. male with a history of polysubstance abuse, smokes crack cocaine, as well as history of COPD, DVT approximately 10 years ago, anxiety, alcohol abuse, NEIL on CPAP, mild diastolic heart dysfunction, hypertension, hyperlipidemia who presents with shortness of breath, increased cough with clear sputum, dyspnea on exertion. Started yesterday, he has been using his inhaler, approximately 10 times with no significant improvement. This morning he woke up and worsening shortness of breath. He arrived by EMS, in route to establish IV access given 125 mg of Solu-Medrol, 2 duo nebs. He denies any chest pain, nausea, vomiting, fever. Patient was on nonrebreather on oral arrival, was hypoxemic on scene. On review of systems, the patient denies chest pain, back pain, leg swelling, leg pain, recent travel, fever, chills, trauma, back pain, abdominal pain, nausea, vomiting, diaphoresis. PCP: Micah, MD Keisha    Current Facility-Administered Medications   Medication Dose Route Frequency Provider Last Rate Last Admin    albuterol-ipratropium (DUO-NEB) 2.5 MG-0.5 MG/3 ML  3 mL Nebulization NOW Cirilo Mo MD        magnesium sulfate 1 g/100 ml IVPB (premix or compounded)  1 g IntraVENous Q1H Cirilo Mo  mL/hr at 11/23/21 0701 1 g at 11/23/21 0701     Current Outpatient Medications   Medication Sig Dispense Refill    azithromycin (Zithromax TRI-JOSE) 500 mg tab Take 1 Tablet by mouth daily. 3 Tablet 0    DULoxetine (CYMBALTA) 20 mg capsule 20 mg.      therapeutic multivitamin-minerals (THERAGRAN-M) tablet Take 1 Tab by Mouth Once a Day.  traMADol (ULTRAM) 50 mg tablet 50 mg.      ARIPiprazole (ABILIFY) 5 mg tablet Take 1 Tab by mouth daily. Indications: DEPRESSION TREATMENT ADJUNCT 30 Tab 1    dabigatran etexilate (PRADAXA) 150 mg capsule Take 1 Cap by mouth every twelve (12) hours. Indications: PREVENT THROMBOEMBOLISM IN CHRONIC ATRIAL FIBRILLATION, PREVENTION OF DEEP VEIN THROMBOSIS RECURRENCE 60 Cap 1    albuterol (PROVENTIL HFA, VENTOLIN HFA, PROAIR HFA) 90 mcg/actuation inhaler Take 2 Puffs by inhalation every four (4) hours as needed for Wheezing or Shortness of Breath (Cough). 1 Inhaler 0    inhalational spacing device ALWAYS USE WITH INHALER 1 Device 0    SPIRIVA WITH HANDIHALER 18 mcg inhalation capsule Take 1 Cap by inhalation daily. 2/1/17 Dr. Aaron Hobson omeprazole (PRILOSEC) 20 mg capsule Take 1 Cap by mouth two (2) times a day. 2/1/17, QTY#60, 30 Days. 2/1/17 Dr. Aaron Hobson STOOL SOFTENER 100 mg capsule Take 1 Cap by mouth daily. 2/1/17, QTY#100 Caps, 30 Days. Dr. Aaron Hobson budesonide-formoterol [facility]) 160-4.5 mcg/actuation HFA inhaler Take 2 Puffs by inhalation two (2) times a day.  dabigatran etexilate (PRADAXA) 150 mg capsule Take 150 mg by mouth every twelve (12) hours.  albuterol-ipratropium (DUONEB) 2.5 mg-0.5 mg/3 ml nebu 3 mL by Nebulization route every six (6) hours as needed. Past History     Past Medical History:  Past Medical History:   Diagnosis Date    Alcohol abuse     Anxiety     Asthma     Bronchitis     Chronic deep vein thrombosis (DVT) ([facility]) 07/2013 07/2013, 12/2014 ([facility])    Cocaine abuse ([facility] [address]     COPD     Depression     GERD (gastroesophageal reflux disease)     Hyperlipidemia     Hypertension     Major depression     Mild diastolic dysfunction     NEIL on CPAP     Renal insufficiency     Suicidal ideation     Vitamin D deficiency        Past Surgical History:  Past Surgical History:   Procedure Laterality Date    HX HIP REPLACEMENT  08/2010    HX OTHER SURGICAL  06/2018    ENDOVASCULAR ANEURYSM REPAIR       Family History:  History reviewed.  No pertinent family history. Social History:  Social History     Tobacco Use    Smoking status: Former Smoker    Smokeless tobacco: Never Used    Tobacco comment: States that he quit 12 years ago. Substance Use Topics    Alcohol use: Yes    Drug use: Yes     Types: Cocaine       Allergies: Allergies   Allergen Reactions    Shellfish Derived Anaphylaxis       PMH, PSH, family history, social history, allergies reviewed with the patient with significant items noted above. Review of Systems   As per HPI, otherwise reviewed and negative. Physical Exam     Vitals:    11/23/21 0608 11/23/21 0633 11/23/21 0636   BP: (!) 146/90     Pulse: 89     Resp: 28     Temp: 98.8 °F (37.1 °C)     SpO2: 94% 92% 96%   Weight: 97.5 kg (215 lb)     Height: 6' 1\" (1.854 m)         Gen: In moderate respiratory distress  HEENT: Normocephalic, sclera anicteric  Cardiovascular: Normal rate, regular rhythm, no murmurs, rubs, gallops. Pulses intact and equal distally. Pulmonary: moderate respiratory distress. Tachypneic. No stridor. Wheezing noted in all fields. ABD: Soft, nontender, nondistended. Neuro: Alert. Normal speech. Normal mentation. Psych: Normal thought content and thought processes. : No CVA tenderness  EXT: no Edema. Moves all extremities well. No cyanosis  Skin: Warm and well-perfused.           Diagnostic Study Results     Labs -     Recent Results (from the past 12 hour(s))   CBC WITH AUTOMATED DIFF    Collection Time: 11/23/21  6:10 AM   Result Value Ref Range    WBC 7.4 4.6 - 13.2 K/uL    RBC 4.58 4.35 - 5.65 M/uL    HGB 13.5 13.0 - 16.0 g/dL    HCT 43.0 36.0 - 48.0 %    MCV 93.9 78.0 - 100.0 FL    MCH 29.5 24.0 - 34.0 PG    MCHC 31.4 31.0 - 37.0 g/dL    RDW 15.2 (H) 11.6 - 14.5 %    PLATELET 264 681 - 332 K/uL    MPV 9.5 9.2 - 11.8 FL    NRBC 0.0 0  WBC    ABSOLUTE NRBC 0.00 0.00 - 0.01 K/uL    NEUTROPHILS 39 (L) 40 - 73 %    LYMPHOCYTES 37 21 - 52 %    MONOCYTES 9 3 - 10 %    EOSINOPHILS 15 (H) 0 - 5 % BASOPHILS 0 0 - 2 %    IMMATURE GRANULOCYTES 0 0.0 - 0.5 %    ABS. NEUTROPHILS 2.9 1.8 - 8.0 K/UL    ABS. LYMPHOCYTES 2.7 0.9 - 3.6 K/UL    ABS. MONOCYTES 0.7 0.05 - 1.2 K/UL    ABS. EOSINOPHILS 1.1 (H) 0.0 - 0.4 K/UL    ABS. BASOPHILS 0.0 0.0 - 0.1 K/UL    ABS. IMM.  GRANS. 0.0 0.00 - 0.04 K/UL    DF AUTOMATED      RBC COMMENTS NORMOCYTIC, NORMOCHROMIC     METABOLIC PANEL, BASIC    Collection Time: 11/23/21  6:10 AM   Result Value Ref Range    Sodium 141 136 - 145 mmol/L    Potassium 3.8 3.5 - 5.5 mmol/L    Chloride 109 100 - 111 mmol/L    CO2 27 21 - 32 mmol/L    Anion gap 5 3.0 - 18 mmol/L    Glucose 109 (H) 74 - 99 mg/dL    BUN 9 7.0 - 18 MG/DL    Creatinine 1.14 0.6 - 1.3 MG/DL    BUN/Creatinine ratio 8 (L) 12 - 20      GFR est AA >60 >60 ml/min/1.73m2    GFR est non-AA >60 >60 ml/min/1.73m2    Calcium 8.6 8.5 - 10.1 MG/DL   CARDIAC PANEL,(CK, CKMB & TROPONIN)    Collection Time: 11/23/21  6:10 AM   Result Value Ref Range    CK - MB <1.0 <3.6 ng/ml    CK-MB Index  0.0 - 4.0 %     CALCULATION NOT PERFORMED WHEN RESULT IS BELOW LINEAR LIMIT    CK 84 39 - 308 U/L    Troponin-I, QT 0.02 0.0 - 0.045 NG/ML   EKG, 12 LEAD, INITIAL    Collection Time: 11/23/21  6:20 AM   Result Value Ref Range    Ventricular Rate 88 BPM    Atrial Rate 88 BPM    P-R Interval 136 ms    QRS Duration 82 ms    Q-T Interval 360 ms    QTC Calculation (Bezet) 435 ms    Calculated P Axis 84 degrees    Calculated R Axis 43 degrees    Calculated T Axis 76 degrees    Diagnosis       Normal sinus rhythm  Normal ECG  When compared with ECG of 02-NOV-2021 04:06,  fusion complexes are no longer present  premature supraventricular complexes are no longer present  Confirmed by Eben Mercer MD. ([id number]) on 11/23/2021 7:05:57 AM     BLOOD GAS, ARTERIAL POC    Collection Time: 11/23/21  6:22 AM   Result Value Ref Range    Device: ROOM AIR      pH (POC) 7.36 7.35 - 7.45      pCO2 (POC) 43.4 35.0 - 45.0 MMHG    pO2 (POC) 58 (L) 80 - 100 MMHG    HCO3 (POC) 24.2 22 - 26 MMOL/L    sO2 (POC) 88.0 (L) 92 - 97 %    Base deficit (POC) 1.4 mmol/L    Set Rate 28 bpm    Allens test (POC) Positive      Site RIGHT RADIAL      Patient temp. 98.8      Specimen type (POC) ARTERIAL      Performed by Julee Dunlap    COVID-19 RAPID TEST    Collection Time: 11/23/21  6:29 AM   Result Value Ref Range    Specimen source Nasopharyngeal      COVID-19 rapid test Not detected NOTD     POC LACTIC ACID    Collection Time: 11/23/21  6:55 AM   Result Value Ref Range    Lactic Acid (POC) 1.18 0.40 - 2.00 mmol/L       Radiologic Studies -   XR CHEST PORT    (Results Pending)     CT Results  (Last 48 hours)    None        CXR Results  (Last 48 hours)    None        Critical Care  Performed by: Genevieve Khan MD  Authorized by: Genevieve Khan MD     Critical care provider statement:     Critical care time (minutes):  45    Critical care was necessary to treat or prevent imminent or life-threatening deterioration of the following conditions:  Respiratory failure    Critical care was time spent personally by me on the following activities:  Ordering and review of radiographic studies, ordering and review of laboratory studies, pulse oximetry, re-evaluation of patient's condition, evaluation of patient's response to treatment, discussions with primary provider, development of treatment plan with patient or surrogate and examination of patient        Medical Decision Making   I am the first provider for this patient. I reviewed the vital signs, available nursing notes, past medical history, past surgical history, family history and social history. Vital Signs-Reviewed the patient's vital signs. EKG: Interpreted by myself.  EKG non diagnostic, without acute ischemic changes, arrhythmia, prolonged QT, or evidence of Brugada     Records Reviewed: Personally, on initial evaluation    MDM:   Patient presents with dyspnea, which is associated with increased sputum production, history of asthma, COPD. Last smoked cocaine, 2 nights ago. DDX considered: Pneumothorax, pneumonia, COPD exacerbation, CHF, ACS, anxiety, PE  DDX thought to be less likely but also considered due to high risk condition: Anxiety, aortic dissection, PE, Boerhaave's, pericarditis, mediastinitis    Patient condition on initial evaluation: critically ill    Plan:   Oxygen therapy per protocol  Close Observation    Cardiac monitoring  As per orders noted below:  Orders Placed This Encounter    COVID-19 RAPID TEST    XR CHEST PORT    CBC WITH AUTOMATED DIFF    BASIC METABOLIC PANEL    CARDIAC PANEL,(CK, CKMB & TROPONIN)    POC LACTIC ACID    BLOOD GAS, ARTERIAL POC    POC LACTIC ACID    EKG, 12 LEAD, INITIAL    DISCONTD: magnesium sulfate injection 2 g    albuterol-ipratropium (DUO-NEB) 2.5 MG-0.5 MG/3 ML    doxycycline (VIBRAMYCIN) 100 mg in 0.9% sodium chloride (MBP/ADV) 100 mL MBP    magnesium sulfate 1 gram/100 mL IVPB premix pgbk    DISCONTD: magnesium sulfate injection 2 g    magnesium sulfate 1 g/100 ml IVPB (premix or compounded)    IP CONSULT TO HOSPITALIST          ED Course:   ED Course as of 11/23/21 0800   Tue Nov 23, 2021   0620 2 g mag, serial nebulizers, first 1 should be a DuoNeb. Get an ABG. Lactate, give a dose of doxycycline 100. [DM]   0972 At this time patient feels comfortable not wearing BiPAP. [DM]   5606 pO2 (POC)(! ): 58  Hypoxemic, placed on HFNC, washout of CO2 [DM]   0658 CBC without leukocytosis or anemia  Cardiac enzymes negative. [DM]      ED Course User Index  [DM] Noam Dillard MD      Vitals Review/addressed -     Diagnostic Study Results     Orders Placed This Encounter    COVID-19 RAPID TEST     Standing Status:   Standing     Number of Occurrences:   1     Order Specific Question:   Is this test for diagnosis or screening? Answer:   Diagnosis of ill patient     Order Specific Question:   Symptomatic for COVID-19 as defined by CDC?      Answer:   Yes     Order Specific Question:   Date of Symptom Onset     Answer:   11/21/2021     Order Specific Question:   Hospitalized for COVID-19? Answer:   No     Order Specific Question:   Admitted to ICU for COVID-19? Answer:   No     Order Specific Question:   Employed in healthcare setting? Answer:   No     Order Specific Question:   Resident in a congregate (group) care setting? Answer:   No     Order Specific Question:   Previously tested for COVID-19? Answer: Yes    XR CHEST PORT     Standing Status:   Standing     Number of Occurrences:   1     Order Specific Question:   Reason for Exam     Answer:   chest pain, sob, and/or arrhythmia    CBC WITH AUTOMATED DIFF     Standing Status:   Standing     Number of Occurrences:   1    BASIC METABOLIC PANEL     Standing Status:   Standing     Number of Occurrences:   1    CARDIAC PANEL,(CK, CKMB & TROPONIN)     Standing Status:   Standing     Number of Occurrences:   1    POC LACTIC ACID     Standing Status:   Standing     Number of Occurrences:   1    BLOOD GAS, ARTERIAL POC     Standing Status:   Standing     Number of Occurrences:   1    POC LACTIC ACID     Standing Status:   Standing     Number of Occurrences:   1    EKG, 12 LEAD, INITIAL     Standing Status:   Standing     Number of Occurrences:   1     Order Specific Question:   Reason for Exam:     Answer:   sob    DISCONTD: magnesium sulfate injection 2 g    albuterol-ipratropium (DUO-NEB) 2.5 MG-0.5 MG/3 ML     Order Specific Question:   MODE OF DELIVERY     Answer:   Nebulizer     Order Specific Question:   Initiate RT Bronchodilator Protocol     Answer:    Yes    doxycycline (VIBRAMYCIN) 100 mg in 0.9% sodium chloride (MBP/ADV) 100 mL MBP     Order Specific Question:   Antibiotic Indications     Answer:   COPD Exacerbation    magnesium sulfate 1 gram/100 mL IVPB premix chris Ruiz Jose Antonio: cabinet override    DISCONTD: magnesium sulfate injection 2 g    magnesium sulfate 1 g/100 ml IVPB (premix or compounded)    IP CONSULT TO HOSPITALIST     Standing Status:   Standing     Number of Occurrences:   1     Order Specific Question:   Reason for Consult: Answer:   TO ADMIT     Order Specific Question:   Did you call or speak to the consulting provider? Answer:   No     Order Specific Question:   Consult To     Answer:   hospitalist     Order Specific Question:   Schedule When? Answer:   TODAY       Labs -     Recent Results (from the past 12 hour(s))   CBC WITH AUTOMATED DIFF    Collection Time: 11/23/21  6:10 AM   Result Value Ref Range    WBC 7.4 4.6 - 13.2 K/uL    RBC 4.58 4.35 - 5.65 M/uL    HGB 13.5 13.0 - 16.0 g/dL    HCT 43.0 36.0 - 48.0 %    MCV 93.9 78.0 - 100.0 FL    MCH 29.5 24.0 - 34.0 PG    MCHC 31.4 31.0 - 37.0 g/dL    RDW 15.2 (H) 11.6 - 14.5 %    PLATELET 555 949 - 639 K/uL    MPV 9.5 9.2 - 11.8 FL    NRBC 0.0 0  WBC    ABSOLUTE NRBC 0.00 0.00 - 0.01 K/uL    NEUTROPHILS 39 (L) 40 - 73 %    LYMPHOCYTES 37 21 - 52 %    MONOCYTES 9 3 - 10 %    EOSINOPHILS 15 (H) 0 - 5 %    BASOPHILS 0 0 - 2 %    IMMATURE GRANULOCYTES 0 0.0 - 0.5 %    ABS. NEUTROPHILS 2.9 1.8 - 8.0 K/UL    ABS. LYMPHOCYTES 2.7 0.9 - 3.6 K/UL    ABS. MONOCYTES 0.7 0.05 - 1.2 K/UL    ABS. EOSINOPHILS 1.1 (H) 0.0 - 0.4 K/UL    ABS. BASOPHILS 0.0 0.0 - 0.1 K/UL    ABS. IMM.  GRANS. 0.0 0.00 - 0.04 K/UL    DF AUTOMATED      RBC COMMENTS NORMOCYTIC, NORMOCHROMIC     METABOLIC PANEL, BASIC    Collection Time: 11/23/21  6:10 AM   Result Value Ref Range    Sodium 141 136 - 145 mmol/L    Potassium 3.8 3.5 - 5.5 mmol/L    Chloride 109 100 - 111 mmol/L    CO2 27 21 - 32 mmol/L    Anion gap 5 3.0 - 18 mmol/L    Glucose 109 (H) 74 - 99 mg/dL    BUN 9 7.0 - 18 MG/DL    Creatinine 1.14 0.6 - 1.3 MG/DL    BUN/Creatinine ratio 8 (L) 12 - 20      GFR est AA >60 >60 ml/min/1.73m2    GFR est non-AA >60 >60 ml/min/1.73m2    Calcium 8.6 8.5 - 10.1 MG/DL   CARDIAC PANEL,(CK, CKMB & TROPONIN)    Collection Time: 11/23/21  6:10 AM Result Value Ref Range    CK - MB <1.0 <3.6 ng/ml    CK-MB Index  0.0 - 4.0 %     CALCULATION NOT PERFORMED WHEN RESULT IS BELOW LINEAR LIMIT    CK 84 39 - 308 U/L    Troponin-I, QT 0.02 0.0 - 0.045 NG/ML   EKG, 12 LEAD, INITIAL    Collection Time: 11/23/21  6:20 AM   Result Value Ref Range    Ventricular Rate 88 BPM    Atrial Rate 88 BPM    P-R Interval 136 ms    QRS Duration 82 ms    Q-T Interval 360 ms    QTC Calculation (Bezet) 435 ms    Calculated P Axis 84 degrees    Calculated R Axis 43 degrees    Calculated T Axis 76 degrees    Diagnosis       Normal sinus rhythm  Normal ECG  When compared with ECG of 02-NOV-2021 04:06,  fusion complexes are no longer present  premature supraventricular complexes are no longer present  Confirmed by Oh Cintron MD. ([id number]) on 11/23/2021 7:05:57 AM     BLOOD GAS, ARTERIAL POC    Collection Time: 11/23/21  6:22 AM   Result Value Ref Range    Device: ROOM AIR      pH (POC) 7.36 7.35 - 7.45      pCO2 (POC) 43.4 35.0 - 45.0 MMHG    pO2 (POC) 58 (L) 80 - 100 MMHG    HCO3 (POC) 24.2 22 - 26 MMOL/L    sO2 (POC) 88.0 (L) 92 - 97 %    Base deficit (POC) 1.4 mmol/L    Set Rate 28 bpm    Allens test (POC) Positive      Site RIGHT RADIAL      Patient temp. 98.8      Specimen type (POC) ARTERIAL      Performed by Gamaliel Tobin    COVID-19 RAPID TEST    Collection Time: 11/23/21  6:29 AM   Result Value Ref Range    Specimen source Nasopharyngeal      COVID-19 rapid test Not detected NOTD     POC LACTIC ACID    Collection Time: 11/23/21  6:55 AM   Result Value Ref Range    Lactic Acid (POC) 1.18 0.40 - 2.00 mmol/L       Radiologic Studies -   XR CHEST PORT    (Results Pending)     CT Results  (Last 48 hours)    None        CXR Results  (Last 48 hours)    None          7:58 AM  Will plan to admit for hypoxemia, copd exacerbation. The patient understands and agrees with the plan. Spoke with Dr. Kira Araujo the on call admitting clinician who agrees to admit patient.    Appreciate the assistance in the care of this patient. Disposition     Disposition:  Admit    CLINICAL IMPRESSION:    1. Acute hypoxemic respiratory failure (HCC)    2. COPD exacerbation ([address]    3. Obstructive sleep apnea syndrome    4. Cocaine abuse [facility])        It should be noted that I will be the provider of record for this patient  Dandre Zambrano MD    Follow-up Information    None         Current Discharge Medication List          Please note that this dictation was completed with [email or web address], the computer voice recognition software. Quite often unanticipated grammatical, syntax, homophones, and other interpretive errors are inadvertently transcribed by the computer software. Please disregard these errors. Please excuse any errors that have escaped final proofreading. English

## 2022-12-22 NOTE — ED ADULT NURSE NOTE - NSIMPLEMENTINTERV_GEN_ALL_ED
Implemented All Fall with Harm Risk Interventions:  [place] to call system. Call bell, personal items and telephone within reach. Instruct patient to call for assistance. Room bathroom lighting operational. Non-slip footwear when patient is off stretcher. Physically safe environment: no spills, clutter or unnecessary equipment. Stretcher in lowest position, wheels locked, appropriate side rails in place. Provide visual cue, wrist band, yellow gown, etc. Monitor gait and stability. Monitor for mental status changes and reorient to person, place, and time. Review medications for side effects contributing to fall risk. Reinforce activity limits and safety measures with patient and family. Provide visual clues: red socks.

## 2022-12-22 NOTE — ED PROVIDER NOTE - PHYSICAL EXAMINATION
VITAL SIGNS: I have reviewed nursing notes and confirm.  CONSTITUTIONAL: pleasant elderly female in stretcher in NAD  SKIN: Warm dry, normal skin turgor  HEAD: NCAT  EYES: EOMI, PERRL, no scleral icterus  ENT: Moist mucous membranes, normal pharynx with no erythema or exudates.  NECK: Supple; non tender. Full ROM. No cervical LAD.  no appreciable JVD.  CARD: tachycardic 130s-140s on monitor. S1/S2. +peripheral pulses intact in all extremities.  RESP: clear to ausculation b/l.  No rales, rhonchi, or wheezing. no respiratory distress, speaking sentences on room air.  ABD: soft, non-tender, non-distended, no rebound or guarding. No CVA tenderness  EXT: Full ROM, no bony tenderness, no pedal edema, no calf tenderness  NEURO: awake, alert, oriented. no focal deficits. sensation intact throughout.  PSYCH: Cooperative, appropriate.

## 2022-12-22 NOTE — ED PROVIDER NOTE - ATTENDING CONTRIBUTION TO CARE
I personally evaluated patient. I agree with the findings and plan with all documentation on chart except as documented  in my note.    87-year-old female past medical history of thoracic and abdominal aortic aneurysm, history of moderate pericardial effusion, hypertension who presents from [facility] for tachycardia and hypotension.  Patient was pale appearing and was sent to the emergency department for evaluation.  In triage, patient was hypotensive with a systolic blood pressure of 70s.  Patient was rushed to the critical care emergency department.  Patient is awake and alert and complains of upper middle back pain.  She reports that this pain started today.  She denies any chest pain, abdominal pain, shortness of breath, vomiting.  She denies any melena or blood in stool.    Patient seen and evaluated.  Large-bore IV placed and labs sent.  Patient started on IV fluid resuscitation.  EKG done and shows tachycardia to the 150s.  Bedside echo and AAA study performed.  Patient has an aortic aneurysm to 4.2 cm and a moderate pericardial effusion.  No clear signs of tamponade on echo.  Given upper back pain and hypotension, patient rushed for CT dissection study.  After CT scan, patient vital signs normalizing and patient continues to look and feel well.  CT scan shows no new findings.  We will continue to monitor and reassess and patient will require admission for further work-up and management.  Patient may have had an arrhythmia that caused hypotension that is now resolved, which could have been a flutter.  EKG is unclear of etiology and is being repeated.

## 2022-12-22 NOTE — ED PROVIDER NOTE - OBJECTIVE STATEMENT
87F PMHx descending aortic aneurysm presents to ED from [facility] for tachycardia and hypotension. 87F PMHx descending aortic aneurysm, hx of moderate pericardial effusion presents to ED from [facility] for tachycardia and hypotension and appearing pale as per NH paperwork. In triage pt noted to be hypotensive 70s/50s. Pt alert and oriented in ED. Pt reports mid-back pain that started earlier today. She denies all fall/trauma. Denies chest pain, shortness of breath, abd pain, nausea, vomiting, diarrhea, bloody/dark stools, fever/chills, URI sxs. States she has otherwise been feeling well.

## 2022-12-23 LAB
ANION GAP SERPL CALC-SCNC: 13 MMOL/L — SIGNIFICANT CHANGE UP (ref 7–14)
BASOPHILS # BLD AUTO: 0.04 K/UL — SIGNIFICANT CHANGE UP (ref 0–0.2)
BASOPHILS NFR BLD AUTO: 0.7 % — SIGNIFICANT CHANGE UP (ref 0–1)
BLD GP AB SCN SERPL QL: SIGNIFICANT CHANGE UP
BUN SERPL-MCNC: 18 MG/DL — SIGNIFICANT CHANGE UP (ref 10–20)
CALCIUM SERPL-MCNC: 8.5 MG/DL — SIGNIFICANT CHANGE UP (ref 8.4–10.5)
CHLORIDE SERPL-SCNC: 105 MMOL/L — SIGNIFICANT CHANGE UP (ref 98–110)
CO2 SERPL-SCNC: 21 MMOL/L — SIGNIFICANT CHANGE UP (ref 17–32)
CREAT SERPL-MCNC: 0.9 MG/DL — SIGNIFICANT CHANGE UP (ref 0.7–1.5)
EGFR: 63 ML/MIN/1.73M2 — SIGNIFICANT CHANGE UP
EOSINOPHIL # BLD AUTO: 0.13 K/UL — SIGNIFICANT CHANGE UP (ref 0–0.7)
EOSINOPHIL NFR BLD AUTO: 2.2 % — SIGNIFICANT CHANGE UP (ref 0–8)
GLUCOSE SERPL-MCNC: 106 MG/DL — HIGH (ref 70–99)
HCT VFR BLD CALC: 32.2 % — LOW (ref 37–47)
HGB BLD-MCNC: 10 G/DL — LOW (ref 12–16)
IMM GRANULOCYTES NFR BLD AUTO: 0.5 % — HIGH (ref 0.1–0.3)
LYMPHOCYTES # BLD AUTO: 0.65 K/UL — LOW (ref 1.2–3.4)
LYMPHOCYTES # BLD AUTO: 10.9 % — LOW (ref 20.5–51.1)
MAGNESIUM SERPL-MCNC: 1.8 MG/DL — SIGNIFICANT CHANGE UP (ref 1.8–2.4)
MCHC RBC-ENTMCNC: 28.8 PG — SIGNIFICANT CHANGE UP (ref 27–31)
MCHC RBC-ENTMCNC: 31.1 G/DL — LOW (ref 32–37)
MCV RBC AUTO: 92.8 FL — SIGNIFICANT CHANGE UP (ref 81–99)
MONOCYTES # BLD AUTO: 0.81 K/UL — HIGH (ref 0.1–0.6)
MONOCYTES NFR BLD AUTO: 13.6 % — HIGH (ref 1.7–9.3)
NEUTROPHILS # BLD AUTO: 4.28 K/UL — SIGNIFICANT CHANGE UP (ref 1.4–6.5)
NEUTROPHILS NFR BLD AUTO: 72.1 % — SIGNIFICANT CHANGE UP (ref 42.2–75.2)
NRBC # BLD: 0 /100 WBCS — SIGNIFICANT CHANGE UP (ref 0–0)
PLATELET # BLD AUTO: 129 K/UL — LOW (ref 130–400)
POTASSIUM SERPL-MCNC: 3.8 MMOL/L — SIGNIFICANT CHANGE UP (ref 3.5–5)
POTASSIUM SERPL-SCNC: 3.8 MMOL/L — SIGNIFICANT CHANGE UP (ref 3.5–5)
RBC # BLD: 3.47 M/UL — LOW (ref 4.2–5.4)
RBC # FLD: 15.5 % — HIGH (ref 11.5–14.5)
SARS-COV-2 RNA SPEC QL NAA+PROBE: SIGNIFICANT CHANGE UP
SODIUM SERPL-SCNC: 139 MMOL/L — SIGNIFICANT CHANGE UP (ref 135–146)
TROPONIN T SERPL-MCNC: <0.01 NG/ML — SIGNIFICANT CHANGE UP
TSH SERPL-MCNC: 3.4 UIU/ML — SIGNIFICANT CHANGE UP (ref 0.27–4.2)
WBC # BLD: 5.94 K/UL — SIGNIFICANT CHANGE UP (ref 4.8–10.8)
WBC # FLD AUTO: 5.94 K/UL — SIGNIFICANT CHANGE UP (ref 4.8–10.8)

## 2022-12-23 PROCEDURE — 99223 1ST HOSP IP/OBS HIGH 75: CPT

## 2022-12-23 PROCEDURE — 93010 ELECTROCARDIOGRAM REPORT: CPT | Mod: 76

## 2022-12-23 RX ORDER — HYDROCORTISONE 1 %
1 OINTMENT (GRAM) TOPICAL
Refills: 0 | Status: DISCONTINUED | OUTPATIENT
Start: 2022-12-23 | End: 2023-01-01

## 2022-12-23 RX ORDER — LANOLIN ALCOHOL/MO/W.PET/CERES
5 CREAM (GRAM) TOPICAL AT BEDTIME
Refills: 0 | Status: DISCONTINUED | OUTPATIENT
Start: 2022-12-23 | End: 2023-01-01

## 2022-12-23 RX ORDER — SODIUM CHLORIDE 9 MG/ML
500 INJECTION, SOLUTION INTRAVENOUS ONCE
Refills: 0 | Status: COMPLETED | OUTPATIENT
Start: 2022-12-23 | End: 2022-12-23

## 2022-12-23 RX ORDER — ATORVASTATIN CALCIUM 80 MG/1
20 TABLET, FILM COATED ORAL AT BEDTIME
Refills: 0 | Status: DISCONTINUED | OUTPATIENT
Start: 2022-12-23 | End: 2023-01-01

## 2022-12-23 RX ORDER — ENOXAPARIN SODIUM 100 MG/ML
40 INJECTION SUBCUTANEOUS EVERY 24 HOURS
Refills: 0 | Status: DISCONTINUED | OUTPATIENT
Start: 2022-12-23 | End: 2022-12-23

## 2022-12-23 RX ORDER — PANTOPRAZOLE SODIUM 20 MG/1
40 TABLET, DELAYED RELEASE ORAL
Refills: 0 | Status: DISCONTINUED | OUTPATIENT
Start: 2022-12-23 | End: 2023-01-01

## 2022-12-23 RX ORDER — ACETAMINOPHEN 500 MG
675 TABLET ORAL ONCE
Refills: 0 | Status: COMPLETED | OUTPATIENT
Start: 2022-12-23 | End: 2022-12-24

## 2022-12-23 RX ORDER — ENOXAPARIN SODIUM 100 MG/ML
20 INJECTION SUBCUTANEOUS EVERY 24 HOURS
Refills: 0 | Status: DISCONTINUED | OUTPATIENT
Start: 2022-12-23 | End: 2022-12-23

## 2022-12-23 RX ORDER — HEPARIN SODIUM 5000 [USP'U]/ML
5000 INJECTION INTRAVENOUS; SUBCUTANEOUS EVERY 12 HOURS
Refills: 0 | Status: DISCONTINUED | OUTPATIENT
Start: 2022-12-23 | End: 2023-01-01

## 2022-12-23 RX ORDER — LABETALOL HCL 100 MG
100 TABLET ORAL EVERY 12 HOURS
Refills: 0 | Status: DISCONTINUED | OUTPATIENT
Start: 2022-12-23 | End: 2022-12-24

## 2022-12-23 RX ORDER — PETROLATUM,WHITE
1 JELLY (GRAM) TOPICAL
Refills: 0 | Status: DISCONTINUED | OUTPATIENT
Start: 2022-12-23 | End: 2023-01-01

## 2022-12-23 RX ADMIN — Medication 5 MILLIGRAM(S): at 21:43

## 2022-12-23 RX ADMIN — Medication 100 MILLIGRAM(S): at 05:38

## 2022-12-23 RX ADMIN — ATORVASTATIN CALCIUM 20 MILLIGRAM(S): 80 TABLET, FILM COATED ORAL at 21:43

## 2022-12-23 RX ADMIN — Medication 1 APPLICATION(S): at 05:38

## 2022-12-23 RX ADMIN — Medication 1 APPLICATION(S): at 17:42

## 2022-12-23 RX ADMIN — SODIUM CHLORIDE 500 MILLILITER(S): 9 INJECTION, SOLUTION INTRAVENOUS at 12:44

## 2022-12-23 RX ADMIN — Medication 1 APPLICATION(S): at 05:43

## 2022-12-23 RX ADMIN — HEPARIN SODIUM 5000 UNIT(S): 5000 INJECTION INTRAVENOUS; SUBCUTANEOUS at 05:51

## 2022-12-23 RX ADMIN — HEPARIN SODIUM 5000 UNIT(S): 5000 INJECTION INTRAVENOUS; SUBCUTANEOUS at 17:41

## 2022-12-23 RX ADMIN — PANTOPRAZOLE SODIUM 40 MILLIGRAM(S): 20 TABLET, DELAYED RELEASE ORAL at 05:39

## 2022-12-23 NOTE — H&P ADULT - NSICDXPASTMEDICALHX_GEN_ALL_CORE_FT
PAST MEDICAL HISTORY:  Descending thoracic aortic aneurysm     Pericardial effusion     Pleural effusion

## 2022-12-23 NOTE — PROGRESS NOTE ADULT - SUBJECTIVE AND OBJECTIVE BOX
SULEMA POZO 87y Female  MRN#: [id number]     Hospital Day: 0    SUMMARY: 88 y/o female with PMHx of descending aortic aneurysm and moderate pericardial effusion, admitted from [facility] for tachycardia to 130s and hypotension to 77/57 due to SVT. BP stabilized s/p 1L LR. Currently on labetalol 100mg PO q12h. 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     SUBJECTIVE  No reported acute overnight events.                                             ----------------------------------------------------------  OBJECTIVE  PAST MEDICAL & SURGICAL HISTORY  Descending thoracic aortic aneurysm    Pericardial effusion    Pleural effusion                                              -----------------------------------------------------------  ALLERGIES:  Allergy Status Unknown                                            ------------------------------------------------------------    HOME MEDICATIONS  Home Medications:  labetalol 100 mg oral tablet: 1 tab(s) orally once a day (16 Sep 2022 19:21)  melatonin 5 mg oral tablet: 1 tab(s) orally once a day (at bedtime), As needed, Insomnia (16 Sep 2022 19:21)  pantoprazole 40 mg oral delayed release tablet: 1 tab(s) orally once a day (before a meal) (16 Sep 2022 19:21)                           MEDICATIONS:  STANDING MEDICATIONS  acetaminophen   IVPB .. 675 milliGRAM(s) IV Intermittent once  atorvastatin 20 milliGRAM(s) Oral at bedtime  heparin   Injectable 5000 Unit(s) SubCutaneous every 12 hours  hydrocortisone 1% Cream 1 Application(s) Topical two times a day  labetalol 100 milliGRAM(s) Oral every 12 hours  melatonin 5 milliGRAM(s) Oral at bedtime  pantoprazole    Tablet 40 milliGRAM(s) Oral before breakfast  petrolatum white Ointment 1 Application(s) Topical two times a day    PRN MEDICATIONS                                            ------------------------------------------------------------  VITAL SIGNS: Last 24 Hours  T(C): 36.1 (23 Dec 2022 05:33), Max: 36.5 (22 Dec 2022 22:04)  T(F): 96.9 (23 Dec 2022 05:33), Max: 97.7 (22 Dec 2022 22:04)  HR: 91 (23 Dec 2022 05:33) (69 - 150)  BP: 125/83 (23 Dec 2022 05:33) (77/57 - 135/79)  BP(mean): --  RR: 18 (23 Dec 2022 05:33) (18 - 20)  SpO2: 94% (23 Dec 2022 05:33) (94% - 99%)      12-22-22 @ 07:01  -  12-23-22 @ 07:00  --------------------------------------------------------  IN: 0 mL / OUT: 500 mL / NET: -500 mL    12-23-22 @ 07:01  -  12-23-22 @ 11:07  --------------------------------------------------------  IN: 240 mL / OUT: 0 mL / NET: 240 mL                                             --------------------------------------------------------------  LABS:                        12.7   7.53  )-----------( 126      ( 22 Dec 2022 22:58 )             40.3     12-22    141  |  104  |  23<H>  ----------------------------<  123<H>  4.9   |  22  |  1.1    Ca    9.2      22 Dec 2022 22:58  Mg     1.9     12-22    TPro  6.9  /  Alb  4.1  /  TBili  0.4  /  DBili  x   /  AST  28  /  ALT  21  /  AlkPhos  161<H>  12-22          Troponin T, Serum: <0.01 ng/mL (12-22-22 @ 22:58)          CARDIAC MARKERS ( 22 Dec 2022 22:58 )  x     / <0.01 ng/mL / x     / x     / x                                                  -------------------------------------------------------------  RADIOLOGY:                                            --------------------------------------------------------------    PHYSICAL EXAM:  General:   HEENT:  LUNGS:  HEART:  ABDOMEN:  EXT:  NEURO:  SKIN:                                           --------------------------------------------------------------       SULEMA POZO 87y Female  MRN#: [id number]     Hospital Day: 0    SUMMARY: 88 y/o female with PMHx of descending aortic aneurysm and moderate pericardial effusion, admitted from [facility] for tachycardia to 130s and hypotension to 77/57 due to SVT. BP stabilized s/p 1L LR. Currently on labetalol 100mg PO q12h. 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     SUBJECTIVE  No reported acute overnight events. Awake in bed, eating breakfast. Conversant. In no visible distress. Denies overnight issues.                                            ----------------------------------------------------------  OBJECTIVE  PAST MEDICAL & SURGICAL HISTORY  Descending thoracic aortic aneurysm    Pericardial effusion    Pleural effusion                                              -----------------------------------------------------------  ALLERGIES:  Allergy Status Unknown                                            ------------------------------------------------------------    HOME MEDICATIONS  Home Medications:  labetalol 100 mg oral tablet: 1 tab(s) orally once a day (16 Sep 2022 19:21)  melatonin 5 mg oral tablet: 1 tab(s) orally once a day (at bedtime), As needed, Insomnia (16 Sep 2022 19:21)  pantoprazole 40 mg oral delayed release tablet: 1 tab(s) orally once a day (before a meal) (16 Sep 2022 19:21)                           MEDICATIONS:  STANDING MEDICATIONS  acetaminophen   IVPB .. 675 milliGRAM(s) IV Intermittent once  atorvastatin 20 milliGRAM(s) Oral at bedtime  heparin   Injectable 5000 Unit(s) SubCutaneous every 12 hours  hydrocortisone 1% Cream 1 Application(s) Topical two times a day  labetalol 100 milliGRAM(s) Oral every 12 hours  melatonin 5 milliGRAM(s) Oral at bedtime  pantoprazole    Tablet 40 milliGRAM(s) Oral before breakfast  petrolatum white Ointment 1 Application(s) Topical two times a day    PRN MEDICATIONS                                            ------------------------------------------------------------  VITAL SIGNS: Last 24 Hours  T(C): 36.1 (23 Dec 2022 05:33), Max: 36.5 (22 Dec 2022 22:04)  T(F): 96.9 (23 Dec 2022 05:33), Max: 97.7 (22 Dec 2022 22:04)  HR: 91 (23 Dec 2022 05:33) (69 - 150)  BP: 125/83 (23 Dec 2022 05:33) (77/57 - 135/79)  BP(mean): --  RR: 18 (23 Dec 2022 05:33) (18 - 20)  SpO2: 94% (23 Dec 2022 05:33) (94% - 99%)      12-22-22 @ 07:01  -  12-23-22 @ 07:00  --------------------------------------------------------  IN: 0 mL / OUT: 500 mL / NET: -500 mL    12-23-22 @ 07:01  -  12-23-22 @ 11:07  --------------------------------------------------------  IN: 240 mL / OUT: 0 mL / NET: 240 mL                                             --------------------------------------------------------------  LABS:                        12.7   7.53  )-----------( 126      ( 22 Dec 2022 22:58 )             40.3     12-22    141  |  104  |  23<H>  ----------------------------<  123<H>  4.9   |  22  |  1.1    Ca    9.2      22 Dec 2022 22:58  Mg     1.9     12-22    TPro  6.9  /  Alb  4.1  /  TBili  0.4  /  DBili  x   /  AST  28  /  ALT  21  /  AlkPhos  161<H>  12-22          Troponin T, Serum: <0.01 ng/mL (12-22-22 @ 22:58)          CARDIAC MARKERS ( 22 Dec 2022 22:58 )  x     / <0.01 ng/mL / x     / x     / x                                                  -------------------------------------------------------------  RADIOLOGY:  ACC: [id number] EXAM: CT ANGIO ABD PELV (W)AW   ACC: [id number] EXAM: CT ANGIO CHEST AORTA [facility]  PROCEDURE DATE: 12/22/2022  IMPRESSION:  Since 9/13/2022:  1. No significant interval change. 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 No evidence of aortic rupture.  2. Unchanged moderate pericardial effusion. Unchanged moderate left, small right bilateral pleural effusions.  3. No evidence of acute intra-abdominal pathology.  YAIR HIGGINS MD; Attending Radiologist  This document has been electronically signed. Dec 22 2022 11:59PM                                            --------------------------------------------------------------    PHYSICAL EXAM:  General:   HEENT:  LUNGS:  HEART:  ABDOMEN:  EXT:  NEURO:  SKIN:                                           --------------------------------------------------------------       SULEMA POZO 87y Female  MRN#: [id number]     Hospital Day: 0    SUMMARY: 86 y/o female with PMHx of descending aortic aneurysm and moderate pericardial effusion, admitted from [facility] for tachycardia to 130s and hypotension to 77/57 due to SVT. BP stabilized s/p 1L LR. Currently on labetalol 100mg PO q12h. 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     SUBJECTIVE  No reported acute overnight events. Awake in bed, eating breakfast. Conversant. In no visible distress. Denies overnight issues.                                            ----------------------------------------------------------  OBJECTIVE  PAST MEDICAL & SURGICAL HISTORY  Descending thoracic aortic aneurysm    Pericardial effusion    Pleural effusion                                              -----------------------------------------------------------  ALLERGIES:  Allergy Status Unknown                                            ------------------------------------------------------------    HOME MEDICATIONS  Home Medications:  labetalol 100 mg oral tablet: 1 tab(s) orally once a day (16 Sep 2022 19:21)  melatonin 5 mg oral tablet: 1 tab(s) orally once a day (at bedtime), As needed, Insomnia (16 Sep 2022 19:21)  pantoprazole 40 mg oral delayed release tablet: 1 tab(s) orally once a day (before a meal) (16 Sep 2022 19:21)                           MEDICATIONS:  STANDING MEDICATIONS  acetaminophen   IVPB .. 675 milliGRAM(s) IV Intermittent once  atorvastatin 20 milliGRAM(s) Oral at bedtime  heparin   Injectable 5000 Unit(s) SubCutaneous every 12 hours  hydrocortisone 1% Cream 1 Application(s) Topical two times a day  labetalol 100 milliGRAM(s) Oral every 12 hours  melatonin 5 milliGRAM(s) Oral at bedtime  pantoprazole    Tablet 40 milliGRAM(s) Oral before breakfast  petrolatum white Ointment 1 Application(s) Topical two times a day    PRN MEDICATIONS                                            ------------------------------------------------------------  VITAL SIGNS: Last 24 Hours  T(C): 36.1 (23 Dec 2022 05:33), Max: 36.5 (22 Dec 2022 22:04)  T(F): 96.9 (23 Dec 2022 05:33), Max: 97.7 (22 Dec 2022 22:04)  HR: 91 (23 Dec 2022 05:33) (69 - 150)  BP: 125/83 (23 Dec 2022 05:33) (77/57 - 135/79)  BP(mean): --  RR: 18 (23 Dec 2022 05:33) (18 - 20)  SpO2: 94% (23 Dec 2022 05:33) (94% - 99%)      12-22-22 @ 07:01  -  12-23-22 @ 07:00  --------------------------------------------------------  IN: 0 mL / OUT: 500 mL / NET: -500 mL    12-23-22 @ 07:01  -  12-23-22 @ 11:07  --------------------------------------------------------  IN: 240 mL / OUT: 0 mL / NET: 240 mL                                             --------------------------------------------------------------  LABS:                        12.7   7.53  )-----------( 126      ( 22 Dec 2022 22:58 )             40.3     12-22    141  |  104  |  23<H>  ----------------------------<  123<H>  4.9   |  22  |  1.1    Ca    9.2      22 Dec 2022 22:58  Mg     1.9     12-22    TPro  6.9  /  Alb  4.1  /  TBili  0.4  /  DBili  x   /  AST  28  /  ALT  21  /  AlkPhos  161<H>  12-22          Troponin T, Serum: <0.01 ng/mL (12-22-22 @ 22:58)          CARDIAC MARKERS ( 22 Dec 2022 22:58 )  x     / <0.01 ng/mL / x     / x     / x                                                  -------------------------------------------------------------  RADIOLOGY:  ACC: [id number] EXAM: CT ANGIO ABD PELV (W)AW   ACC: [id number] EXAM: CT ANGIO CHEST AORTA [facility]  PROCEDURE DATE: 12/22/2022  IMPRESSION:  Since 9/13/2022:  1. No significant interval change. 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 No evidence of aortic rupture.  2. Unchanged moderate pericardial effusion. Unchanged moderate left, small right bilateral pleural effusions.  3. No evidence of acute intra-abdominal pathology.  YAIR HIGGINS MD; Attending Radiologist  This document has been electronically signed. Dec 22 2022 11:59PM                                            --------------------------------------------------------------    PHYSICAL EXAM:  CONSTITUTIONAL: Well-appearing and in no apparent distress.    EYES: PERRLA and symmetric, EOMI, No conjunctival injection or pallor. Sclera anicteric.    ENMT: Moist mucous membranes. No external nasal lesions. No gross hearing impairment noted.  	NECK: Supple, symmetric and without tracheal deviation.    RESPIRATORY: No respiratory distress. No obvious use of accessory muscles. Lungs CTAB with no crackling, wheezing, rhonchi or rubs.    CARDIOVASCULAR: Regular rate and rhythm. Normal S1 and S2. Systolic murmur present. No rubs or gallops. Radial pulses 2+. 1+ lower extremity pitting edema bilaterally.    GASTROINTESTINAL: Soft, non-tender to palpation in all quadrants. No palpable masses. No appreciable hernias.    MUSCULOSKELETAL: Examination of all four extremities without obvious misalignment, normal range of motion without pain, no spinal tenderness, normal muscle strength/tone.    SKIN: Erythema in mid-back (with pruritus) and over distal bilateral lower extremities.    NEUROLOGIC: No focal neurologic deficits noted.    PSYCHIATRIC: A+O x 2 (person and place but not time).                                           --------------------------------------------------------------       SULEMA POZO 87y Female  MRN#: [id number]     Hospital Day: 0    SUMMARY: 86 y/o female with PMHx of descending aortic aneurysm and moderate pericardial effusion, admitted from [facility] for tachycardia to 130s and hypotension to 77/57 due to SVT. BP stabilized s/p 1L LR. Currently on labetalol 100mg PO q12h. 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     SUBJECTIVE  No reported acute overnight events. Awake in bed, eating breakfast. Conversant. In no visible distress. Denies overnight issues.                                            ----------------------------------------------------------  OBJECTIVE  PAST MEDICAL & SURGICAL HISTORY  Descending thoracic aortic aneurysm    Pericardial effusion    Pleural effusion                                              -----------------------------------------------------------  ALLERGIES:  Allergy Status Unknown                                            ------------------------------------------------------------    HOME MEDICATIONS  Home Medications:  labetalol 100 mg oral tablet: 1 tab(s) orally once a day (16 Sep 2022 19:21)  melatonin 5 mg oral tablet: 1 tab(s) orally once a day (at bedtime), As needed, Insomnia (16 Sep 2022 19:21)  pantoprazole 40 mg oral delayed release tablet: 1 tab(s) orally once a day (before a meal) (16 Sep 2022 19:21)                           MEDICATIONS:  STANDING MEDICATIONS  acetaminophen   IVPB .. 675 milliGRAM(s) IV Intermittent once  atorvastatin 20 milliGRAM(s) Oral at bedtime  heparin   Injectable 5000 Unit(s) SubCutaneous every 12 hours  hydrocortisone 1% Cream 1 Application(s) Topical two times a day  labetalol 100 milliGRAM(s) Oral every 12 hours  melatonin 5 milliGRAM(s) Oral at bedtime  pantoprazole    Tablet 40 milliGRAM(s) Oral before breakfast  petrolatum white Ointment 1 Application(s) Topical two times a day    PRN MEDICATIONS                                            ------------------------------------------------------------  VITAL SIGNS: Last 24 Hours  T(C): 36.1 (23 Dec 2022 05:33), Max: 36.5 (22 Dec 2022 22:04)  T(F): 96.9 (23 Dec 2022 05:33), Max: 97.7 (22 Dec 2022 22:04)  HR: 91 (23 Dec 2022 05:33) (69 - 150)  BP: 125/83 (23 Dec 2022 05:33) (77/57 - 135/79)  BP(mean): --  RR: 18 (23 Dec 2022 05:33) (18 - 20)  SpO2: 94% (23 Dec 2022 05:33) (94% - 99%)      12-22-22 @ 07:01  -  12-23-22 @ 07:00  --------------------------------------------------------  IN: 0 mL / OUT: 500 mL / NET: -500 mL    12-23-22 @ 07:01  -  12-23-22 @ 11:07  --------------------------------------------------------  IN: 240 mL / OUT: 0 mL / NET: 240 mL                                             --------------------------------------------------------------  LABS:                        12.7   7.53  )-----------( 126      ( 22 Dec 2022 22:58 )             40.3     12-22    141  |  104  |  23<H>  ----------------------------<  123<H>  4.9   |  22  |  1.1    Ca    9.2      22 Dec 2022 22:58  Mg     1.9     12-22    TPro  6.9  /  Alb  4.1  /  TBili  0.4  /  DBili  x   /  AST  28  /  ALT  21  /  AlkPhos  161<H>  12-22          Troponin T, Serum: <0.01 ng/mL (12-22-22 @ 22:58)          CARDIAC MARKERS ( 22 Dec 2022 22:58 )  x     / <0.01 ng/mL / x     / x     / x                                                  -------------------------------------------------------------  RADIOLOGY:  ACC: [id number] EXAM: CT ANGIO ABD PELV (W)AW   ACC: [id number] EXAM: CT ANGIO CHEST AORTA [facility]  PROCEDURE DATE: 12/22/2022  IMPRESSION:  Since 9/13/2022:  1. No significant interval change. 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 No evidence of aortic rupture.  2. Unchanged moderate pericardial effusion. Unchanged moderate left, small right bilateral pleural effusions.  3. No evidence of acute intra-abdominal pathology.  YAIR HIGGINS MD; Attending Radiologist  This document has been electronically signed. Dec 22 2022 11:59PM                                            --------------------------------------------------------------    PHYSICAL EXAM:  CONSTITUTIONAL: Well-appearing and in no apparent distress.    EYES: PERRLA and symmetric, EOMI, No conjunctival injection or pallor. Sclera anicteric.    ENMT: Moist mucous membranes. No external nasal lesions. No gross hearing impairment noted.  	NECK: Supple, symmetric and without tracheal deviation.    RESPIRATORY: No respiratory distress. No obvious use of accessory muscles. Lungs CTAB with no crackling, wheezing, rhonchi or rubs.    CARDIOVASCULAR: Regular rate and rhythm. Normal S1 and S2. Systolic murmur present. No rubs or gallops. Radial pulses 2+. 1+ lower extremity pitting edema bilaterally.    GASTROINTESTINAL: Soft, non-tender to palpation in all quadrants. No palpable masses. No appreciable hernias.    MUSCULOSKELETAL: Examination of all four extremities without obvious misalignment, normal range of motion without pain, no spinal tenderness, normal muscle strength/tone.    SKIN: Erythema in mid-back (with pruritus) and over distal bilateral lower extremities.    NEUROLOGIC: No focal neurologic deficits noted.    PSYCHIATRIC: A+O x 2 (person and place but not time).                                           --------------------------------------------------------------

## 2022-12-23 NOTE — H&P ADULT - HISTORY OF PRESENT ILLNESS
Case of 87-year-old female with PMHx descending aortic aneurysm, hx of moderate pericardial effusion presents to ED from [facility] for tachycardia and hypotension and appearing pale as per NH paperwork.    Pt alert and oriented in ED. Pt reports mid-back pain that started earlier today. She denies all fall/trauma. Denies chest pain, shortness of breath, abd pain, nausea, vomiting, diarrhea, bloody/dark stools, fever/chills, URI sxs. States she has otherwise been feeling     In ED triage, vitals showed BP 70s/50s and tachycardia in the 130s    Given on liter LR bolus, vitals recovered afterwards  CT angio chest and abdomen done and Since 9/13/2022: 1. No significant interval change. 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 No evidence of aortic rupture. 2. Unchanged moderate pericardial effusion. Unchanged moderate left, small right bilateral pleural effusions. 3. No evidence of acute intra-abdominal pathology.   Case of 87-year-old female with PMHx descending aortic aneurysm, hx of moderate pericardial effusion presents to ED from [facility] for tachycardia and hypotension and appearing pale as per NH paperwork.    Patient alert and oriented in ED.   On questioning, she says that she was feeling fine with no symptoms related to her abnormal vitals.  On ROS, she says that she has chronic mid back pain.  She denies all fall/trauma, chest pain, shortness of breath, abdominal pain, nausea, vomiting, diarrhea, bloody/dark stools, fever/chills, GI or  symptoms    In ED triage, vitals showed BP 70s/50s and tachycardia in the 130s. EKG showing SVT.    Given on liter LR bolus, vitals recovered afterwards  CT angio chest and abdomen done and Since 9/13/2022: 1. No significant interval change. 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 No evidence of aortic rupture. 2. Unchanged moderate pericardial effusion. Unchanged moderate left, small right bilateral pleural effusions. 3. No evidence of acute intra-abdominal pathology.   Case of 87-year-old female with PMHx descending aortic aneurysm, hx of moderate pericardial effusion presents to ED from [facility] for tachycardia and hypotension and appearing pale as per NH paperwork.  Patient alert and oriented in ED.   On questioning, she says that she was feeling fine with no symptoms related to her abnormal vitals.  On ROS, she says that she has chronic mid back pain.  She denies all fall/trauma, chest pain, shortness of breath, abdominal pain, nausea, vomiting, diarrhea, bloody/dark stools, fever/chills, GI or  symptoms    In ED triage, vitals showed BP 70s/50s and tachycardia in the 130s. EKG showing SVT.  Given on liter LR bolus, vitals recovered afterwards  CT angio chest and abdomen done and Since 9/13/2022: 1. No significant interval change. 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 No evidence of aortic rupture. 2. Unchanged moderate pericardial effusion. Unchanged moderate left, small right bilateral pleural effusions. 3. No evidence of acute intra-abdominal pathology.

## 2022-12-23 NOTE — PROGRESS NOTE ADULT - ASSESSMENT
ASSESSMENT: 86 y/o female with PMHx of descending aortic aneurysm and moderate pericardial effusion, admitted from [facility] for tachycardia to 130s and hypotension to 77/57 due to SVT. BP stabilized s/p 1L LR. Currently on labetalol 100mg PO q12h. 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     Plans: ASSESSMENT: 86 y/o female with PMHx of descending aortic aneurysm and moderate pericardial effusion, admitted from [facility] for tachycardia to 130s and hypotension to 77/57 due to SVT. BP stabilized s/p 1L LR. Currently on labetalol 100mg PO q12h. 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     Plans:  # SVT  # Hypotension - resolved  # Pericardial effusion  - EKG on admission showed SVT with tachycardia to 130s.  - Hypotensive to 77/57 on admission.  - No fevers or other signs of sepsis.   - Troponin negative x1.  - BNP 1283.  - CT angio chest shows unchanged moderate pericardial effusion.   - Continue labetalol 100mg TID.  - Continue telemetry monitoring.   - f/u Echo.    # b/l pleural effusions - moderate left, small right  - Seen on CT angio chest, unchanged from prior.   - Consider pulmonology consult for possible thoracentesis, but likely too small to tap.    # Descending aortic aneurysm - 6.6cm x 6.4cm - stable, not ruptured  # Type B intramural hematoma/thrombosed aortic dissection - stable  - Seen on CT chest, unchanged from prior, no signs of rupture.  - Continue labetalol 100mg BID.  - Maintain tight BP control (SBP < 140).  - Maintain active type and screen.  - As per vascular surgery note in 9/2022: "The patient has an extent 2 thoracoabdominal  aneurysm involving the entire aorta from the left SC artery to the aortic bifurcation. She is not a candidate for an endovascular repair of the aneurysm. Repair  would require an open operation which carries with it a high risk of cardiopulmonary complications and high risk of paraplegia. Due to her medical comorbidities she is not a suitable candidate for this type of repair. There is no planned intervention at this time."    # Elevated ALP  - , AST and ALT WNL on admission.  - Continue to trend LFTs.     # b/l lower extremity pitting edema and erythema - likely stasis dermatitis due to chronic venous insufficiency  # Erythema and pruritus of mid-back  - Continue hydrocortisone 1% cream BID.     # DVT PPX  - Heparin 5000U BID.    # GI PPX  - Pantoprazole 40mg PO qAM.    # Activity  - Ambulate with assistance.    # Diet  - DASH/TLC.    # Dispo  - Acute for monitoring of SVT. Possible discharge in 24-48 hours.  - From [facility].

## 2022-12-23 NOTE — H&P ADULT - ATTENDING COMMENTS
87-year-old female with PMHx descending aortic aneurysm, hx of moderate pericardial effusion presents to ED from [facility] for tachycardia and hypotension and appearing pale as per NH paperwork.  In ED triage, vitals showed BP 70s/50s and tachycardia in the 130s. EKG showing SVT.  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 No evidence of aortic rupture. Unchanged moderate pericardial effusion. Unchanged moderate left, small right bilateral pleural effusions.    PHYSICAL EXAM:  CONSTITUTIONAL: No acute distress, AAOx2 (person and place)  PULMONARY: Clear to auscultation bilaterally; no added sounds  CARDIOVASCULAR: Regular rate and rhythm; systolic murmur  GASTROINTESTINAL: Soft, non-tender, non-distended; bowel sounds present  MUSCULOSKELETAL: 2+ peripheral pulses; bilateral +1 edema with redness  NEUROLOGY:no focal motor/sensory defecits   Skin: erythema in mid back (patient was itching that area)      #Hypotension and tachycardia in the setting of SVT  #Moderate Pericardial Effusion   #Moderate left and small right pleural effusion   - s/p IVF in ED   - monitor on telemetry for 24 hours  - monitor blood pressure, IVF as needed   - repeat EKG if tachycardic   - c/w labetalol for now   - repeat echocardiogram to rule out tamponade given pt still hypotensive today and moderate pericardial effusion on CTA  - monitor oxygenation, consider pulmonary consult for thoracentesis if dyspneic or hypoxic     #Descending aortic aneurysm- stable  - On CT scan: Unchanged type B intramural hematoma/thrombosed aortic dissection within an aneurysmal thoracic aorta measuring up to 6.6 x 6.4 cm. No evidence of aortic rupture.  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 She is not a candidate for an endovascular repair of the aneurysm. Repair  would require an open operation which carries with it a high risk of cardiopulmonary complications and high risk of paraplegia. Due to her medical comorbidities she is not a suitable candidate for this type of repair. There is no planned intervention at this time  - - c/w labetalol , c/w lipitor     #Lower limb edema with erythema  - Suggestive of chronic venous insufficiency with stasis dermatitis      #DVT prophylaxis: Heparin 5000 BID     Dispo: pending echocardiogram, monitor on telemetry for 24 hours , monitor BP     Total time spent to complete patient's bedside assessment, review medical chart, discuss medical plan of care with covering medical team was more than 70 minutes  with >50% of time spent face to face with patient, discussion with patient/family and/or coordination of care    Symone Guzman, DO

## 2022-12-23 NOTE — H&P ADULT - ASSESSMENT
Case of 87-year-old female with PMHx descending aortic aneurysm, hx of moderate pericardial effusion presents to ED from [facility] for tachycardia and hypotension and appearing pale as per NH paperwork.    #Hypotension and tachycardia  - Differential includes progression of pericardial effusion into tamponade vs aneurysmal rupture sepsis vs dehydration  - Pericardial effusion is stable on imaging, no clear etiology especially in the setting of pleural effusions as well, undiagnosed malignancy?  - Aneurysmal rupture ruled out on imaging  - Patient not septic on admission and has no symptoms suggestive of infection  - Dehydration could be the possible cause in the setting of normalization of vitals after fluids and the slightly elevated creatinine and BUN (does not qualify as EBER)  - Will monitor vitals and keep on hydration for now    #Moderate pericardial effusion  #EKG abnormalities  - First EKG in ED suggestive of SVT, 2nd EKG sinus tachy  - C/s cardio?    #Descending aortic aneurysm   - Avoid Fluoroquinolones  - Consider beta-blockers and ACE inhibitors on discharge, will hold off on them as she was initially hypotensive  - Will start on low dose statin  - Will order type and screen with AM labs  - Patient was seen by vascular in September (she presented with colitis) and as per the note: The patient has an extent 2 thoracoabdominal  aneurysm involving the entire aorta from the left SC artery to the aortic bifurcation. She is not a candidate for an endovascular repair of the aneurysm. Repair  would require an open operation which carries with it a high risk of cardiopulmonary complications and high risk of paraplegia. Due to her medical comorbidities she is not a suitable candidate for this type of repair. There is no planned intervention at this time    #Diet:  #DVT prophylaxis  #Activity: Ambulate with assisstance Case of 87-year-old female with PMHx descending aortic aneurysm, hx of moderate pericardial effusion presents to ED from [facility] for tachycardia and hypotension and appearing pale as per NH paperwork.    #Hypotension and tachycardia  - Differential includes unstable arrhythmia vs progression of pericardial effusion into tamponade vs aneurysmal rupture sepsis vs dehydration  - Based on initial EKG, patient had an SVT (caused the hypotension or caused by the hypotension, unknown)  - Pericardial effusion is stable on imaging, no clear etiology especially in the setting of pleural effusions as well, undiagnosed malignancy?  - Aneurysmal rupture ruled out on imaging  - Patient not septic on admission and has no symptoms suggestive of infection  - Dehydration could be the possible cause in the setting of normalization of vitals after fluids and the slightly elevated creatinine and BUN (does not qualify as EBER)  - Tele-monitoring  - Will monitor vitals and keep on hydration for now    #Moderate pericardial effusion  #EKG abnormalities  - First EKG in ED suggestive of SVT, 2nd EKG sinus tachy  - C/s cardio?    #Descending aortic aneurysm   - Avoid Fluoroquinolones  - Consider beta-blockers and ACE inhibitors on discharge, will hold off on them as she was initially hypotensive  - Will start on low dose statin  - Will order type and screen with AM labs  - Patient was seen by vascular in September (she presented with colitis) and as per the note: The patient has an extent 2 thoracoabdominal  aneurysm involving the entire aorta from the left SC artery to the aortic bifurcation. She is not a candidate for an endovascular repair of the aneurysm. Repair  would require an open operation which carries with it a high risk of cardiopulmonary complications and high risk of paraplegia. Due to her medical comorbidities she is not a suitable candidate for this type of repair. There is no planned intervention at this time    #Diet: DASH  #DVT prophylaxis: Lovenox 40mg OD  #Activity: Ambulate with assisstance  #GI prophylaxis: C/w home PPI                 Case of 87-year-old female with PMHx descending aortic aneurysm, hx of moderate pericardial effusion presents to ED from [facility] for tachycardia and hypotension and appearing pale as per NH paperwork.    #Hypotension and tachycardia  - In the s  - Differential includes unstable arrhythmia vs progression of pericardial effusion into tamponade vs aneurysmal rupture sepsis vs dehydration  - Based on initial EKG, patient had an SVT (caused the hypotension or caused by the hypotension, unknown)  - Pericardial effusion is stable on imaging, no clear etiology especially in the setting of pleural effusions as well, undiagnosed malignancy?  - Aneurysmal rupture ruled out on imaging  - Patient not septic on admission and has no symptoms suggestive of infection  - Dehydration could be the possible cause in the setting of normalization of vitals after fluids and the slightly elevated creatinine and BUN (does not qualify as EBER)  - Tele-monitoring  - Will monitor vitals and keep on hydration for now    #Moderate pericardial effusion  #EKG abnormalities  - First EKG in ED suggestive of SVT, 2nd EKG sinus tachy  - C/s cardio?    #Descending aortic aneurysm   - Avoid Fluoroquinolones  - Consider beta-blockers and ACE inhibitors on discharge, will hold off on them as she was initially hypotensive  - Will start on low dose statin  - Will order type and screen with AM labs  - Patient was seen by vascular in September (she presented with colitis) and as per the note: The patient has an extent 2 thoracoabdominal  aneurysm involving the entire aorta from the left SC artery to the aortic bifurcation. She is not a candidate for an endovascular repair of the aneurysm. Repair  would require an open operation which carries with it a high risk of cardiopulmonary complications and high risk of paraplegia. Due to her medical comorbidities she is not a suitable candidate for this type of repair. There is no planned intervention at this time    #Diet: DASH  #DVT prophylaxis: Lovenox 40mg OD  #Activity: Ambulate with assisstance  #GI prophylaxis: C/w home PPI                 Case of 87-year-old female with PMHx descending aortic aneurysm, hx of moderate pericardial effusion presents to ED from [facility] for tachycardia and hypotension and appearing pale as per NH paperwork.    #Hypotension and tachycardia  - In the setting of her moderate pericardial effusion, she has an element of diastolic dysfunction so any decrease in effective blood volume will cause severe symptoms  - Differential includes unstable arrhythmia vs progression of pericardial effusion into tamponade vs aneurysmal rupture sepsis vs dehydration  - Based on initial EKG, patient had an SVT (caused the hypotension or caused by the hypotension, unknown)  - Pericardial effusion is stable on imaging, no clear etiology especially in the setting of pleural effusions as well, undiagnosed malignancy?  - Aneurysmal rupture ruled out on imaging  - Patient not septic on admission and has no symptoms suggestive of infection  - Dehydration could be the possible cause in the setting of normalization of vitals after fluids and the slightly elevated creatinine and BUN (does not qualify as EBER)  - Tele-monitoring  - Will monitor vitals and keep on hydration for now    #Moderate pericardial effusion  #EKG abnormalities  - First EKG in ED suggestive of SVT, 2nd EKG sinus tachy  - Based on initial EKG, patient had an SVT (caused the hypotension or caused by the hypotension, unknown)  - Trop negative x1, repeat with AM labs  - Consult cardio in AM  - Will resume labetalol at a low dose and up-titrate as tolerated    #Descending aortic aneurysm   - Avoid Fluoroquinolones  - Consider beta-blockers and ACE inhibitors on discharge, will hold off on them as she was initially hypotensive  - Will start on low dose statin  - Will order type and screen with AM labs  - Patient was seen by vascular in September (she presented with colitis) and as per the note: The patient has an extent 2 thoracoabdominal  aneurysm involving the entire aorta from the left SC artery to the aortic bifurcation. She is not a candidate for an endovascular repair of the aneurysm. Repair  would require an open operation which carries with it a high risk of cardiopulmonary complications and high risk of paraplegia. Due to her medical comorbidities she is not a suitable candidate for this type of repair. There is no planned intervention at this time    #Diet: DASH  #DVT prophylaxis: Lovenox 40mg OD  #Activity: Ambulate with assistance  #GI prophylaxis: C/w home PPI                 Case of 87-year-old female with PMHx descending aortic aneurysm, hx of moderate pericardial effusion presents to ED from [facility] for tachycardia and hypotension and appearing pale as per NH paperwork.    #Hypotension and tachycardia  - In the setting of her moderate pericardial effusion, she has an element of diastolic dysfunction so any decrease in effective blood volume will cause severe symptoms  - Differential includes unstable arrhythmia vs progression of pericardial effusion into tamponade vs aneurysmal rupture sepsis vs dehydration  - Based on initial EKG, patient had an SVT (caused the hypotension or caused by the hypotension, unknown)  - Pericardial effusion is stable on imaging, no clear etiology especially in the setting of pleural effusions as well, undiagnosed malignancy?  - Aneurysmal rupture ruled out on imaging  - Patient not septic on admission and has no symptoms suggestive of infection  - Dehydration could be the possible cause in the setting of normalization of vitals after fluids and the slightly elevated creatinine and BUN (does not qualify as EBER)  - Tele-monitoring  - Will monitor vitals and keep on hydration for now    #Moderate pericardial effusion  #EKG abnormalities  - First EKG in ED suggestive of SVT, 2nd EKG sinus tachycardia  - Based on initial EKG, patient had an SVT (caused the hypotension or caused by the hypotension, unknown)  - Trop negative x1, repeat with AM labs  - Consult cardio in AM  - Will resume labetalol at a low dose and up-titrate as tolerated    #Descending aortic aneurysm   - Avoid Fluoroquinolones  - Consider beta-blockers and ACE inhibitors on discharge, will hold off on them as she was initially hypotensive  - Will start on low dose statin  - Will order type and screen with AM labs  - Patient was seen by vascular in September (she presented with colitis) and as per the note: The patient has an extent 2 thoracoabdominal  aneurysm involving the entire aorta from the left SC artery to the aortic bifurcation. She is not a candidate for an endovascular repair of the aneurysm. Repair  would require an open operation which carries with it a high risk of cardiopulmonary complications and high risk of paraplegia. Due to her medical comorbidities she is not a suitable candidate for this type of repair. There is no planned intervention at this time    #Diet: DASH  #DVT prophylaxis: Lovenox 40mg OD  #Activity: Ambulate with assistance  #GI prophylaxis: C/w home PPI   Case of 87-year-old female with PMHx descending aortic aneurysm, hx of moderate pericardial effusion presents to ED from [facility] for tachycardia and hypotension and appearing pale as per NH paperwork.    #Hypotension and tachycardia  - In the setting of her moderate pericardial effusion, she has an element of diastolic dysfunction so any decrease in effective blood volume will cause severe symptoms  - Differential includes unstable arrhythmia vs progression of pericardial effusion into tamponade vs aneurysmal rupture sepsis vs dehydration  - Based on initial EKG, patient had an SVT (caused the hypotension or caused by the hypotension, unknown)  - Pericardial effusion is stable on imaging, no clear etiology especially in the setting of pleural effusions as well, undiagnosed malignancy?  - Aneurysmal rupture ruled out on imaging  - Patient not septic on admission and has no symptoms suggestive of infection  - Dehydration could be the possible cause in the setting of normalization of vitals after fluids and the slightly elevated creatinine and BUN (does not qualify as EBER)  - Tele-monitoring  - Will monitor vitals and encourage oral hydration for now    #Moderate pericardial effusion  #EKG abnormalities  - First EKG in ED suggestive of SVT, 2nd EKG sinus tachycardia  - Based on initial EKG, patient had an SVT (caused the hypotension or caused by the hypotension, unknown)  - Trop negative x1, repeat with AM labs  - Consult cardio in AM  - Will resume labetalol at a low dose (100mg BID) and up-titrate as tolerated    #Descending aortic aneurysm   - Avoid Fluoroquinolones  - Will start on labetalol 100mg BID (she was on 100mg OD at home, unusual dose)  - Consider adding ACE or ARB   - Will start on low dose statin  - Will order type and screen with AM labs  - Patient was seen by vascular in September (she presented with colitis) and as per the note: The patient has an extent 2 thoracoabdominal  aneurysm involving the entire aorta from the left SC artery to the aortic bifurcation. She is not a candidate for an endovascular repair of the aneurysm. Repair  would require an open operation which carries with it a high risk of cardiopulmonary complications and high risk of paraplegia. Due to her medical comorbidities she is not a suitable candidate for this type of repair. There is no planned intervention at this time    #Diet: DASH  #DVT prophylaxis: Lovenox 40mg OD  #Activity: Ambulate with assistance  #GI prophylaxis: C/w home PPI   Case of 87-year-old female with PMHx descending aortic aneurysm, hx of moderate pericardial effusion presents to ED from [facility] for tachycardia and hypotension and appearing pale as per NH paperwork.    #Hypotension and tachycardia  - In the setting of her moderate pericardial effusion, she has an element of diastolic dysfunction so any decrease in effective blood volume will cause severe symptoms  - Differential includes unstable arrhythmia vs progression of pericardial effusion into tamponade vs aneurysmal rupture sepsis vs dehydration  - Based on initial EKG, patient had an SVT (caused the hypotension or caused by the hypotension, unknown)  - Pericardial effusion is stable on imaging, no clear etiology especially in the setting of pleural effusions as well, undiagnosed malignancy?  - Aneurysmal rupture ruled out on imaging  - Patient not septic on admission and has no symptoms suggestive of infection  - Dehydration could be the possible cause in the setting of normalization of vitals after fluids and the slightly elevated creatinine and BUN (does not qualify as EBER)  - Tele-monitoring  - Will monitor vitals and encourage oral hydration for now    #Moderate pericardial effusion  #EKG abnormalities  #Pleural effusions  - First EKG in ED suggestive of SVT, 2nd EKG sinus tachycardia  - Based on initial EKG, patient had an SVT (caused the hypotension or caused by the hypotension, unknown)  - Trop negative x1, repeat with AM labs  - Consult cardio in AM  - Will resume labetalol at a low dose (100mg BID) and up-titrate as tolerated  - Consider pulm consult for tap if feasible     #Descending aortic aneurysm   - Avoid Fluoroquinolones  - Will start on labetalol 100mg BID (she was on 100mg OD at home, unusual dose)  - Consider adding ACE or ARB   - Will start on low dose statin  - Will order type and screen with AM labs  - Patient was seen by vascular in September (she presented with colitis) and as per the note: The patient has an extent 2 thoracoabdominal  aneurysm involving the entire aorta from the left SC artery to the aortic bifurcation. She is not a candidate for an endovascular repair of the aneurysm. Repair  would require an open operation which carries with it a high risk of cardiopulmonary complications and high risk of paraplegia. Due to her medical comorbidities she is not a suitable candidate for this type of repair. There is no planned intervention at this time    #Diet: DASH  #DVT prophylaxis: Lovenox 40mg OD  #Activity: Ambulate with assistance  #GI prophylaxis: C/w home PPI   Case of 87-year-old female with PMHx descending aortic aneurysm, hx of moderate pericardial effusion presents to ED from [facility] for tachycardia and hypotension and appearing pale as per NH paperwork.    #Hypotension and tachycardia- resolved  - In the setting of her moderate pericardial effusion, she has an element of diastolic dysfunction so any decrease in effective blood volume will cause severe symptoms  - Differential includes unstable arrhythmia vs progression of pericardial effusion into tamponade vs aneurysmal rupture sepsis vs dehydration  - Based on initial EKG, patient had an SVT (caused the hypotension or caused by the hypotension, unknown)  - Pericardial effusion is stable on imaging, no clear etiology especially in the setting of pleural effusions as well, undiagnosed malignancy?  - Aneurysmal rupture ruled out on imaging  - Patient not septic on admission and has no symptoms suggestive of infection  - Dehydration could be the possible cause in the setting of normalization of vitals after fluids and the slightly elevated creatinine and BUN (does not qualify as EBER)  - Tele-monitoring  - Will monitor vitals and encourage oral hydration for now    #Moderate pericardial effusion- stable  #EKG abnormalities  #Pleural effusions- stable  - First EKG in ED suggestive of SVT, 2nd EKG sinus tachycardia  - Based on initial EKG, patient had an SVT (caused the hypotension or caused by the hypotension, unknown)  - Trop negative x1, repeat with AM labs  - Consult cardio in AM  - Will resume labetalol at a low dose (100mg BID) and up-titrate as tolerated  - Consider pulm consult for tap if feasible     #Descending aortic aneurysm- stable  - Avoid Fluoroquinolones  - Will start on labetalol 100mg BID (she was on 100mg OD at home, unusual dose)  - Consider adding ACE or ARB   - Will start on low dose statin  - Will order type and screen with AM labs  - Patient was seen by vascular in September (she presented with colitis) and as per the note: The patient has an extent 2 thoracoabdominal  aneurysm involving the entire aorta from the left SC artery to the aortic bifurcation. She is not a candidate for an endovascular repair of the aneurysm. Repair  would require an open operation which carries with it a high risk of cardiopulmonary complications and high risk of paraplegia. Due to her medical comorbidities she is not a suitable candidate for this type of repair. There is no planned intervention at this time    #Lower limb edema with erythema  - Suggestive of chronic venous insufficiency with stasis dermatitis  - Petroleum based cream BID    #Diet: DASH  #DVT prophylaxis: Lovenox 40mg OD  #Activity: Ambulate with assistance  #GI prophylaxis: C/w home PPI   Case of 87-year-old female with PMHx descending aortic aneurysm, hx of moderate pericardial effusion presents to ED from [facility] for tachycardia and hypotension, found to have an SVT.    #Hypotension and tachycardia- resolved  - In the setting of her moderate pericardial effusion, she has an element of diastolic dysfunction so any decrease in effective blood volume will cause severe symptoms  - Differential includes unstable arrhythmia vs progression of pericardial effusion into tamponade vs aneurysmal rupture sepsis vs dehydration  - Based on initial EKG, patient had an SVT (caused the hypotension or caused by the hypotension, unknown)  - Pericardial effusion is stable on imaging, no clear etiology especially in the setting of pleural effusions as well, undiagnosed malignancy?  - Aneurysmal rupture ruled out on imaging  - Patient not septic on admission and has no symptoms suggestive of infection  - Dehydration could be the possible cause in the setting of normalization of vitals after fluids and the slightly elevated creatinine and BUN (does not qualify as EBER)  - Tele-monitoring  - Will monitor vitals and encourage oral hydration for now    #Moderate pericardial effusion- stable  #EKG abnormalities  #Pleural effusions- stable  - First EKG in ED suggestive of SVT, 2nd EKG sinus tachycardia  - Based on initial EKG, patient had an SVT (caused the hypotension or caused by the hypotension, unknown)  - Trop negative x1, repeat with AM labs  - Consult cardio in AM  - Will resume labetalol at a low dose (100mg BID) and up-titrate as tolerated  - Consider pulm consult for tap if feasible     #Descending aortic aneurysm- stable  - On CT scan: 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 No evidence of aortic rupture.  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 She is not a candidate for an endovascular repair of the aneurysm. Repair  would require an open operation which carries with it a high risk of cardiopulmonary complications and high risk of paraplegia. Due to her medical comorbidities she is not a suitable candidate for this type of repair. There is no planned intervention at this time    #Lower limb edema with erythema  - Suggestive of chronic venous insufficiency with stasis dermatitis  - Petroleum based cream BID    #Diet: DASH  #DVT prophylaxis: Lovenox 40mg OD  #Activity: Ambulate with assistance  #GI prophylaxis: C/w home PPI   Case of 87-year-old female with PMHx descending aortic aneurysm, hx of moderate pericardial effusion presents to ED from [facility] for tachycardia and hypotension, found to have an SVT.    #Hypotension and tachycardia- resolved  - In the setting of her moderate pericardial effusion, she has an element of diastolic dysfunction so any decrease in effective blood volume will cause severe symptoms  - Differential includes unstable arrhythmia vs progression of pericardial effusion into tamponade vs aneurysmal rupture sepsis vs dehydration  - Based on initial EKG, patient had an SVT (caused the hypotension or caused by the hypotension, unknown)  - Pericardial effusion is stable on imaging, no clear etiology especially in the setting of pleural effusions as well, undiagnosed malignancy?   - Aneurysmal rupture ruled out on imaging  - Patient not septic on admission and has no symptoms suggestive of infection  - Dehydration could be the possible cause in the setting of normalization of vitals after fluids and the slightly elevated creatinine and BUN (does not qualify as EBER)  - Tele-monitoring  - Will monitor vitals and encourage oral hydration for now    #Moderate pericardial effusion- stable  #EKG abnormalities  #Pleural effusions- stable  - First EKG in ED suggestive of SVT, 2nd EKG sinus tachycardia  - Based on initial EKG, patient had an SVT (caused the hypotension or caused by the hypotension, unknown)  - Trop negative x1, repeat with AM labs  - Pro-BNP elevated but not that significant considering her age  - Consult cardio in AM  - Will resume labetalol at a low dose (100mg BID) and up-titrate as tolerated  - Consider pulm consult for tap if feasible     #Descending aortic aneurysm- stable  - On CT scan: 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 No evidence of aortic rupture.  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 She is not a candidate for an endovascular repair of the aneurysm. Repair  would require an open operation which carries with it a high risk of cardiopulmonary complications and high risk of paraplegia. Due to her medical comorbidities she is not a suitable candidate for this type of repair. There is no planned intervention at this time    #Elevated ALK  #Pericardial effusion  #Pleural Effusions  - Patient looks cachectic on exam  - Possible undiagnosed malignancy?  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

## 2022-12-23 NOTE — H&P ADULT - NSHPPHYSICALEXAM_GEN_ALL_CORE
PHYSICAL EXAM:  CONSTITUTIONAL: No acute distress, AAOx2 (person and place), not confused  PULMONARY: Clear to auscultation bilaterally; no added sounds  CARDIOVASCULAR: Regular rate and rhythm; systolic murmur  GASTROINTESTINAL: Soft, non-tender, non-distended; bowel sounds present  MUSCULOSKELETAL: 2+ peripheral pulses; bilateral +1 edema with redness suggestive of venous insufficiency  NEUROLOGY: non-focal PHYSICAL EXAM:  CONSTITUTIONAL: No acute distress, AAOx2 (person and place), not confused  PULMONARY: Clear to auscultation bilaterally; no added sounds  CARDIOVASCULAR: Regular rate and rhythm; systolic murmur  GASTROINTESTINAL: Soft, non-tender, non-distended; bowel sounds present  MUSCULOSKELETAL: 2+ peripheral pulses; bilateral +1 edema with redness  NEUROLOGY: non-focal  Skin: erythema in mid back (patient was itching that area)

## 2022-12-23 NOTE — PATIENT PROFILE ADULT - FALL HARM RISK - HARM RISK INTERVENTIONS

## 2022-12-24 LAB
ANION GAP SERPL CALC-SCNC: 11 MMOL/L — SIGNIFICANT CHANGE UP (ref 7–14)
BASOPHILS # BLD AUTO: 0.04 K/UL — SIGNIFICANT CHANGE UP (ref 0–0.2)
BASOPHILS NFR BLD AUTO: 0.8 % — SIGNIFICANT CHANGE UP (ref 0–1)
BUN SERPL-MCNC: 16 MG/DL — SIGNIFICANT CHANGE UP (ref 10–20)
CALCIUM SERPL-MCNC: 8 MG/DL — LOW (ref 8.4–10.5)
CHLORIDE SERPL-SCNC: 108 MMOL/L — SIGNIFICANT CHANGE UP (ref 98–110)
CO2 SERPL-SCNC: 22 MMOL/L — SIGNIFICANT CHANGE UP (ref 17–32)
CREAT SERPL-MCNC: 0.9 MG/DL — SIGNIFICANT CHANGE UP (ref 0.7–1.5)
EGFR: 63 ML/MIN/1.73M2 — SIGNIFICANT CHANGE UP
EOSINOPHIL # BLD AUTO: 0.23 K/UL — SIGNIFICANT CHANGE UP (ref 0–0.7)
EOSINOPHIL NFR BLD AUTO: 4.8 % — SIGNIFICANT CHANGE UP (ref 0–8)
GLUCOSE SERPL-MCNC: 89 MG/DL — SIGNIFICANT CHANGE UP (ref 70–99)
HCT VFR BLD CALC: 32.2 % — LOW (ref 37–47)
HGB BLD-MCNC: 10.1 G/DL — LOW (ref 12–16)
IMM GRANULOCYTES NFR BLD AUTO: 0.2 % — SIGNIFICANT CHANGE UP (ref 0.1–0.3)
LYMPHOCYTES # BLD AUTO: 0.74 K/UL — LOW (ref 1.2–3.4)
LYMPHOCYTES # BLD AUTO: 15.4 % — LOW (ref 20.5–51.1)
MAGNESIUM SERPL-MCNC: 1.9 MG/DL — SIGNIFICANT CHANGE UP (ref 1.8–2.4)
MCHC RBC-ENTMCNC: 28.8 PG — SIGNIFICANT CHANGE UP (ref 27–31)
MCHC RBC-ENTMCNC: 31.4 G/DL — LOW (ref 32–37)
MCV RBC AUTO: 91.7 FL — SIGNIFICANT CHANGE UP (ref 81–99)
MONOCYTES # BLD AUTO: 0.55 K/UL — SIGNIFICANT CHANGE UP (ref 0.1–0.6)
MONOCYTES NFR BLD AUTO: 11.5 % — HIGH (ref 1.7–9.3)
NEUTROPHILS # BLD AUTO: 3.23 K/UL — SIGNIFICANT CHANGE UP (ref 1.4–6.5)
NEUTROPHILS NFR BLD AUTO: 67.3 % — SIGNIFICANT CHANGE UP (ref 42.2–75.2)
NRBC # BLD: 0 /100 WBCS — SIGNIFICANT CHANGE UP (ref 0–0)
PLATELET # BLD AUTO: 132 K/UL — SIGNIFICANT CHANGE UP (ref 130–400)
POTASSIUM SERPL-MCNC: 4 MMOL/L — SIGNIFICANT CHANGE UP (ref 3.5–5)
POTASSIUM SERPL-SCNC: 4 MMOL/L — SIGNIFICANT CHANGE UP (ref 3.5–5)
RBC # BLD: 3.51 M/UL — LOW (ref 4.2–5.4)
RBC # FLD: 15.8 % — HIGH (ref 11.5–14.5)
SODIUM SERPL-SCNC: 141 MMOL/L — SIGNIFICANT CHANGE UP (ref 135–146)
WBC # BLD: 4.8 K/UL — SIGNIFICANT CHANGE UP (ref 4.8–10.8)
WBC # FLD AUTO: 4.8 K/UL — SIGNIFICANT CHANGE UP (ref 4.8–10.8)

## 2022-12-24 PROCEDURE — 93306 TTE W/DOPPLER COMPLETE: CPT | Mod: 26

## 2022-12-24 PROCEDURE — 99233 SBSQ HOSP IP/OBS HIGH 50: CPT

## 2022-12-24 RX ORDER — MAGNESIUM SULFATE 500 MG/ML
1 VIAL (ML) INJECTION ONCE
Refills: 0 | Status: COMPLETED | OUTPATIENT
Start: 2022-12-24 | End: 2022-12-24

## 2022-12-24 RX ORDER — METOPROLOL TARTRATE 50 MG
12.5 TABLET ORAL
Refills: 0 | Status: DISCONTINUED | OUTPATIENT
Start: 2022-12-24 | End: 2022-12-26

## 2022-12-24 RX ADMIN — Medication 5 MILLIGRAM(S): at 22:06

## 2022-12-24 RX ADMIN — HEPARIN SODIUM 5000 UNIT(S): 5000 INJECTION INTRAVENOUS; SUBCUTANEOUS at 17:15

## 2022-12-24 RX ADMIN — Medication 1 APPLICATION(S): at 06:14

## 2022-12-24 RX ADMIN — Medication 100 GRAM(S): at 13:24

## 2022-12-24 RX ADMIN — HEPARIN SODIUM 5000 UNIT(S): 5000 INJECTION INTRAVENOUS; SUBCUTANEOUS at 06:10

## 2022-12-24 RX ADMIN — Medication 270 MILLIGRAM(S): at 22:07

## 2022-12-24 RX ADMIN — PANTOPRAZOLE SODIUM 40 MILLIGRAM(S): 20 TABLET, DELAYED RELEASE ORAL at 06:10

## 2022-12-24 RX ADMIN — Medication 1 APPLICATION(S): at 18:22

## 2022-12-24 RX ADMIN — ATORVASTATIN CALCIUM 20 MILLIGRAM(S): 80 TABLET, FILM COATED ORAL at 22:07

## 2022-12-24 RX ADMIN — Medication 1 APPLICATION(S): at 17:14

## 2022-12-24 RX ADMIN — Medication 12.5 MILLIGRAM(S): at 17:14

## 2022-12-24 NOTE — PROGRESS NOTE ADULT - SUBJECTIVE AND OBJECTIVE BOX
SULEMA POZO 87y Female  MRN#: [id number]     Hospital Day: 1d    SUMMARY: 88 y/o female with PMHx of descending aortic aneurysm and moderate pericardial effusion, admitted from [facility] for tachycardia to 130s and hypotension to 77/57 due to SVT. BP stabilized s/p 1L LR. Currently on labetalol 100mg PO q12h. 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 Currently pending Echo to rule out cardiac tamponade.    SUBJECTIVE  No reported acute overnight events. Patient denies any SOB, chest pain and chest pressure.                                             ----------------------------------------------------------  OBJECTIVE  PAST MEDICAL & SURGICAL HISTORY  Descending thoracic aortic aneurysm    Pericardial effusion    Pleural effusion                                              -----------------------------------------------------------  ALLERGIES:  Allergy Status Unknown                                            ------------------------------------------------------------    HOME MEDICATIONS  Home Medications:  labetalol 100 mg oral tablet: 1 tab(s) orally once a day (16 Sep 2022 19:21)  melatonin 5 mg oral tablet: 1 tab(s) orally once a day (at bedtime), As needed, Insomnia (16 Sep 2022 19:21)  pantoprazole 40 mg oral delayed release tablet: 1 tab(s) orally once a day (before a meal) (16 Sep 2022 19:21)                           MEDICATIONS:  STANDING MEDICATIONS  acetaminophen   IVPB .. 675 milliGRAM(s) IV Intermittent once  atorvastatin 20 milliGRAM(s) Oral at bedtime  heparin   Injectable 5000 Unit(s) SubCutaneous every 12 hours  hydrocortisone 1% Cream 1 Application(s) Topical two times a day  magnesium sulfate  IVPB 1 Gram(s) IV Intermittent once  melatonin 5 milliGRAM(s) Oral at bedtime  metoprolol tartrate 12.5 milliGRAM(s) Oral two times a day  pantoprazole    Tablet 40 milliGRAM(s) Oral before breakfast  petrolatum white Ointment 1 Application(s) Topical two times a day    PRN MEDICATIONS                                            ------------------------------------------------------------  VITAL SIGNS: Last 24 Hours  T(C): 36.3 (24 Dec 2022 04:40), Max: 36.3 (24 Dec 2022 04:40)  T(F): 97.4 (24 Dec 2022 04:40), Max: 97.4 (24 Dec 2022 04:40)  HR: 65 (24 Dec 2022 06:09) (65 - 87)  BP: 99/59 (24 Dec 2022 06:09) (83/61 - 105/74)  BP(mean): --  RR: 18 (24 Dec 2022 04:40) (17 - 18)  SpO2: 94% (24 Dec 2022 04:40) (94% - 97%)      12-23-22 @ 07:01  -  12-24-22 @ 07:00  --------------------------------------------------------  IN: 240 mL / OUT: 0 mL / NET: 240 mL                                             --------------------------------------------------------------  LABS:                        10.1   4.80  )-----------( 132      ( 24 Dec 2022 07:25 )             32.2     12-24    141  |  108  |  16  ----------------------------<  89  4.0   |  22  |  0.9    Ca    8.0<L>      24 Dec 2022 07:25  Mg     1.9     12-24    TPro  6.9  /  Alb  4.1  /  TBili  0.4  /  DBili  x   /  AST  28  /  ALT  21  /  AlkPhos  161<H>  12-22          Troponin T, Serum: <0.01 ng/mL (12-23-22 @ 13:09)          CARDIAC MARKERS ( 23 Dec 2022 13:09 )  x     / <0.01 ng/mL / x     / x     / x      CARDIAC MARKERS ( 22 Dec 2022 22:58 )  x     / <0.01 ng/mL / x     / x     / x                                                  -------------------------------------------------------------  RADIOLOGY:  ACC: [id number] EXAM: CT ANGIO ABD PELV (W)AW   ACC: [id number] EXAM: CT ANGIO CHEST AORTA [facility]  PROCEDURE DATE: 12/22/2022  IMPRESSION:  Since 9/13/2022:  1. No significant interval change. 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 No evidence of aortic rupture.  2. Unchanged moderate pericardial effusion. Unchanged moderate left, small right bilateral pleural effusions.  3. No evidence of acute intra-abdominal pathology.  YAIR HIGGINS MD; Attending Radiologist  This document has been electronically signed. Dec 22 2022 11:59PM                                          --------------------------------------------------------------    PHYSICAL EXAM:  CONSTITUTIONAL: Well-appearing and in no apparent distress.    EYES: PERRLA and symmetric, EOMI, No conjunctival injection or pallor. Sclera anicteric.    ENMT: Moist mucous membranes. No external nasal lesions. No gross hearing impairment noted.  	NECK: Supple, symmetric and without tracheal deviation.    RESPIRATORY: No respiratory distress. No obvious use of accessory muscles. Lungs CTAB with no crackling, wheezing, rhonchi or rubs.    CARDIOVASCULAR: Regular rate and rhythm. Normal S1 and S2. Systolic murmur present. No rubs or gallops. Radial pulses 2+. 1+ lower extremity pitting edema bilaterally.    GASTROINTESTINAL: Soft, non-tender to palpation in all quadrants. No palpable masses. No appreciable hernias.    MUSCULOSKELETAL: Examination of all four extremities without obvious misalignment, normal range of motion without pain, no spinal tenderness, normal muscle strength/tone.    SKIN: Erythema in mid-back (with pruritus) and over distal bilateral lower extremities.    NEUROLOGIC: No focal neurologic deficits noted.    PSYCHIATRIC: A+O x 2 (person and place but not time).                                           --------------------------------------------------------------         SULEMA POZO 87y Female  MRN#: [id number]     Hospital Day: 1d    SUMMARY: 86 y/o female with PMHx of descending aortic aneurysm and moderate pericardial effusion, admitted from [facility] for tachycardia to 130s and hypotension to 77/57 due to SVT. BP stabilized s/p 1L LR. s/p labetalol, currently on Lopressor 12.5mg BID. 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 Currently pending Echo to rule out cardiac tamponade.    SUBJECTIVE  No reported acute overnight events. Patient denies any SOB, chest pain and chest pressure.                                             ----------------------------------------------------------  OBJECTIVE  PAST MEDICAL & SURGICAL HISTORY  Descending thoracic aortic aneurysm    Pericardial effusion    Pleural effusion                                              -----------------------------------------------------------  ALLERGIES:  Allergy Status Unknown                                            ------------------------------------------------------------    HOME MEDICATIONS  Home Medications:  labetalol 100 mg oral tablet: 1 tab(s) orally once a day (16 Sep 2022 19:21)  melatonin 5 mg oral tablet: 1 tab(s) orally once a day (at bedtime), As needed, Insomnia (16 Sep 2022 19:21)  pantoprazole 40 mg oral delayed release tablet: 1 tab(s) orally once a day (before a meal) (16 Sep 2022 19:21)                           MEDICATIONS:  STANDING MEDICATIONS  acetaminophen   IVPB .. 675 milliGRAM(s) IV Intermittent once  atorvastatin 20 milliGRAM(s) Oral at bedtime  heparin   Injectable 5000 Unit(s) SubCutaneous every 12 hours  hydrocortisone 1% Cream 1 Application(s) Topical two times a day  magnesium sulfate  IVPB 1 Gram(s) IV Intermittent once  melatonin 5 milliGRAM(s) Oral at bedtime  metoprolol tartrate 12.5 milliGRAM(s) Oral two times a day  pantoprazole    Tablet 40 milliGRAM(s) Oral before breakfast  petrolatum white Ointment 1 Application(s) Topical two times a day    PRN MEDICATIONS                                            ------------------------------------------------------------  VITAL SIGNS: Last 24 Hours  T(C): 36.3 (24 Dec 2022 04:40), Max: 36.3 (24 Dec 2022 04:40)  T(F): 97.4 (24 Dec 2022 04:40), Max: 97.4 (24 Dec 2022 04:40)  HR: 65 (24 Dec 2022 06:09) (65 - 87)  BP: 99/59 (24 Dec 2022 06:09) (83/61 - 105/74)  BP(mean): --  RR: 18 (24 Dec 2022 04:40) (17 - 18)  SpO2: 94% (24 Dec 2022 04:40) (94% - 97%)      12-23-22 @ 07:01  -  12-24-22 @ 07:00  --------------------------------------------------------  IN: 240 mL / OUT: 0 mL / NET: 240 mL                                             --------------------------------------------------------------  LABS:                        10.1   4.80  )-----------( 132      ( 24 Dec 2022 07:25 )             32.2     12-24    141  |  108  |  16  ----------------------------<  89  4.0   |  22  |  0.9    Ca    8.0<L>      24 Dec 2022 07:25  Mg     1.9     12-24    TPro  6.9  /  Alb  4.1  /  TBili  0.4  /  DBili  x   /  AST  28  /  ALT  21  /  AlkPhos  161<H>  12-22          Troponin T, Serum: <0.01 ng/mL (12-23-22 @ 13:09)          CARDIAC MARKERS ( 23 Dec 2022 13:09 )  x     / <0.01 ng/mL / x     / x     / x      CARDIAC MARKERS ( 22 Dec 2022 22:58 )  x     / <0.01 ng/mL / x     / x     / x                                                  -------------------------------------------------------------  RADIOLOGY:  ACC: [id number] EXAM: CT ANGIO ABD PELV (W)AW   ACC: [id number] EXAM: CT ANGIO CHEST AORTA [facility]  PROCEDURE DATE: 12/22/2022  IMPRESSION:  Since 9/13/2022:  1. No significant interval change. 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 No evidence of aortic rupture.  2. Unchanged moderate pericardial effusion. Unchanged moderate left, small right bilateral pleural effusions.  3. No evidence of acute intra-abdominal pathology.  YAIR HIGGINS MD; Attending Radiologist  This document has been electronically signed. Dec 22 2022 11:59PM                                          --------------------------------------------------------------    PHYSICAL EXAM:  CONSTITUTIONAL: Well-appearing and in no apparent distress.    EYES: PERRLA and symmetric, EOMI, No conjunctival injection or pallor. Sclera anicteric.    ENMT: Moist mucous membranes. No external nasal lesions. No gross hearing impairment noted.  	NECK: Supple, symmetric and without tracheal deviation.    RESPIRATORY: No respiratory distress. No obvious use of accessory muscles. Lungs CTAB with no crackling, wheezing, rhonchi or rubs.    CARDIOVASCULAR: Regular rate and rhythm. Normal S1 and S2. Systolic murmur present. No rubs or gallops. Radial pulses 2+. 1+ lower extremity pitting edema bilaterally.    GASTROINTESTINAL: Soft, non-tender to palpation in all quadrants. No palpable masses. No appreciable hernias.    MUSCULOSKELETAL: Examination of all four extremities without obvious misalignment, normal range of motion without pain, no spinal tenderness, normal muscle strength/tone.    SKIN: Erythema in mid-back (with pruritus) and over distal bilateral lower extremities.    NEUROLOGIC: No focal neurologic deficits noted.    PSYCHIATRIC: A+O x 2 (person and place but not time).                                           --------------------------------------------------------------

## 2022-12-25 LAB
ALBUMIN SERPL ELPH-MCNC: 3.2 G/DL — LOW (ref 3.5–5.2)
ALP SERPL-CCNC: 111 U/L — SIGNIFICANT CHANGE UP (ref 30–115)
ALT FLD-CCNC: 12 U/L — SIGNIFICANT CHANGE UP (ref 0–41)
ANION GAP SERPL CALC-SCNC: 10 MMOL/L — SIGNIFICANT CHANGE UP (ref 7–14)
AST SERPL-CCNC: 16 U/L — SIGNIFICANT CHANGE UP (ref 0–41)
BASOPHILS # BLD AUTO: 0.03 K/UL — SIGNIFICANT CHANGE UP (ref 0–0.2)
BASOPHILS NFR BLD AUTO: 0.7 % — SIGNIFICANT CHANGE UP (ref 0–1)
BILIRUB SERPL-MCNC: 0.6 MG/DL — SIGNIFICANT CHANGE UP (ref 0.2–1.2)
BUN SERPL-MCNC: 15 MG/DL — SIGNIFICANT CHANGE UP (ref 10–20)
CALCIUM SERPL-MCNC: 8.2 MG/DL — LOW (ref 8.4–10.4)
CHLORIDE SERPL-SCNC: 109 MMOL/L — SIGNIFICANT CHANGE UP (ref 98–110)
CO2 SERPL-SCNC: 23 MMOL/L — SIGNIFICANT CHANGE UP (ref 17–32)
CREAT SERPL-MCNC: 0.8 MG/DL — SIGNIFICANT CHANGE UP (ref 0.7–1.5)
EGFR: 72 ML/MIN/1.73M2 — SIGNIFICANT CHANGE UP
EOSINOPHIL # BLD AUTO: 0.29 K/UL — SIGNIFICANT CHANGE UP (ref 0–0.7)
EOSINOPHIL NFR BLD AUTO: 6.7 % — SIGNIFICANT CHANGE UP (ref 0–8)
GLUCOSE SERPL-MCNC: 96 MG/DL — SIGNIFICANT CHANGE UP (ref 70–99)
HCT VFR BLD CALC: 30.9 % — LOW (ref 37–47)
HGB BLD-MCNC: 9.9 G/DL — LOW (ref 12–16)
IMM GRANULOCYTES NFR BLD AUTO: 0 % — LOW (ref 0.1–0.3)
LYMPHOCYTES # BLD AUTO: 0.54 K/UL — LOW (ref 1.2–3.4)
LYMPHOCYTES # BLD AUTO: 12.5 % — LOW (ref 20.5–51.1)
MAGNESIUM SERPL-MCNC: 2 MG/DL — SIGNIFICANT CHANGE UP (ref 1.8–2.4)
MCHC RBC-ENTMCNC: 29.2 PG — SIGNIFICANT CHANGE UP (ref 27–31)
MCHC RBC-ENTMCNC: 32 G/DL — SIGNIFICANT CHANGE UP (ref 32–37)
MCV RBC AUTO: 91.2 FL — SIGNIFICANT CHANGE UP (ref 81–99)
MONOCYTES # BLD AUTO: 0.59 K/UL — SIGNIFICANT CHANGE UP (ref 0.1–0.6)
MONOCYTES NFR BLD AUTO: 13.6 % — HIGH (ref 1.7–9.3)
NEUTROPHILS # BLD AUTO: 2.88 K/UL — SIGNIFICANT CHANGE UP (ref 1.4–6.5)
NEUTROPHILS NFR BLD AUTO: 66.5 % — SIGNIFICANT CHANGE UP (ref 42.2–75.2)
NRBC # BLD: 0 /100 WBCS — SIGNIFICANT CHANGE UP (ref 0–0)
PLATELET # BLD AUTO: 127 K/UL — LOW (ref 130–400)
POTASSIUM SERPL-MCNC: 4 MMOL/L — SIGNIFICANT CHANGE UP (ref 3.5–5)
POTASSIUM SERPL-SCNC: 4 MMOL/L — SIGNIFICANT CHANGE UP (ref 3.5–5)
PROT SERPL-MCNC: 5.5 G/DL — LOW (ref 6–8)
RBC # BLD: 3.39 M/UL — LOW (ref 4.2–5.4)
RBC # FLD: 15.8 % — HIGH (ref 11.5–14.5)
SODIUM SERPL-SCNC: 142 MMOL/L — SIGNIFICANT CHANGE UP (ref 135–146)
WBC # BLD: 4.33 K/UL — LOW (ref 4.8–10.8)
WBC # FLD AUTO: 4.33 K/UL — LOW (ref 4.8–10.8)

## 2022-12-25 PROCEDURE — 99222 1ST HOSP IP/OBS MODERATE 55: CPT | Mod: 25

## 2022-12-25 PROCEDURE — 99232 SBSQ HOSP IP/OBS MODERATE 35: CPT

## 2022-12-25 RX ORDER — HYDROMORPHONE HYDROCHLORIDE 2 MG/ML
0.5 INJECTION INTRAMUSCULAR; INTRAVENOUS; SUBCUTANEOUS ONCE
Refills: 0 | Status: DISCONTINUED | OUTPATIENT
Start: 2022-12-25 | End: 2022-12-25

## 2022-12-25 RX ADMIN — Medication 1 APPLICATION(S): at 05:41

## 2022-12-25 RX ADMIN — Medication 12.5 MILLIGRAM(S): at 05:41

## 2022-12-25 RX ADMIN — Medication 5 MILLIGRAM(S): at 22:06

## 2022-12-25 RX ADMIN — Medication 1 APPLICATION(S): at 17:56

## 2022-12-25 RX ADMIN — HYDROMORPHONE HYDROCHLORIDE 0.5 MILLIGRAM(S): 2 INJECTION INTRAMUSCULAR; INTRAVENOUS; SUBCUTANEOUS at 07:05

## 2022-12-25 RX ADMIN — PANTOPRAZOLE SODIUM 40 MILLIGRAM(S): 20 TABLET, DELAYED RELEASE ORAL at 05:41

## 2022-12-25 RX ADMIN — Medication 12.5 MILLIGRAM(S): at 17:55

## 2022-12-25 RX ADMIN — HEPARIN SODIUM 5000 UNIT(S): 5000 INJECTION INTRAVENOUS; SUBCUTANEOUS at 17:55

## 2022-12-25 RX ADMIN — Medication 675 MILLIGRAM(S): at 04:10

## 2022-12-25 RX ADMIN — ATORVASTATIN CALCIUM 20 MILLIGRAM(S): 80 TABLET, FILM COATED ORAL at 22:06

## 2022-12-25 RX ADMIN — HEPARIN SODIUM 5000 UNIT(S): 5000 INJECTION INTRAVENOUS; SUBCUTANEOUS at 05:41

## 2022-12-25 RX ADMIN — Medication 1 APPLICATION(S): at 06:06

## 2022-12-25 NOTE — CONSULT NOTE ADULT - SUBJECTIVE AND OBJECTIVE BOX
HPI:  Case of 87-year-old female with PMHx descending aortic aneurysm, hx of moderate pericardial effusion presents to ED from [facility] for tachycardia and hypotension and appearing pale as per NH paperwork.  Patient alert and oriented in ED.   On questioning, she says that she was feeling fine with no symptoms related to her abnormal vitals.  On ROS, she says that she has chronic mid back pain.  She denies all fall/trauma, chest pain, shortness of breath, abdominal pain, nausea, vomiting, diarrhea, bloody/dark stools, fever/chills, GI or  symptoms    In ED triage, vitals showed BP 70s/50s and tachycardia in the 130s. EKG showing SVT.  Given on liter LR bolus, vitals recovered afterwards  CT angio chest and abdomen done and Since 9/13/2022: 1. No significant interval change. 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 No evidence of aortic rupture. 2. Unchanged moderate pericardial effusion. Unchanged moderate left, small right bilateral pleural effusions. 3. No evidence of acute intra-abdominal pathology.   (23 Dec 2022 01:15)        PAST MEDICAL & SURGICAL HISTORY  Descending thoracic aortic aneurysm    Pericardial effusion    Pleural effusion        FAMILY HISTORY:  FAMILY HISTORY:      SOCIAL HISTORY:  Social History:  Non smoker  Non alcoholic (23 Dec 2022 01:15)      ALLERGIES:  Allergy Status Unknown      MEDICATIONS:  atorvastatin 20 milliGRAM(s) Oral at bedtime  heparin   Injectable 5000 Unit(s) SubCutaneous every 12 hours  hydrocortisone 1% Cream 1 Application(s) Topical two times a day  melatonin 5 milliGRAM(s) Oral at bedtime  metoprolol tartrate 12.5 milliGRAM(s) Oral two times a day  pantoprazole    Tablet 40 milliGRAM(s) Oral before breakfast  petrolatum white Ointment 1 Application(s) Topical two times a day    PRN:      HOME MEDICATIONS:  Home Medications:  labetalol 100 mg oral tablet: 1 tab(s) orally once a day (16 Sep 2022 19:21)  melatonin 5 mg oral tablet: 1 tab(s) orally once a day (at bedtime), As needed, Insomnia (16 Sep 2022 19:21)  pantoprazole 40 mg oral delayed release tablet: 1 tab(s) orally once a day (before a meal) (16 Sep 2022 19:21)      VITALS:   T(F): 98.6 (12-25 @ 14:03), Max: 98.6 (12-25 @ 14:03)  HR: 74 (12-25 @ 14:03) (65 - 150)  BP: 112/70 (12-25 @ 14:03) (72/47 - 135/79)  BP(mean): 63 (12-25 @ 05:30) (63 - 63)  RR: 18 (12-25 @ 14:03) (17 - 20)  SpO2: 94% (12-24 @ 04:40) (94% - 99%)    I&O's Summary      REVIEW OF SYSTEMS:  unable to obtain    PHYSICAL EXAM:  General: Not in distress.    HEENT: EOMI  Cardio: regular, S1, S2. ELSIE 3/6 in aortic area  Pulm: B/L BS.  Decreased air entry b/l bases   Abdomen: Soft, non-tender, non-distended. Normoactive bowel sounds  Extremities: No edema b/l le  Neuro: A&O x 2. No focal deficits    LABS:                        9.9    4.33  )-----------( 127      ( 25 Dec 2022 08:17 )             30.9     12-25    142  |  109  |  15  ----------------------------<  96  4.0   |  23  |  0.8    Ca    8.2<L>      25 Dec 2022 08:17  Mg     2.0     12-25    TPro  5.5<L>  /  Alb  3.2<L>  /  TBili  0.6  /  DBili  x   /  AST  16  /  ALT  12  /  AlkPhos  111  12-25              Troponin trend:        COVID-19 PCR: NotDetec (22 Dec 2022 23:30)  COVID-19 PCR: Detected (12 Sep 2022 19:14)      RADIOLOGY:  -CXR: < from: Xray Chest 1 View- PORTABLE-Urgent (12.22.22 @ 22:56) >  Impression:    Unchanged enlarged cardiomediastinal silhouette (known aortic dissection,   aneurysm).  Unchanged bilateral pleural effusion/opacities.    < end of copied text >      -CTA: < from: CT Angio Chest Aorta w/wo IV Cont (12.22.22 @ 23:36) >    IMPRESSION:  Since 9/13/2022:    1. No significant interval change. Unchanged type B intramural   hematoma/thrombosed aortic dissection within an aneurysmal thoracic aorta   measuring up to 6.6 x 6.4 cm. No evidence of aortic rupture.    2. Unchanged moderate pericardial effusion. Unchanged moderate left,   small right bilateral pleural effusions.    3. No evidence of acute intra-abdominal pathology.    --- End of Report ---    < end of copied text >    -STRESS TEST: N/A  -CATHETERIZATION: N/A     < from: TTE Echo Complete w/o Contrast w/ Doppler (12.24.22 @ 10:31) >   1. Left ventricular ejection fraction, by visual estimation, is 60 to   65%.   2. Normal global left ventricular systolic function.   3. Mildly increased LV wall thickness.   4. Spectral Doppler shows pseudonormal pattern of left ventricular   myocardial filling (Grade II diastolic dysfunction).   5. Moderately enlarged left atrium.   6. Normal right atrial size.   7. Moderate pleural effusion in the left lateral region.   8. Moderate pericardial effusion, appears slightly increased in size   since September 2022. Still, no evidence of cardiac tamponade although   complete Doppler interrogation of mitral and tricuspid flow was not   performed.   9. Mild mitral valve regurgitation.  10. Mild thickening of the anterior and posterior mitral valve leaflets.  11. Moderate mitral annular calcification.  12. Moderate tricuspid regurgitation.  13. Aortic valve thickening with decreased leaflet opening.  14. Mild aortic regurgitation.  15. Moderate aortic stenosis, peak/mean PG 46/30 mmHg, SIMONA 1.1 cm^2.  16. Estimated pulmonary artery systolic pressure is 38.7 mmHg assuming a   right atrial pressure of 8 mmHg, which is consistent with borderline   pulmonary hypertension.    < end of copied text >    -OTHER:  ECG:  SVT likely A Tach

## 2022-12-25 NOTE — PROGRESS NOTE ADULT - ASSESSMENT
87-year-old female with PMHx descending aortic aneurysm, hx of moderate pericardial effusion presents to ED from [facility] for tachycardia and hypotension and appearing pale as per NH paperwork.  In ED triage, vitals showed BP 70s/50s and tachycardia in the 130s.    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 No evidence of aortic rupture. Unchanged moderate pericardial effusion. Unchanged moderate left, small right bilateral pleural effusions.       #Hypotension and tachycardia    #Chronic Moderate Pericardial Effusion   #Moderate left and small right pleural effusion   - repeat TTE shows persistent effusion, no signs of tamponade   - cardio eval   - switched to metoprolol tartrate from Labetalol due to hypotension     #Descending aortic aneurysm- stable  - On CT scan: Unchanged type B intramural hematoma/thrombosed aortic dissection within an aneurysmal thoracic aorta measuring up to 6.6 x 6.4 cm. No evidence of aortic rupture.  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 She is not a candidate for an endovascular repair of the aneurysm. Repair  would require an open operation which carries with it a high risk of cardiopulmonary complications and high risk of paraplegia. Due to her medical comorbidities she is not a suitable candidate for this type of repair. 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

## 2022-12-25 NOTE — CONSULT NOTE ADULT - ASSESSMENT
IMPRESSION  SVT with hypotension on admission, now resolved   Unchanged moderate pericardial effusion, no signs of tamponade  Unchanged Type B Thrombosed Aortic dissection with intramural hematoma and thoracic aortic aneurysm 6.6 X 6.4 cm  Chronic b/l pleural effusions  Mod AS, Mod TR, GIIDD, mild pulm HTN    RECOMMENDATIONS  c/w telemonitoring  TSH and 2 D echo reviewed  increase metoprolol to 25mg BID to prevent further episodes of SVT  pt is not a candidate for aggressive therapies  recall prn

## 2022-12-25 NOTE — PROGRESS NOTE ADULT - SUBJECTIVE AND OBJECTIVE BOX
Pt seen and examined at bedside.         VITAL SIGNS (Last 24 hrs):  T(C): 36.5 (12-25-22 @ 05:30), Max: 36.5 (12-25-22 @ 05:30)  HR: 74 (12-25-22 @ 05:30) (74 - 75)  BP: 107/68 (12-25-22 @ 05:30) (107/68 - 115/73)  RR: 18 (12-25-22 @ 05:30) (18 - 18)  SpO2: --  Wt(kg): --  Daily     Daily     I&O's Summary      PHYSICAL EXAM:  GENERAL: NAD, well-developed  HEAD:  Atraumatic, Normocephalic  EYES: EOMI, PERRLA, conjunctiva and sclera clear  NECK: Supple, No JVD  CHEST/LUNG: Clear to auscultation bilaterally; No wheeze  HEART: Regular rate and rhythm; No murmurs, rubs, or gallops  ABDOMEN: Soft, Nontender, Nondistended; Bowel sounds present  SKIN: No rashes or lesions    Labs Reviewed       CBC Full  -  ( 25 Dec 2022 08:17 )  WBC Count : 4.33 K/uL  Hemoglobin : 9.9 g/dL  Hematocrit : 30.9 %  Platelet Count - Automated : 127 K/uL  Mean Cell Volume : 91.2 fL  Mean Cell Hemoglobin : 29.2 pg  Mean Cell Hemoglobin Concentration : 32.0 g/dL  Auto Neutrophil # : 2.88 K/uL  Auto Lymphocyte # : 0.54 K/uL  Auto Monocyte # : 0.59 K/uL  Auto Eosinophil # : 0.29 K/uL  Auto Basophil # : 0.03 K/uL  Auto Neutrophil % : 66.5 %  Auto Lymphocyte % : 12.5 %  Auto Monocyte % : 13.6 %  Auto Eosinophil % : 6.7 %  Auto Basophil % : 0.7 %    BMP:    12-25 @ 08:17    Blood Urea Nitrogen - 15  Calcium - 8.2  Carbond Dioxide - 23  Chloride - 109  Creatinine - 0.8  Glucose - 96  Potassium - 4.0  Sodium - 142           MEDICATIONS  (STANDING):  atorvastatin 20 milliGRAM(s) Oral at bedtime  heparin   Injectable 5000 Unit(s) SubCutaneous every 12 hours  hydrocortisone 1% Cream 1 Application(s) Topical two times a day  melatonin 5 milliGRAM(s) Oral at bedtime  metoprolol tartrate 12.5 milliGRAM(s) Oral two times a day  pantoprazole    Tablet 40 milliGRAM(s) Oral before breakfast  petrolatum white Ointment 1 Application(s) Topical two times a day    MEDICATIONS  (PRN):

## 2022-12-26 LAB
ALBUMIN SERPL ELPH-MCNC: 3.3 G/DL — LOW (ref 3.5–5.2)
ALP SERPL-CCNC: 119 U/L — HIGH (ref 30–115)
ALT FLD-CCNC: 12 U/L — SIGNIFICANT CHANGE UP (ref 0–41)
ANION GAP SERPL CALC-SCNC: 13 MMOL/L — SIGNIFICANT CHANGE UP (ref 7–14)
AST SERPL-CCNC: 16 U/L — SIGNIFICANT CHANGE UP (ref 0–41)
BASOPHILS # BLD AUTO: 0.03 K/UL — SIGNIFICANT CHANGE UP (ref 0–0.2)
BASOPHILS NFR BLD AUTO: 0.5 % — SIGNIFICANT CHANGE UP (ref 0–1)
BILIRUB SERPL-MCNC: 0.6 MG/DL — SIGNIFICANT CHANGE UP (ref 0.2–1.2)
BUN SERPL-MCNC: 17 MG/DL — SIGNIFICANT CHANGE UP (ref 10–20)
CALCIUM SERPL-MCNC: 8.2 MG/DL — LOW (ref 8.4–10.5)
CHLORIDE SERPL-SCNC: 107 MMOL/L — SIGNIFICANT CHANGE UP (ref 98–110)
CO2 SERPL-SCNC: 21 MMOL/L — SIGNIFICANT CHANGE UP (ref 17–32)
CREAT SERPL-MCNC: 0.8 MG/DL — SIGNIFICANT CHANGE UP (ref 0.7–1.5)
EGFR: 72 ML/MIN/1.73M2 — SIGNIFICANT CHANGE UP
EOSINOPHIL # BLD AUTO: 0.2 K/UL — SIGNIFICANT CHANGE UP (ref 0–0.7)
EOSINOPHIL NFR BLD AUTO: 3.4 % — SIGNIFICANT CHANGE UP (ref 0–8)
GLUCOSE SERPL-MCNC: 96 MG/DL — SIGNIFICANT CHANGE UP (ref 70–99)
HCT VFR BLD CALC: 34.5 % — LOW (ref 37–47)
HGB BLD-MCNC: 10.9 G/DL — LOW (ref 12–16)
IMM GRANULOCYTES NFR BLD AUTO: 0.3 % — SIGNIFICANT CHANGE UP (ref 0.1–0.3)
LYMPHOCYTES # BLD AUTO: 0.59 K/UL — LOW (ref 1.2–3.4)
LYMPHOCYTES # BLD AUTO: 10.1 % — LOW (ref 20.5–51.1)
MAGNESIUM SERPL-MCNC: 1.8 MG/DL — SIGNIFICANT CHANGE UP (ref 1.8–2.4)
MCHC RBC-ENTMCNC: 28.8 PG — SIGNIFICANT CHANGE UP (ref 27–31)
MCHC RBC-ENTMCNC: 31.6 G/DL — LOW (ref 32–37)
MCV RBC AUTO: 91.3 FL — SIGNIFICANT CHANGE UP (ref 81–99)
MONOCYTES # BLD AUTO: 0.7 K/UL — HIGH (ref 0.1–0.6)
MONOCYTES NFR BLD AUTO: 12 % — HIGH (ref 1.7–9.3)
NEUTROPHILS # BLD AUTO: 4.29 K/UL — SIGNIFICANT CHANGE UP (ref 1.4–6.5)
NEUTROPHILS NFR BLD AUTO: 73.7 % — SIGNIFICANT CHANGE UP (ref 42.2–75.2)
NRBC # BLD: 0 /100 WBCS — SIGNIFICANT CHANGE UP (ref 0–0)
PLATELET # BLD AUTO: 134 K/UL — SIGNIFICANT CHANGE UP (ref 130–400)
POTASSIUM SERPL-MCNC: 3.7 MMOL/L — SIGNIFICANT CHANGE UP (ref 3.5–5)
POTASSIUM SERPL-SCNC: 3.7 MMOL/L — SIGNIFICANT CHANGE UP (ref 3.5–5)
PROT SERPL-MCNC: 5.7 G/DL — LOW (ref 6–8)
RBC # BLD: 3.78 M/UL — LOW (ref 4.2–5.4)
RBC # FLD: 15.5 % — HIGH (ref 11.5–14.5)
SODIUM SERPL-SCNC: 141 MMOL/L — SIGNIFICANT CHANGE UP (ref 135–146)
WBC # BLD: 5.83 K/UL — SIGNIFICANT CHANGE UP (ref 4.8–10.8)
WBC # FLD AUTO: 5.83 K/UL — SIGNIFICANT CHANGE UP (ref 4.8–10.8)

## 2022-12-26 PROCEDURE — 99231 SBSQ HOSP IP/OBS SF/LOW 25: CPT

## 2022-12-26 RX ORDER — METOPROLOL TARTRATE 50 MG
25 TABLET ORAL
Refills: 0 | Status: DISCONTINUED | OUTPATIENT
Start: 2022-12-26 | End: 2023-01-01

## 2022-12-26 RX ORDER — ACETAMINOPHEN 500 MG
650 TABLET ORAL EVERY 6 HOURS
Refills: 0 | Status: DISCONTINUED | OUTPATIENT
Start: 2022-12-26 | End: 2023-01-01

## 2022-12-26 RX ADMIN — HEPARIN SODIUM 5000 UNIT(S): 5000 INJECTION INTRAVENOUS; SUBCUTANEOUS at 05:55

## 2022-12-26 RX ADMIN — ATORVASTATIN CALCIUM 20 MILLIGRAM(S): 80 TABLET, FILM COATED ORAL at 21:14

## 2022-12-26 RX ADMIN — HEPARIN SODIUM 5000 UNIT(S): 5000 INJECTION INTRAVENOUS; SUBCUTANEOUS at 18:15

## 2022-12-26 RX ADMIN — Medication 1 APPLICATION(S): at 05:56

## 2022-12-26 RX ADMIN — Medication 5 MILLIGRAM(S): at 21:14

## 2022-12-26 RX ADMIN — Medication 25 MILLIGRAM(S): at 18:15

## 2022-12-26 RX ADMIN — Medication 1 APPLICATION(S): at 18:16

## 2022-12-26 RX ADMIN — Medication 12.5 MILLIGRAM(S): at 05:56

## 2022-12-26 RX ADMIN — Medication 1 APPLICATION(S): at 18:15

## 2022-12-26 NOTE — PROGRESS NOTE ADULT - ASSESSMENT
87-year-old female with PMHx descending aortic aneurysm, hx of moderate pericardial effusion presents to ED from [facility] for tachycardia and hypotension and appearing pale as per NH paperwork.  In ED triage, vitals showed BP 70s/50s and tachycardia in the 130s.    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 No evidence of aortic rupture. Unchanged moderate pericardial effusion. Unchanged moderate left, small right bilateral pleural effusions.       #Hypotension and tachycardia    #Chronic Moderate Pericardial Effusion   #Moderate left and small right pleural effusion   - repeat TTE shows persistent effusion, no signs of tamponade   - cardio eval appreciated   - switched to metoprolol tartrate from Labetalol due to hypotension     #Descending aortic aneurysm- stable  - On CT scan: Unchanged type B intramural hematoma/thrombosed aortic dissection within an aneurysmal thoracic aorta measuring up to 6.6 x 6.4 cm. No evidence of aortic rupture.  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 She is not a candidate for an endovascular repair of the aneurysm. Repair  would require an open operation which carries with it a high risk of cardiopulmonary complications and high risk of paraplegia. Due to her medical comorbidities she is not a suitable candidate for this type of repair. There is no planned intervention at this time  - c/w BB, c/w Lipitor     #Lower limb edema with erythema  - Suggestive of chronic venous insufficiency with stasis dermatitis    #DVT prophylaxis: Heparin 5000 BID     Dispo: placement    Dispo: SNF

## 2022-12-26 NOTE — PROGRESS NOTE ADULT - SUBJECTIVE AND OBJECTIVE BOX
SULEMA POZO 87y Female  MRN#: [id number]   Hospital Day: 3d    HPI:  Case of 87-year-old female with PMHx descending aortic aneurysm, hx of moderate pericardial effusion presents to ED from [facility] for tachycardia and hypotension and appearing pale as per NH paperwork.  Patient alert and oriented in ED.   On questioning, she says that she was feeling fine with no symptoms related to her abnormal vitals.  On ROS, she says that she has chronic mid back pain.  She denies all fall/trauma, chest pain, shortness of breath, abdominal pain, nausea, vomiting, diarrhea, bloody/dark stools, fever/chills, GI or  symptoms    In ED triage, vitals showed BP 70s/50s and tachycardia in the 130s. EKG showing SVT.  Given on liter LR bolus, vitals recovered afterwards  CT angio chest and abdomen done and Since 9/13/2022: 1. No significant interval change. 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 No evidence of aortic rupture. 2. Unchanged moderate pericardial effusion. Unchanged moderate left, small right bilateral pleural effusions. 3. No evidence of acute intra-abdominal pathology.   (23 Dec 2022 01:15)      SUBJECTIVE  Patient is a 87y old Female who presents with a chief complaint of Tachycardia and Hypotension (25 Dec 2022 16:19)  Currently admitted to medicine with the primary diagnosis of Hypotension      INTERVAL HPI AND OVERNIGHT EVENTS:  Patient was examined and seen at bedside. She complained of back pain in the left upper back area. No itching.     OBJECTIVE  PAST MEDICAL & SURGICAL HISTORY  Descending thoracic aortic aneurysm    Pericardial effusion    Pleural effusion      ALLERGIES:  Allergy Status Unknown    MEDICATIONS:  STANDING MEDICATIONS  atorvastatin 20 milliGRAM(s) Oral at bedtime  heparin   Injectable 5000 Unit(s) SubCutaneous every 12 hours  hydrocortisone 1% Cream 1 Application(s) Topical two times a day  melatonin 5 milliGRAM(s) Oral at bedtime  metoprolol tartrate 12.5 milliGRAM(s) Oral two times a day  pantoprazole    Tablet 40 milliGRAM(s) Oral before breakfast  petrolatum white Ointment 1 Application(s) Topical two times a day    PRN MEDICATIONS  acetaminophen     Tablet .. 650 milliGRAM(s) Oral every 6 hours PRN      VITAL SIGNS: Last 24 Hours  T(C): 36.7 (26 Dec 2022 04:51), Max: 37 (25 Dec 2022 14:03)  T(F): 98.1 (26 Dec 2022 04:51), Max: 98.6 (25 Dec 2022 14:03)  HR: 75 (26 Dec 2022 04:51) (74 - 75)  BP: 121/77 (26 Dec 2022 04:51) (112/70 - 123/82)  BP(mean): --  RR: 18 (26 Dec 2022 04:51) (18 - 18)  SpO2: 94% (25 Dec 2022 17:28) (94% - 94%)    LABS:                        10.9   5.83  )-----------( 134      ( 26 Dec 2022 06:14 )             34.5     12-26    141  |  107  |  17  ----------------------------<  96  3.7   |  21  |  0.8    Ca    8.2<L>      26 Dec 2022 06:14  Mg     1.8     12-26    TPro  5.7<L>  /  Alb  3.3<L>  /  TBili  0.6  /  DBili  x   /  AST  16  /  ALT  12  /  AlkPhos  119<H>  12-26      PHYSICAL EXAM:  CONSTITUTIONAL: No acute distress, well-developed, well-groomed, AAOx3  HEAD: Atraumatic, normocephalic  EYES: EOM intact, PERRLA, conjunctiva and sclera clear  ENT: Supple, no masses, no thyromegaly, no bruits, no JVD; moist mucous membranes  PULMONARY: Clear to auscultation bilaterally; no wheezes, rales, or rhonchi  CARDIOVASCULAR: Regular rate and rhythm; no murmurs, rubs, or gallops  GASTROINTESTINAL: Soft, non-tender, non-distended; bowel sounds present  MUSCULOSKELETAL: 2+ peripheral pulses; no clubbing, no cyanosis, no edema  Left upper back papular rash localized in one area does not cross to other side   NEUROLOGY: non-focal, no motor or sensory deficit       ASSESSMENT & PLAN  87-year-old female with PMHx descending aortic aneurysm, hx of moderate pericardial effusion presents to ED from [facility] for tachycardia and hypotension and appearing pale as per NH paperwork.  In ED triage, vitals showed BP 70s/50s and tachycardia in the 130s.    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 No evidence of aortic rupture. Unchanged moderate pericardial effusion. Unchanged moderate left, small right bilateral pleural effusions.       #Hypotension and tachycardia- resolved    #Chronic Moderate Pericardial Effusion   #Moderate left and small right pleural effusion   - repeat TTE shows persistent effusion, no signs of tamponade, mod AS, EF 60%, GIIDD   - switched to metoprolol tartrate from Labetalol due to hypotension   - cardio eval--> not candidate for aggressive therapies, can increase metoprolol to 25mg BID to prevent SVT     #Rash on left upper back   - concern for shingles  - painful, not itchy        #Descending aortic aneurysm- stable  - On CT scan: Unchanged type B intramural hematoma/thrombosed aortic dissection within an aneurysmal thoracic aorta measuring up to 6.6 x 6.4 cm. No evidence of aortic rupture.  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 She is not a candidate for an endovascular repair of the aneurysm. Repair  would require an open operation which carries with it a high risk of cardiopulmonary complications and high risk of paraplegia. Due to her medical comorbidities she is not a suitable candidate for this type of repair. There is no planned intervention at this time  - c/w BB, c/w lipitor   - target SBP < 140 mmhg     #Lower limb edema with erythema  - Suggestive of chronic venous insufficiency with stasis dermatitis    #DVT prophylaxis: Heparin 5000 BID   # Diet: DASH   # Dispo: [facility], pending auth          SULEMA POZO 87y Female  MRN#: [id number]   Hospital Day: 3d    HPI:  Case of 87-year-old female with PMHx descending aortic aneurysm, hx of moderate pericardial effusion presents to ED from [facility] for tachycardia and hypotension and appearing pale as per NH paperwork.  Patient alert and oriented in ED.   On questioning, she says that she was feeling fine with no symptoms related to her abnormal vitals.  On ROS, she says that she has chronic mid back pain.  She denies all fall/trauma, chest pain, shortness of breath, abdominal pain, nausea, vomiting, diarrhea, bloody/dark stools, fever/chills, GI or  symptoms    In ED triage, vitals showed BP 70s/50s and tachycardia in the 130s. EKG showing SVT.  Given on liter LR bolus, vitals recovered afterwards  CT angio chest and abdomen done and Since 9/13/2022: 1. No significant interval change. 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 No evidence of aortic rupture. 2. Unchanged moderate pericardial effusion. Unchanged moderate left, small right bilateral pleural effusions. 3. No evidence of acute intra-abdominal pathology.   (23 Dec 2022 01:15)      SUBJECTIVE  Patient is a 87y old Female who presents with a chief complaint of Tachycardia and Hypotension (25 Dec 2022 16:19)  Currently admitted to medicine with the primary diagnosis of Hypotension      INTERVAL HPI AND OVERNIGHT EVENTS:  Patient was examined and seen at bedside. She complained of back pain in the left upper back area. No itching.     OBJECTIVE  PAST MEDICAL & SURGICAL HISTORY  Descending thoracic aortic aneurysm    Pericardial effusion    Pleural effusion      ALLERGIES:  Allergy Status Unknown    MEDICATIONS:  STANDING MEDICATIONS  atorvastatin 20 milliGRAM(s) Oral at bedtime  heparin   Injectable 5000 Unit(s) SubCutaneous every 12 hours  hydrocortisone 1% Cream 1 Application(s) Topical two times a day  melatonin 5 milliGRAM(s) Oral at bedtime  metoprolol tartrate 12.5 milliGRAM(s) Oral two times a day  pantoprazole    Tablet 40 milliGRAM(s) Oral before breakfast  petrolatum white Ointment 1 Application(s) Topical two times a day    PRN MEDICATIONS  acetaminophen     Tablet .. 650 milliGRAM(s) Oral every 6 hours PRN      VITAL SIGNS: Last 24 Hours  T(C): 36.7 (26 Dec 2022 04:51), Max: 37 (25 Dec 2022 14:03)  T(F): 98.1 (26 Dec 2022 04:51), Max: 98.6 (25 Dec 2022 14:03)  HR: 75 (26 Dec 2022 04:51) (74 - 75)  BP: 121/77 (26 Dec 2022 04:51) (112/70 - 123/82)  BP(mean): --  RR: 18 (26 Dec 2022 04:51) (18 - 18)  SpO2: 94% (25 Dec 2022 17:28) (94% - 94%)    LABS:                        10.9   5.83  )-----------( 134      ( 26 Dec 2022 06:14 )             34.5     12-26    141  |  107  |  17  ----------------------------<  96  3.7   |  21  |  0.8    Ca    8.2<L>      26 Dec 2022 06:14  Mg     1.8     12-26    TPro  5.7<L>  /  Alb  3.3<L>  /  TBili  0.6  /  DBili  x   /  AST  16  /  ALT  12  /  AlkPhos  119<H>  12-26      PHYSICAL EXAM:  CONSTITUTIONAL: No acute distress, well-developed, well-groomed, AAOx3  HEAD: Atraumatic, normocephalic  EYES: EOM intact, PERRLA, conjunctiva and sclera clear  ENT: Supple, no masses, no thyromegaly, no bruits, no JVD; moist mucous membranes  PULMONARY: Clear to auscultation bilaterally; no wheezes, rales, or rhonchi  CARDIOVASCULAR: Regular rate and rhythm; no murmurs, rubs, or gallops  GASTROINTESTINAL: Soft, non-tender, non-distended; bowel sounds present  MUSCULOSKELETAL: 2+ peripheral pulses; no clubbing, no cyanosis, no edema  Left upper back papular rash localized in one area does not cross to other side   NEUROLOGY: non-focal, no motor or sensory deficit       ASSESSMENT & PLAN  87-year-old female with PMHx descending aortic aneurysm, hx of moderate pericardial effusion presents to ED from [facility] for tachycardia and hypotension and appearing pale as per NH paperwork.  In ED triage, vitals showed BP 70s/50s and tachycardia in the 130s.    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 No evidence of aortic rupture. Unchanged moderate pericardial effusion. Unchanged moderate left, small right bilateral pleural effusions.       #Hypotension and tachycardia- resolved    #Chronic Moderate Pericardial Effusion   #Moderate left and small right pleural effusion   - repeat TTE shows persistent effusion, no signs of tamponade, mod AS, EF 60%, GIIDD   - switched to metoprolol tartrate from Labetalol due to hypotension   - cardio eval--> not candidate for aggressive therapies, can increase metoprolol to 25mg BID to prevent SVT     #Rash on left upper back   - concern for shingles  - painful, not itchy    - started on valtrex       #Descending aortic aneurysm- stable  - On CT scan: Unchanged type B intramural hematoma/thrombosed aortic dissection within an aneurysmal thoracic aorta measuring up to 6.6 x 6.4 cm. No evidence of aortic rupture.  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 She is not a candidate for an endovascular repair of the aneurysm. Repair  would require an open operation which carries with it a high risk of cardiopulmonary complications and high risk of paraplegia. Due to her medical comorbidities she is not a suitable candidate for this type of repair. There is no planned intervention at this time  - c/w BB, c/w lipitor   - target SBP < 140 mmhg     #Lower limb edema with erythema  - Suggestive of chronic venous insufficiency with stasis dermatitis    #DVT prophylaxis: Heparin 5000 BID   # Diet: DASH   # Dispo: [facility], pending auth

## 2022-12-26 NOTE — PROGRESS NOTE ADULT - SUBJECTIVE AND OBJECTIVE BOX
Pt seen and examined at bedside.         VITAL SIGNS (Last 24 hrs):  T(C): 36.7 (12-26-22 @ 04:51), Max: 37 (12-25-22 @ 14:03)  HR: 75 (12-26-22 @ 04:51) (74 - 75)  BP: 121/77 (12-26-22 @ 04:51) (112/70 - 123/82)  RR: 18 (12-26-22 @ 04:51) (18 - 18)  SpO2: 94% (12-25-22 @ 17:28) (94% - 94%)  Wt(kg): --  Daily     Daily     I&O's Summary    25 Dec 2022 07:01  -  26 Dec 2022 07:00  --------------------------------------------------------  IN: 520 mL / OUT: 0 mL / NET: 520 mL    26 Dec 2022 07:01  -  26 Dec 2022 12:57  --------------------------------------------------------  IN: 0 mL / OUT: 200 mL / NET: -200 mL        PHYSICAL EXAM:  GENERAL: NAD, well-developed  HEAD:  Atraumatic, Normocephalic  EYES: EOMI, PERRLA, conjunctiva and sclera clear  NECK: Supple, No JVD  CHEST/LUNG: Clear to auscultation bilaterally; No wheeze  HEART: Regular rate and rhythm; No murmurs, rubs, or gallops  ABDOMEN: Soft, Nontender, Nondistended; Bowel sounds present  EXTREMITIES:  2+ Peripheral Pulses, No clubbing, cyanosis, or edema  PSYCH: AAOx3  NEUROLOGY: non-focal  SKIN: No rashes or lesions    Labs Reviewed  Spoke to patient in regards to abnormal labs.    CBC Full  -  ( 26 Dec 2022 06:14 )  WBC Count : 5.83 K/uL  Hemoglobin : 10.9 g/dL  Hematocrit : 34.5 %  Platelet Count - Automated : 134 K/uL  Mean Cell Volume : 91.3 fL  Mean Cell Hemoglobin : 28.8 pg  Mean Cell Hemoglobin Concentration : 31.6 g/dL  Auto Neutrophil # : 4.29 K/uL  Auto Lymphocyte # : 0.59 K/uL  Auto Monocyte # : 0.70 K/uL  Auto Eosinophil # : 0.20 K/uL  Auto Basophil # : 0.03 K/uL  Auto Neutrophil % : 73.7 %  Auto Lymphocyte % : 10.1 %  Auto Monocyte % : 12.0 %  Auto Eosinophil % : 3.4 %  Auto Basophil % : 0.5 %    BMP:    12-26 @ 06:14    Blood Urea Nitrogen - 17  Calcium - 8.2  Carbond Dioxide - 21  Chloride - 107  Creatinine - 0.8  Glucose - 96  Potassium - 3.7  Sodium - 141                 MEDICATIONS  (STANDING):  atorvastatin 20 milliGRAM(s) Oral at bedtime  heparin   Injectable 5000 Unit(s) SubCutaneous every 12 hours  hydrocortisone 1% Cream 1 Application(s) Topical two times a day  melatonin 5 milliGRAM(s) Oral at bedtime  metoprolol tartrate 25 milliGRAM(s) Oral two times a day  pantoprazole    Tablet 40 milliGRAM(s) Oral before breakfast  petrolatum white Ointment 1 Application(s) Topical two times a day    MEDICATIONS  (PRN):  acetaminophen     Tablet .. 650 milliGRAM(s) Oral every 6 hours PRN Mild Pain (1 - 3), Moderate Pain (4 - 6)

## 2022-12-27 LAB
ALBUMIN SERPL ELPH-MCNC: 3.2 G/DL — LOW (ref 3.5–5.2)
ALP SERPL-CCNC: 115 U/L — SIGNIFICANT CHANGE UP (ref 30–115)
ALT FLD-CCNC: 11 U/L — SIGNIFICANT CHANGE UP (ref 0–41)
ANION GAP SERPL CALC-SCNC: 10 MMOL/L — SIGNIFICANT CHANGE UP (ref 7–14)
AST SERPL-CCNC: 16 U/L — SIGNIFICANT CHANGE UP (ref 0–41)
BASOPHILS # BLD AUTO: 0.03 K/UL — SIGNIFICANT CHANGE UP (ref 0–0.2)
BASOPHILS NFR BLD AUTO: 0.5 % — SIGNIFICANT CHANGE UP (ref 0–1)
BILIRUB SERPL-MCNC: 0.6 MG/DL — SIGNIFICANT CHANGE UP (ref 0.2–1.2)
BUN SERPL-MCNC: 12 MG/DL — SIGNIFICANT CHANGE UP (ref 10–20)
CALCIUM SERPL-MCNC: 7.9 MG/DL — LOW (ref 8.4–10.5)
CHLORIDE SERPL-SCNC: 105 MMOL/L — SIGNIFICANT CHANGE UP (ref 98–110)
CO2 SERPL-SCNC: 22 MMOL/L — SIGNIFICANT CHANGE UP (ref 17–32)
CREAT SERPL-MCNC: 0.7 MG/DL — SIGNIFICANT CHANGE UP (ref 0.7–1.5)
EGFR: 85 ML/MIN/1.73M2 — SIGNIFICANT CHANGE UP
EOSINOPHIL # BLD AUTO: 0.1 K/UL — SIGNIFICANT CHANGE UP (ref 0–0.7)
EOSINOPHIL NFR BLD AUTO: 1.7 % — SIGNIFICANT CHANGE UP (ref 0–8)
GLUCOSE SERPL-MCNC: 88 MG/DL — SIGNIFICANT CHANGE UP (ref 70–99)
HCT VFR BLD CALC: 33 % — LOW (ref 37–47)
HGB BLD-MCNC: 10.5 G/DL — LOW (ref 12–16)
IMM GRANULOCYTES NFR BLD AUTO: 0.3 % — SIGNIFICANT CHANGE UP (ref 0.1–0.3)
LYMPHOCYTES # BLD AUTO: 0.59 K/UL — LOW (ref 1.2–3.4)
LYMPHOCYTES # BLD AUTO: 10.1 % — LOW (ref 20.5–51.1)
MCHC RBC-ENTMCNC: 28.9 PG — SIGNIFICANT CHANGE UP (ref 27–31)
MCHC RBC-ENTMCNC: 31.8 G/DL — LOW (ref 32–37)
MCV RBC AUTO: 90.9 FL — SIGNIFICANT CHANGE UP (ref 81–99)
MONOCYTES # BLD AUTO: 0.71 K/UL — HIGH (ref 0.1–0.6)
MONOCYTES NFR BLD AUTO: 12.1 % — HIGH (ref 1.7–9.3)
NEUTROPHILS # BLD AUTO: 4.4 K/UL — SIGNIFICANT CHANGE UP (ref 1.4–6.5)
NEUTROPHILS NFR BLD AUTO: 75.3 % — HIGH (ref 42.2–75.2)
NRBC # BLD: 0 /100 WBCS — SIGNIFICANT CHANGE UP (ref 0–0)
PLATELET # BLD AUTO: 123 K/UL — LOW (ref 130–400)
POTASSIUM SERPL-MCNC: 3.8 MMOL/L — SIGNIFICANT CHANGE UP (ref 3.5–5)
POTASSIUM SERPL-SCNC: 3.8 MMOL/L — SIGNIFICANT CHANGE UP (ref 3.5–5)
PROT SERPL-MCNC: 5.8 G/DL — LOW (ref 6–8)
RBC # BLD: 3.63 M/UL — LOW (ref 4.2–5.4)
RBC # FLD: 15.5 % — HIGH (ref 11.5–14.5)
SARS-COV-2 RNA SPEC QL NAA+PROBE: DETECTED
SODIUM SERPL-SCNC: 137 MMOL/L — SIGNIFICANT CHANGE UP (ref 135–146)
WBC # BLD: 5.85 K/UL — SIGNIFICANT CHANGE UP (ref 4.8–10.8)
WBC # FLD AUTO: 5.85 K/UL — SIGNIFICANT CHANGE UP (ref 4.8–10.8)

## 2022-12-27 PROCEDURE — 99232 SBSQ HOSP IP/OBS MODERATE 35: CPT

## 2022-12-27 RX ORDER — VALACYCLOVIR 500 MG/1
1000 TABLET, FILM COATED ORAL THREE TIMES A DAY
Refills: 0 | Status: DISCONTINUED | OUTPATIENT
Start: 2022-12-27 | End: 2022-12-29

## 2022-12-27 RX ADMIN — Medication 1 APPLICATION(S): at 05:36

## 2022-12-27 RX ADMIN — Medication 25 MILLIGRAM(S): at 18:07

## 2022-12-27 RX ADMIN — Medication 5 MILLIGRAM(S): at 22:45

## 2022-12-27 RX ADMIN — Medication 650 MILLIGRAM(S): at 13:20

## 2022-12-27 RX ADMIN — PANTOPRAZOLE SODIUM 40 MILLIGRAM(S): 20 TABLET, DELAYED RELEASE ORAL at 05:36

## 2022-12-27 RX ADMIN — VALACYCLOVIR 1000 MILLIGRAM(S): 500 TABLET, FILM COATED ORAL at 13:19

## 2022-12-27 RX ADMIN — HEPARIN SODIUM 5000 UNIT(S): 5000 INJECTION INTRAVENOUS; SUBCUTANEOUS at 18:07

## 2022-12-27 RX ADMIN — HEPARIN SODIUM 5000 UNIT(S): 5000 INJECTION INTRAVENOUS; SUBCUTANEOUS at 05:35

## 2022-12-27 RX ADMIN — VALACYCLOVIR 1000 MILLIGRAM(S): 500 TABLET, FILM COATED ORAL at 22:45

## 2022-12-27 RX ADMIN — Medication 1 APPLICATION(S): at 18:45

## 2022-12-27 RX ADMIN — ATORVASTATIN CALCIUM 20 MILLIGRAM(S): 80 TABLET, FILM COATED ORAL at 22:45

## 2022-12-27 RX ADMIN — Medication 25 MILLIGRAM(S): at 05:36

## 2022-12-27 NOTE — PROGRESS NOTE ADULT - SUBJECTIVE AND OBJECTIVE BOX
SULEMA POZO 87y Female  MRN#: [id number]   Hospital Day: 4d    HPI:  Case of 87-year-old female with PMHx descending aortic aneurysm, hx of moderate pericardial effusion presents to ED from [facility] for tachycardia and hypotension and appearing pale as per NH paperwork.  Patient alert and oriented in ED.   On questioning, she says that she was feeling fine with no symptoms related to her abnormal vitals.  On ROS, she says that she has chronic mid back pain.  She denies all fall/trauma, chest pain, shortness of breath, abdominal pain, nausea, vomiting, diarrhea, bloody/dark stools, fever/chills, GI or  symptoms    In ED triage, vitals showed BP 70s/50s and tachycardia in the 130s. EKG showing SVT.  Given on liter LR bolus, vitals recovered afterwards  CT angio chest and abdomen done and Since 9/13/2022: 1. No significant interval change. 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 No evidence of aortic rupture. 2. Unchanged moderate pericardial effusion. Unchanged moderate left, small right bilateral pleural effusions. 3. No evidence of acute intra-abdominal pathology.   (23 Dec 2022 01:15)      SUBJECTIVE  Patient is a 87y old Female who presents with a chief complaint of Tachycardia and Hypotension (26 Dec 2022 12:57)  Currently admitted to medicine with the primary diagnosis of Hypotension      INTERVAL HPI AND OVERNIGHT EVENTS:  Patient was examined and seen at bedside. This morning she is resting comfortably in bed and reports no issues or overnight events.    REVIEW OF SYMPTOMS:  CONSTITUTIONAL: No weakness, fevers or chills; No headaches  EYES: No visual changes, eye pain, or discharge  ENT: No vertigo; No ear pain or change in hearing; No sore throat or difficulty swallowing  NECK: No pain or stiffness  RESPIRATORY: No cough, wheezing, or hemoptysis; No shortness of breath  CARDIOVASCULAR: No chest pain or palpitations  GASTROINTESTINAL: No abdominal or epigastric pain; No nausea, vomiting, or hematemesis; No diarrhea or constipation; No melena or hematochezia  GENITOURINARY: No dysuria, frequency or hematuria  MUSCULOSKELETAL: No joint pain, no muscle pain, no weakness  NEUROLOGICAL: No numbness or weakness  SKIN: No itching or rashes    OBJECTIVE  PAST MEDICAL & SURGICAL HISTORY  Descending thoracic aortic aneurysm    Pericardial effusion    Pleural effusion      ALLERGIES:  Allergy Status Unknown    MEDICATIONS:  STANDING MEDICATIONS  atorvastatin 20 milliGRAM(s) Oral at bedtime  heparin   Injectable 5000 Unit(s) SubCutaneous every 12 hours  hydrocortisone 1% Cream 1 Application(s) Topical two times a day  melatonin 5 milliGRAM(s) Oral at bedtime  metoprolol tartrate 25 milliGRAM(s) Oral two times a day  pantoprazole    Tablet 40 milliGRAM(s) Oral before breakfast  petrolatum white Ointment 1 Application(s) Topical two times a day  valACYclovir 1000 milliGRAM(s) Oral three times a day    PRN MEDICATIONS  acetaminophen     Tablet .. 650 milliGRAM(s) Oral every 6 hours PRN      VITAL SIGNS: Last 24 Hours  T(C): 36.3 (27 Dec 2022 05:18), Max: 36.4 (26 Dec 2022 14:09)  T(F): 97.3 (27 Dec 2022 05:18), Max: 97.5 (26 Dec 2022 14:09)  HR: 72 (27 Dec 2022 05:18) (70 - 76)  BP: 128/76 (27 Dec 2022 05:18) (128/76 - 132/84)  BP(mean): --  RR: 18 (27 Dec 2022 05:18) (18 - 18)  SpO2: --    LABS:                        10.5   5.85  )-----------( 123      ( 27 Dec 2022 07:11 )             33.0     12-27    137  |  105  |  12  ----------------------------<  88  3.8   |  22  |  0.7    Ca    7.9<L>      27 Dec 2022 07:11  Mg     1.8     12-26    TPro  5.8<L>  /  Alb  3.2<L>  /  TBili  0.6  /  DBili  x   /  AST  16  /  ALT  11  /  AlkPhos  115  12-27                  RADIOLOGY:      PHYSICAL EXAM:  CONSTITUTIONAL: No acute distress, well-developed, well-groomed, AAOx3  HEAD: Atraumatic, normocephalic  EYES: EOM intact, PERRLA, conjunctiva and sclera clear  ENT: Supple, no masses, no thyromegaly, no bruits, no JVD; moist mucous membranes  PULMONARY: Clear to auscultation bilaterally; no wheezes, rales, or rhonchi  CARDIOVASCULAR: Regular rate and rhythm; no murmurs, rubs, or gallops  GASTROINTESTINAL: Soft, non-tender, non-distended; bowel sounds present  MUSCULOSKELETAL: 2+ peripheral pulses; no clubbing, no cyanosis, no edema  NEUROLOGY: non-focal  SKIN: No rashes or lesions; warm and dry    ASSESSMENT & PLAN  #    PAST MEDICAL & SURGICAL HISTORY:  Descending thoracic aortic aneurysm      Pericardial effusion      Pleural effusion          #Misc  - DVT Prophylaxis:  - Diet:  - GI Prophylaxis:  - Activity:  - IV Fluids:  - Code Status:    Dispo: SULEMA POZO 87y Female  MRN#: [id number]   Hospital Day: 4d    HPI:  Case of 87-year-old female with PMHx descending aortic aneurysm, hx of moderate pericardial effusion presents to ED from [facility] for tachycardia and hypotension and appearing pale as per NH paperwork.  Patient alert and oriented in ED.   On questioning, she says that she was feeling fine with no symptoms related to her abnormal vitals.  On ROS, she says that she has chronic mid back pain.  She denies all fall/trauma, chest pain, shortness of breath, abdominal pain, nausea, vomiting, diarrhea, bloody/dark stools, fever/chills, GI or  symptoms    In ED triage, vitals showed BP 70s/50s and tachycardia in the 130s. EKG showing SVT.  Given on liter LR bolus, vitals recovered afterwards  CT angio chest and abdomen done and Since 9/13/2022: 1. No significant interval change. 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 No evidence of aortic rupture. 2. Unchanged moderate pericardial effusion. Unchanged moderate left, small right bilateral pleural effusions. 3. No evidence of acute intra-abdominal pathology.   (23 Dec 2022 01:15)      SUBJECTIVE  Patient is a 87y old Female who presents with a chief complaint of Tachycardia and Hypotension (26 Dec 2022 12:57)  Currently admitted to medicine with the primary diagnosis of Hypotension      INTERVAL HPI AND OVERNIGHT EVENTS:  Patient was examined and seen at bedside. This morning she is resting comfortably in bed and reports no issues or overnight events.      OBJECTIVE  PAST MEDICAL & SURGICAL HISTORY  Descending thoracic aortic aneurysm    Pericardial effusion    Pleural effusion      ALLERGIES:  Allergy Status Unknown    MEDICATIONS:  STANDING MEDICATIONS  atorvastatin 20 milliGRAM(s) Oral at bedtime  heparin   Injectable 5000 Unit(s) SubCutaneous every 12 hours  hydrocortisone 1% Cream 1 Application(s) Topical two times a day  melatonin 5 milliGRAM(s) Oral at bedtime  metoprolol tartrate 25 milliGRAM(s) Oral two times a day  pantoprazole    Tablet 40 milliGRAM(s) Oral before breakfast  petrolatum white Ointment 1 Application(s) Topical two times a day  valACYclovir 1000 milliGRAM(s) Oral three times a day    PRN MEDICATIONS  acetaminophen     Tablet .. 650 milliGRAM(s) Oral every 6 hours PRN      VITAL SIGNS: Last 24 Hours  T(C): 36.3 (27 Dec 2022 05:18), Max: 36.4 (26 Dec 2022 14:09)  T(F): 97.3 (27 Dec 2022 05:18), Max: 97.5 (26 Dec 2022 14:09)  HR: 72 (27 Dec 2022 05:18) (70 - 76)  BP: 128/76 (27 Dec 2022 05:18) (128/76 - 132/84)  BP(mean): --  RR: 18 (27 Dec 2022 05:18) (18 - 18)  SpO2: --    LABS:                        10.5   5.85  )-----------( 123      ( 27 Dec 2022 07:11 )             33.0     12-27    137  |  105  |  12  ----------------------------<  88  3.8   |  22  |  0.7    Ca    7.9<L>      27 Dec 2022 07:11  Mg     1.8     12-26    TPro  5.8<L>  /  Alb  3.2<L>  /  TBili  0.6  /  DBili  x   /  AST  16  /  ALT  11  /  AlkPhos  115  12-27        PHYSICAL EXAM:  CONSTITUTIONAL: No acute distress, well-developed, well-groomed, AAOx3  HEAD: Atraumatic, normocephalic  EYES: EOM intact, PERRLA, conjunctiva and sclera clear  ENT: Supple, no masses, no thyromegaly, no bruits, no JVD; moist mucous membranes  PULMONARY: Clear to auscultation bilaterally; no wheezes, rales, or rhonchi  CARDIOVASCULAR: Regular rate and rhythm; no murmurs, rubs, or gallops  GASTROINTESTINAL: Soft, non-tender, non-distended; bowel sounds present  MUSCULOSKELETAL: 2+ peripheral pulses; no clubbing, no cyanosis, no edema  Left upper back papular rash localized in one area does not cross to other side still present   NEUROLOGY: non-focal, no motor or sensory deficit     ASSESSMENT & PLAN  87-year-old female with PMHx descending aortic aneurysm, hx of moderate pericardial effusion presents to ED from [facility] for tachycardia and hypotension and appearing pale as per NH paperwork.  In ED triage, vitals showed BP 70s/50s and tachycardia in the 130s.    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 No evidence of aortic rupture. Unchanged moderate pericardial effusion. Unchanged moderate left, small right bilateral pleural effusions.       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 No evidence of aortic rupture.  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 She is not a candidate for an endovascular repair of the aneurysm. Repair  would require an open operation which carries with it a high risk of cardiopulmonary complications and high risk of paraplegia. Due to her medical comorbidities she is not a suitable candidate for this type of repair. There is no planned intervention at this time  - c/w BB, c/w lipitor   - target SBP < 140 mmhg     #Lower limb edema with erythema  - Suggestive of chronic venous insufficiency with stasis dermatitis    #DVT prophylaxis: Heparin 5000 BID   # Diet: DASH   # Dispo: [facility], pending auth

## 2022-12-27 NOTE — PHYSICAL THERAPY INITIAL EVALUATION ADULT - SPECIFY REASON(S)
PT attempted to see the patient for PT eval. Patient declined participating in PT at this time. Patient verbalized that  told me that I am on bedrest. PT explained the patient that she was cleared b

## 2022-12-28 LAB
ANION GAP SERPL CALC-SCNC: 6 MMOL/L — LOW (ref 7–14)
BASOPHILS # BLD AUTO: 0.04 K/UL — SIGNIFICANT CHANGE UP (ref 0–0.2)
BASOPHILS NFR BLD AUTO: 0.8 % — SIGNIFICANT CHANGE UP (ref 0–1)
BUN SERPL-MCNC: 15 MG/DL — SIGNIFICANT CHANGE UP (ref 10–20)
CALCIUM SERPL-MCNC: 8.2 MG/DL — LOW (ref 8.4–10.4)
CHLORIDE SERPL-SCNC: 107 MMOL/L — SIGNIFICANT CHANGE UP (ref 98–110)
CO2 SERPL-SCNC: 25 MMOL/L — SIGNIFICANT CHANGE UP (ref 17–32)
CREAT SERPL-MCNC: 0.8 MG/DL — SIGNIFICANT CHANGE UP (ref 0.7–1.5)
EGFR: 72 ML/MIN/1.73M2 — SIGNIFICANT CHANGE UP
EOSINOPHIL # BLD AUTO: 0.19 K/UL — SIGNIFICANT CHANGE UP (ref 0–0.7)
EOSINOPHIL NFR BLD AUTO: 3.7 % — SIGNIFICANT CHANGE UP (ref 0–8)
GLUCOSE SERPL-MCNC: 91 MG/DL — SIGNIFICANT CHANGE UP (ref 70–99)
HCT VFR BLD CALC: 34.3 % — LOW (ref 37–47)
HGB BLD-MCNC: 11.2 G/DL — LOW (ref 12–16)
IMM GRANULOCYTES NFR BLD AUTO: 0.2 % — SIGNIFICANT CHANGE UP (ref 0.1–0.3)
LYMPHOCYTES # BLD AUTO: 0.77 K/UL — LOW (ref 1.2–3.4)
LYMPHOCYTES # BLD AUTO: 15.2 % — LOW (ref 20.5–51.1)
MCHC RBC-ENTMCNC: 29.2 PG — SIGNIFICANT CHANGE UP (ref 27–31)
MCHC RBC-ENTMCNC: 32.7 G/DL — SIGNIFICANT CHANGE UP (ref 32–37)
MCV RBC AUTO: 89.6 FL — SIGNIFICANT CHANGE UP (ref 81–99)
MONOCYTES # BLD AUTO: 0.77 K/UL — HIGH (ref 0.1–0.6)
MONOCYTES NFR BLD AUTO: 15.2 % — HIGH (ref 1.7–9.3)
NEUTROPHILS # BLD AUTO: 3.29 K/UL — SIGNIFICANT CHANGE UP (ref 1.4–6.5)
NEUTROPHILS NFR BLD AUTO: 64.9 % — SIGNIFICANT CHANGE UP (ref 42.2–75.2)
NRBC # BLD: 0 /100 WBCS — SIGNIFICANT CHANGE UP (ref 0–0)
PLATELET # BLD AUTO: 125 K/UL — LOW (ref 130–400)
POTASSIUM SERPL-MCNC: 4 MMOL/L — SIGNIFICANT CHANGE UP (ref 3.5–5)
POTASSIUM SERPL-SCNC: 4 MMOL/L — SIGNIFICANT CHANGE UP (ref 3.5–5)
RBC # BLD: 3.83 M/UL — LOW (ref 4.2–5.4)
RBC # FLD: 16 % — HIGH (ref 11.5–14.5)
SODIUM SERPL-SCNC: 138 MMOL/L — SIGNIFICANT CHANGE UP (ref 135–146)
WBC # BLD: 5.07 K/UL — SIGNIFICANT CHANGE UP (ref 4.8–10.8)
WBC # FLD AUTO: 5.07 K/UL — SIGNIFICANT CHANGE UP (ref 4.8–10.8)

## 2022-12-28 PROCEDURE — 99232 SBSQ HOSP IP/OBS MODERATE 35: CPT

## 2022-12-28 RX ADMIN — VALACYCLOVIR 1000 MILLIGRAM(S): 500 TABLET, FILM COATED ORAL at 22:37

## 2022-12-28 RX ADMIN — Medication 1 APPLICATION(S): at 05:45

## 2022-12-28 RX ADMIN — PANTOPRAZOLE SODIUM 40 MILLIGRAM(S): 20 TABLET, DELAYED RELEASE ORAL at 05:44

## 2022-12-28 RX ADMIN — Medication 25 MILLIGRAM(S): at 05:46

## 2022-12-28 RX ADMIN — VALACYCLOVIR 1000 MILLIGRAM(S): 500 TABLET, FILM COATED ORAL at 05:45

## 2022-12-28 RX ADMIN — VALACYCLOVIR 1000 MILLIGRAM(S): 500 TABLET, FILM COATED ORAL at 13:02

## 2022-12-28 RX ADMIN — Medication 1 APPLICATION(S): at 18:31

## 2022-12-28 RX ADMIN — HEPARIN SODIUM 5000 UNIT(S): 5000 INJECTION INTRAVENOUS; SUBCUTANEOUS at 05:44

## 2022-12-28 RX ADMIN — Medication 1 APPLICATION(S): at 18:32

## 2022-12-28 RX ADMIN — HEPARIN SODIUM 5000 UNIT(S): 5000 INJECTION INTRAVENOUS; SUBCUTANEOUS at 18:32

## 2022-12-28 RX ADMIN — Medication 5 MILLIGRAM(S): at 22:03

## 2022-12-28 RX ADMIN — Medication 25 MILLIGRAM(S): at 18:32

## 2022-12-28 RX ADMIN — ATORVASTATIN CALCIUM 20 MILLIGRAM(S): 80 TABLET, FILM COATED ORAL at 22:03

## 2022-12-28 NOTE — PROGRESS NOTE ADULT - SUBJECTIVE AND OBJECTIVE BOX
SULEMA POZO 87y Female  MRN#: [id number]   Hospital Day: 5d    HPI:  Case of 87-year-old female with PMHx descending aortic aneurysm, hx of moderate pericardial effusion presents to ED from [facility] for tachycardia and hypotension and appearing pale as per NH paperwork.  Patient alert and oriented in ED.   On questioning, she says that she was feeling fine with no symptoms related to her abnormal vitals.  On ROS, she says that she has chronic mid back pain.  She denies all fall/trauma, chest pain, shortness of breath, abdominal pain, nausea, vomiting, diarrhea, bloody/dark stools, fever/chills, GI or  symptoms    In ED triage, vitals showed BP 70s/50s and tachycardia in the 130s. EKG showing SVT.  Given on liter LR bolus, vitals recovered afterwards  CT angio chest and abdomen done and Since 9/13/2022: 1. No significant interval change. 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 No evidence of aortic rupture. 2. Unchanged moderate pericardial effusion. Unchanged moderate left, small right bilateral pleural effusions. 3. No evidence of acute intra-abdominal pathology.   (23 Dec 2022 01:15)      SUBJECTIVE  Patient is a 87y old Female who presents with a chief complaint of Tachycardia and Hypotension (27 Dec 2022 09:20)  Currently admitted to medicine with the primary diagnosis of Hypotension      INTERVAL HPI AND OVERNIGHT EVENTS:  Patient was examined and seen at bedside. This morning she is resting comfortably in bed and reports no issues or overnight events. No SOB, cough, fever or chills       OBJECTIVE  PAST MEDICAL & SURGICAL HISTORY  Descending thoracic aortic aneurysm    Pericardial effusion    Pleural effusion      ALLERGIES:  Allergy Status Unknown    MEDICATIONS:  STANDING MEDICATIONS  atorvastatin 20 milliGRAM(s) Oral at bedtime  heparin   Injectable 5000 Unit(s) SubCutaneous every 12 hours  hydrocortisone 1% Cream 1 Application(s) Topical two times a day  melatonin 5 milliGRAM(s) Oral at bedtime  metoprolol tartrate 25 milliGRAM(s) Oral two times a day  pantoprazole    Tablet 40 milliGRAM(s) Oral before breakfast  petrolatum white Ointment 1 Application(s) Topical two times a day  valACYclovir 1000 milliGRAM(s) Oral three times a day    PRN MEDICATIONS  acetaminophen     Tablet .. 650 milliGRAM(s) Oral every 6 hours PRN      VITAL SIGNS: Last 24 Hours  T(C): 36.9 (28 Dec 2022 04:29), Max: 36.9 (28 Dec 2022 04:29)  T(F): 98.5 (28 Dec 2022 04:29), Max: 98.5 (28 Dec 2022 04:29)  HR: 68 (28 Dec 2022 04:29) (64 - 68)  BP: 106/65 (28 Dec 2022 04:29) (104/67 - 106/65)  BP(mean): --  RR: 18 (28 Dec 2022 04:29) (18 - 18)  SpO2: 97% (28 Dec 2022 00:25) (97% - 97%)    LABS:                        11.2   5.07  )-----------( 125      ( 28 Dec 2022 06:34 )             34.3     12-28    138  |  107  |  15  ----------------------------<  91  4.0   |  25  |  0.8    Ca    8.2<L>      28 Dec 2022 06:34    TPro  5.8<L>  /  Alb  3.2<L>  /  TBili  0.6  /  DBili  x   /  AST  16  /  ALT  11  /  AlkPhos  115  12-27      PHYSICAL EXAM:  CONSTITUTIONAL: No acute distress, well-developed, well-groomed, AAOx3  HEAD: Atraumatic, normocephalic  EYES: EOM intact, PERRLA, conjunctiva and sclera clear  ENT: Supple, no masses, no thyromegaly, no bruits, no JVD; moist mucous membranes  PULMONARY: Clear to auscultation bilaterally; no wheezes, rales, or rhonchi  CARDIOVASCULAR: Regular rate and rhythm; no murmurs, rubs, or gallops  GASTROINTESTINAL: Soft, non-tender, non-distended; bowel sounds present  MUSCULOSKELETAL: 2+ peripheral pulses; no clubbing, no cyanosis, no edema  Left upper back papular rash localized in one area does not cross to other side still present   NEUROLOGY: non-focal, no motor or sensory deficit       ASSESSMENT & PLAN  87-year-old female with PMHx descending aortic aneurysm, hx of moderate pericardial effusion presents to ED from [facility] for tachycardia and hypotension and appearing pale as per NH paperwork.  In ED triage, vitals showed BP 70s/50s and tachycardia in the 130s.    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 No evidence of aortic rupture. Unchanged moderate pericardial effusion. Unchanged moderate left, small right bilateral pleural effusions.       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 No evidence of aortic rupture.  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 She is not a candidate for an endovascular repair of the aneurysm. Repair  would require an open operation which carries with it a high risk of cardiopulmonary complications and high risk of paraplegia. Due to her medical comorbidities she is not a suitable candidate for this type of repair. There is no planned intervention at this time  - c/w BB, c/w lipitor   - target SBP < 140 mmhg     #Lower limb edema with erythema  - Suggestive of chronic venous insufficiency with stasis dermatitis    #DVT prophylaxis: Heparin 5000 BID   # Diet: DASH   # Dispo: [facility] tomorrow

## 2022-12-28 NOTE — PHYSICAL THERAPY INITIAL EVALUATION ADULT - GAIT DISTANCE, PT EVAL
sidestepping at EOB for safety; unable to walk further due to pain at L side of back. Patient requested to return to bed./5 feet

## 2022-12-28 NOTE — PHYSICAL THERAPY INITIAL EVALUATION ADULT - GENERAL OBSERVATIONS, REHAB EVAL
PT explained the patient that she was cleared by the MD for participating in transfer and ambulation. But then patient reported that her back was hurting so much and she just wanted to rest. Patient did not give pain scale. RN was notified. PT will f/u when appropriate.
PT IE 10-10:30am. Patient left in supine in NAD. +call bell, +bed alarm, +telemetry, +confusion. Patient presented with frequent distractibility and cannot recall her home setting (unreliable historian).

## 2022-12-28 NOTE — PHYSICAL THERAPY INITIAL EVALUATION ADULT - NSACTIVITYREC_GEN_A_PT
Patient presented with frequent distractibility and cannot recall her home setting (unreliable historian). +Confusion.

## 2022-12-29 LAB
ANION GAP SERPL CALC-SCNC: 11 MMOL/L — SIGNIFICANT CHANGE UP (ref 7–14)
BASOPHILS # BLD AUTO: 0.04 K/UL — SIGNIFICANT CHANGE UP (ref 0–0.2)
BASOPHILS NFR BLD AUTO: 0.5 % — SIGNIFICANT CHANGE UP (ref 0–1)
BUN SERPL-MCNC: 20 MG/DL — SIGNIFICANT CHANGE UP (ref 10–20)
CALCIUM SERPL-MCNC: 8.2 MG/DL — LOW (ref 8.4–10.5)
CHLORIDE SERPL-SCNC: 103 MMOL/L — SIGNIFICANT CHANGE UP (ref 98–110)
CO2 SERPL-SCNC: 23 MMOL/L — SIGNIFICANT CHANGE UP (ref 17–32)
CREAT SERPL-MCNC: 1.3 MG/DL — SIGNIFICANT CHANGE UP (ref 0.7–1.5)
EGFR: 40 ML/MIN/1.73M2 — LOW
EOSINOPHIL # BLD AUTO: 0.08 K/UL — SIGNIFICANT CHANGE UP (ref 0–0.7)
EOSINOPHIL NFR BLD AUTO: 1 % — SIGNIFICANT CHANGE UP (ref 0–8)
GLUCOSE SERPL-MCNC: 104 MG/DL — HIGH (ref 70–99)
HCT VFR BLD CALC: 34.6 % — LOW (ref 37–47)
HGB BLD-MCNC: 11.2 G/DL — LOW (ref 12–16)
IMM GRANULOCYTES NFR BLD AUTO: 0.4 % — HIGH (ref 0.1–0.3)
LYMPHOCYTES # BLD AUTO: 0.9 K/UL — LOW (ref 1.2–3.4)
LYMPHOCYTES # BLD AUTO: 11.7 % — LOW (ref 20.5–51.1)
MCHC RBC-ENTMCNC: 29.5 PG — SIGNIFICANT CHANGE UP (ref 27–31)
MCHC RBC-ENTMCNC: 32.4 G/DL — SIGNIFICANT CHANGE UP (ref 32–37)
MCV RBC AUTO: 91.1 FL — SIGNIFICANT CHANGE UP (ref 81–99)
MONOCYTES # BLD AUTO: 1.1 K/UL — HIGH (ref 0.1–0.6)
MONOCYTES NFR BLD AUTO: 14.3 % — HIGH (ref 1.7–9.3)
NEUTROPHILS # BLD AUTO: 5.56 K/UL — SIGNIFICANT CHANGE UP (ref 1.4–6.5)
NEUTROPHILS NFR BLD AUTO: 72.1 % — SIGNIFICANT CHANGE UP (ref 42.2–75.2)
NRBC # BLD: 0 /100 WBCS — SIGNIFICANT CHANGE UP (ref 0–0)
PLATELET # BLD AUTO: 125 K/UL — LOW (ref 130–400)
POTASSIUM SERPL-MCNC: 3.6 MMOL/L — SIGNIFICANT CHANGE UP (ref 3.5–5)
POTASSIUM SERPL-SCNC: 3.6 MMOL/L — SIGNIFICANT CHANGE UP (ref 3.5–5)
RBC # BLD: 3.8 M/UL — LOW (ref 4.2–5.4)
RBC # FLD: 16.1 % — HIGH (ref 11.5–14.5)
SODIUM SERPL-SCNC: 137 MMOL/L — SIGNIFICANT CHANGE UP (ref 135–146)
WBC # BLD: 7.71 K/UL — SIGNIFICANT CHANGE UP (ref 4.8–10.8)
WBC # FLD AUTO: 7.71 K/UL — SIGNIFICANT CHANGE UP (ref 4.8–10.8)

## 2022-12-29 PROCEDURE — 99233 SBSQ HOSP IP/OBS HIGH 50: CPT

## 2022-12-29 RX ADMIN — PANTOPRAZOLE SODIUM 40 MILLIGRAM(S): 20 TABLET, DELAYED RELEASE ORAL at 06:23

## 2022-12-29 RX ADMIN — Medication 1 APPLICATION(S): at 17:37

## 2022-12-29 RX ADMIN — Medication 25 MILLIGRAM(S): at 17:41

## 2022-12-29 RX ADMIN — Medication 650 MILLIGRAM(S): at 09:30

## 2022-12-29 RX ADMIN — Medication 1 APPLICATION(S): at 06:23

## 2022-12-29 RX ADMIN — HEPARIN SODIUM 5000 UNIT(S): 5000 INJECTION INTRAVENOUS; SUBCUTANEOUS at 06:23

## 2022-12-29 RX ADMIN — Medication 25 MILLIGRAM(S): at 06:23

## 2022-12-29 RX ADMIN — Medication 5 MILLIGRAM(S): at 21:22

## 2022-12-29 RX ADMIN — Medication 1 APPLICATION(S): at 17:26

## 2022-12-29 RX ADMIN — Medication 650 MILLIGRAM(S): at 11:50

## 2022-12-29 RX ADMIN — ATORVASTATIN CALCIUM 20 MILLIGRAM(S): 80 TABLET, FILM COATED ORAL at 21:22

## 2022-12-29 RX ADMIN — VALACYCLOVIR 1000 MILLIGRAM(S): 500 TABLET, FILM COATED ORAL at 13:55

## 2022-12-29 RX ADMIN — VALACYCLOVIR 1000 MILLIGRAM(S): 500 TABLET, FILM COATED ORAL at 06:24

## 2022-12-29 RX ADMIN — HEPARIN SODIUM 5000 UNIT(S): 5000 INJECTION INTRAVENOUS; SUBCUTANEOUS at 17:41

## 2022-12-29 NOTE — PROGRESS NOTE ADULT - ASSESSMENT
87-year-old female with PMHx descending aortic aneurysm, hx of moderate pericardial effusion presents to ED from [facility] for tachycardia and hypotension and appearing pale as per NH paperwork.  In ED triage, vitals showed BP 70s/50s and tachycardia in the 130s.    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 No evidence of aortic rupture. Unchanged moderate pericardial effusion. Unchanged moderate left, small right bilateral pleural effusions.       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 No evidence of aortic rupture.  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 She is not a candidate for an endovascular repair of the aneurysm. Repair  would require an open operation which carries with it a high risk of cardiopulmonary complications and high risk of paraplegia. Due to her medical comorbidities she is not a suitable candidate for this type of repair. There is no planned intervention at this time  - c/w BB, c/w lipitor   - target SBP < 140 mmhg     #Lower limb edema with erythema  - Suggestive of chronic venous insufficiency with stasis dermatitis    #DVT prophylaxis: Heparin 5000 BID   # Diet: DASH   # Dispo: [facility] tomorrow    87-year-old female with PMHx descending aortic aneurysm, hx of moderate pericardial effusion presents to ED from [facility] for tachycardia and hypotension and appearing pale as per NH paperwork.  In ED triage, vitals showed BP 70s/50s and tachycardia in the 130s.    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 No evidence of aortic rupture. Unchanged moderate pericardial effusion. Unchanged moderate left, small right bilateral pleural effusions.       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 No evidence of aortic rupture.  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 She is not a candidate for an endovascular repair of the aneurysm. Repair  would require an open operation which carries with it a high risk of cardiopulmonary complications and high risk of paraplegia. Due to her medical comorbidities she is not a suitable candidate for this type of repair. There is no planned intervention at this time  - c/w BB, c/w lipitor   - target SBP < 140 mmhg     #Lower limb edema with erythema  - Suggestive of chronic venous insufficiency with stasis dermatitis    #DVT prophylaxis: Heparin 5000 BID   # Diet: DASH   # Dispo: [facility] tomorrow

## 2022-12-29 NOTE — PROGRESS NOTE ADULT - REASON FOR ADMISSION
Tachycardia and Hypotension

## 2022-12-29 NOTE — PROGRESS NOTE ADULT - SUBJECTIVE AND OBJECTIVE BOX
SULEMA POZO 87y Female  MRN#: [id number]   Hospital Day: 6d    SUBJECTIVE  Patient is a 87y old Female who presents with a chief complaint of Tachycardia and Hypotension (28 Dec 2022 13:49)  Currently admitted to medicine with the primary diagnosis of Hypotension      INTERVAL HPI AND OVERNIGHT EVENTS:  Patient was examined and seen at bedside. This morning she is resting comfortably in bed and reports no issues or overnight events.    OBJECTIVE  PAST MEDICAL & SURGICAL HISTORY  Descending thoracic aortic aneurysm    Pericardial effusion    Pleural effusion      ALLERGIES:  Allergy Status Unknown    MEDICATIONS:  STANDING MEDICATIONS  atorvastatin 20 milliGRAM(s) Oral at bedtime  heparin   Injectable 5000 Unit(s) SubCutaneous every 12 hours  hydrocortisone 1% Cream 1 Application(s) Topical two times a day  melatonin 5 milliGRAM(s) Oral at bedtime  metoprolol tartrate 25 milliGRAM(s) Oral two times a day  pantoprazole    Tablet 40 milliGRAM(s) Oral before breakfast  petrolatum white Ointment 1 Application(s) Topical two times a day  valACYclovir 1000 milliGRAM(s) Oral three times a day    PRN MEDICATIONS  acetaminophen     Tablet .. 650 milliGRAM(s) Oral every 6 hours PRN      VITAL SIGNS: Last 24 Hours  T(C): 35.8 (29 Dec 2022 04:39), Max: 37.1 (28 Dec 2022 12:13)  T(F): 96.5 (29 Dec 2022 04:39), Max: 98.7 (28 Dec 2022 12:13)  HR: 88 (29 Dec 2022 04:39) (78 - 88)  BP: 128/89 (29 Dec 2022 04:39) (119/77 - 128/89)  BP(mean): --  RR: 18 (29 Dec 2022 04:39) (18 - 18)  SpO2: --    LABS:                        11.2   7.71  )-----------( 125      ( 29 Dec 2022 08:11 )             34.6     12-29    137  |  103  |  20  ----------------------------<  104<H>  3.6   |  23  |  1.3    Ca    8.2<L>      29 Dec 2022 08:11                    RADIOLOGY:      PHYSICAL EXAM:  CONSTITUTIONAL: No acute distress, AAOx3  HEAD: Atraumatic, normocephalic  EYES: EOM intact, PERRLA, conjunctiva and sclera clear  ENT: moist mucous membranes  PULMONARY: Clear to auscultation bilaterally  CARDIOVASCULAR: Regular rate and rhythm  GASTROINTESTINAL: Soft, non-tender, non-distended; bowel sounds present  MUSCULOSKELETAL: no edema  NEUROLOGY: non-focal  SKIN: Rash on the upper right shoulder     SULEMA POZO 87y Female  MRN#: [id number]   Hospital Day: 6d    SUBJECTIVE  Patient is a 87y old Female who presents with a chief complaint of Tachycardia and Hypotension (28 Dec 2022 13:49)  Currently admitted to medicine with the primary diagnosis of Hypotension      INTERVAL HPI AND OVERNIGHT EVENTS:  Patient was examined and seen at bedside. This morning she is resting comfortably in bed and reports no issues or overnight events.    OBJECTIVE  PAST MEDICAL & SURGICAL HISTORY  Descending thoracic aortic aneurysm    Pericardial effusion    Pleural effusion      ALLERGIES:  Allergy Status Unknown    MEDICATIONS:  STANDING MEDICATIONS  atorvastatin 20 milliGRAM(s) Oral at bedtime  heparin   Injectable 5000 Unit(s) SubCutaneous every 12 hours  hydrocortisone 1% Cream 1 Application(s) Topical two times a day  melatonin 5 milliGRAM(s) Oral at bedtime  metoprolol tartrate 25 milliGRAM(s) Oral two times a day  pantoprazole    Tablet 40 milliGRAM(s) Oral before breakfast  petrolatum white Ointment 1 Application(s) Topical two times a day  valACYclovir 1000 milliGRAM(s) Oral three times a day    PRN MEDICATIONS  acetaminophen     Tablet .. 650 milliGRAM(s) Oral every 6 hours PRN      VITAL SIGNS: Last 24 Hours  T(C): 35.8 (29 Dec 2022 04:39), Max: 37.1 (28 Dec 2022 12:13)  T(F): 96.5 (29 Dec 2022 04:39), Max: 98.7 (28 Dec 2022 12:13)  HR: 88 (29 Dec 2022 04:39) (78 - 88)  BP: 128/89 (29 Dec 2022 04:39) (119/77 - 128/89)  BP(mean): --  RR: 18 (29 Dec 2022 04:39) (18 - 18)  SpO2: --    LABS:                        11.2   7.71  )-----------( 125      ( 29 Dec 2022 08:11 )             34.6     12-29    137  |  103  |  20  ----------------------------<  104<H>  3.6   |  23  |  1.3    Ca    8.2<L>      29 Dec 2022 08:11                    RADIOLOGY:      PHYSICAL EXAM:  CONSTITUTIONAL: No acute distress,   HEAD: Atraumatic, normocephalic  EYES: EOM intact, PERRLA, conjunctiva and sclera clear  ENT: moist mucous membranes  PULMONARY: Clear to auscultation bilaterally  CARDIOVASCULAR: Regular rate and rhythm  GASTROINTESTINAL: Soft, non-tender, non-distended; bowel sounds present  MUSCULOSKELETAL: no edema  NEUROLOGY: non-focal  SKIN: Rash on the upper right shoulder  PSYCH: AAOx3
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
## 2022-12-30 LAB
ALBUMIN SERPL ELPH-MCNC: 3.2 G/DL — LOW (ref 3.5–5.2)
ALP SERPL-CCNC: 117 U/L — HIGH (ref 30–115)
ALT FLD-CCNC: 15 U/L — SIGNIFICANT CHANGE UP (ref 0–41)
ANION GAP SERPL CALC-SCNC: 12 MMOL/L — SIGNIFICANT CHANGE UP (ref 7–14)
AST SERPL-CCNC: 20 U/L — SIGNIFICANT CHANGE UP (ref 0–41)
BASOPHILS # BLD AUTO: 0.02 K/UL — SIGNIFICANT CHANGE UP (ref 0–0.2)
BASOPHILS NFR BLD AUTO: 0.3 % — SIGNIFICANT CHANGE UP (ref 0–1)
BILIRUB SERPL-MCNC: 0.5 MG/DL — SIGNIFICANT CHANGE UP (ref 0.2–1.2)
BUN SERPL-MCNC: 18 MG/DL — SIGNIFICANT CHANGE UP (ref 10–20)
CALCIUM SERPL-MCNC: 8.1 MG/DL — LOW (ref 8.4–10.5)
CHLORIDE SERPL-SCNC: 106 MMOL/L — SIGNIFICANT CHANGE UP (ref 98–110)
CO2 SERPL-SCNC: 21 MMOL/L — SIGNIFICANT CHANGE UP (ref 17–32)
CREAT SERPL-MCNC: 0.9 MG/DL — SIGNIFICANT CHANGE UP (ref 0.7–1.5)
EGFR: 63 ML/MIN/1.73M2 — SIGNIFICANT CHANGE UP
EOSINOPHIL # BLD AUTO: 0.15 K/UL — SIGNIFICANT CHANGE UP (ref 0–0.7)
EOSINOPHIL NFR BLD AUTO: 2.2 % — SIGNIFICANT CHANGE UP (ref 0–8)
GLUCOSE SERPL-MCNC: 105 MG/DL — HIGH (ref 70–99)
HCT VFR BLD CALC: 33.8 % — LOW (ref 37–47)
HGB BLD-MCNC: 11 G/DL — LOW (ref 12–16)
IMM GRANULOCYTES NFR BLD AUTO: 0.3 % — SIGNIFICANT CHANGE UP (ref 0.1–0.3)
LYMPHOCYTES # BLD AUTO: 0.67 K/UL — LOW (ref 1.2–3.4)
LYMPHOCYTES # BLD AUTO: 9.6 % — LOW (ref 20.5–51.1)
MAGNESIUM SERPL-MCNC: 1.8 MG/DL — SIGNIFICANT CHANGE UP (ref 1.8–2.4)
MCHC RBC-ENTMCNC: 29.5 PG — SIGNIFICANT CHANGE UP (ref 27–31)
MCHC RBC-ENTMCNC: 32.5 G/DL — SIGNIFICANT CHANGE UP (ref 32–37)
MCV RBC AUTO: 90.6 FL — SIGNIFICANT CHANGE UP (ref 81–99)
MONOCYTES # BLD AUTO: 0.82 K/UL — HIGH (ref 0.1–0.6)
MONOCYTES NFR BLD AUTO: 11.8 % — HIGH (ref 1.7–9.3)
NEUTROPHILS # BLD AUTO: 5.28 K/UL — SIGNIFICANT CHANGE UP (ref 1.4–6.5)
NEUTROPHILS NFR BLD AUTO: 75.8 % — HIGH (ref 42.2–75.2)
NRBC # BLD: 0 /100 WBCS — SIGNIFICANT CHANGE UP (ref 0–0)
PLATELET # BLD AUTO: 148 K/UL — SIGNIFICANT CHANGE UP (ref 130–400)
POTASSIUM SERPL-MCNC: 4.1 MMOL/L — SIGNIFICANT CHANGE UP (ref 3.5–5)
POTASSIUM SERPL-SCNC: 4.1 MMOL/L — SIGNIFICANT CHANGE UP (ref 3.5–5)
PROT SERPL-MCNC: 5.6 G/DL — LOW (ref 6–8)
RBC # BLD: 3.73 M/UL — LOW (ref 4.2–5.4)
RBC # FLD: 16 % — HIGH (ref 11.5–14.5)
SODIUM SERPL-SCNC: 139 MMOL/L — SIGNIFICANT CHANGE UP (ref 135–146)
WBC # BLD: 6.96 K/UL — SIGNIFICANT CHANGE UP (ref 4.8–10.8)
WBC # FLD AUTO: 6.96 K/UL — SIGNIFICANT CHANGE UP (ref 4.8–10.8)

## 2022-12-30 PROCEDURE — 99239 HOSP IP/OBS DSCHRG MGMT >30: CPT

## 2022-12-30 RX ORDER — ATORVASTATIN CALCIUM 80 MG/1
1 TABLET, FILM COATED ORAL
Qty: 0 | Refills: 0 | DISCHARGE
Start: 2022-12-30

## 2022-12-30 RX ORDER — METOPROLOL TARTRATE 50 MG
1 TABLET ORAL
Qty: 0 | Refills: 0 | DISCHARGE
Start: 2022-12-30

## 2022-12-30 RX ADMIN — HEPARIN SODIUM 5000 UNIT(S): 5000 INJECTION INTRAVENOUS; SUBCUTANEOUS at 06:14

## 2022-12-30 RX ADMIN — Medication 1 APPLICATION(S): at 17:18

## 2022-12-30 RX ADMIN — Medication 25 MILLIGRAM(S): at 06:14

## 2022-12-30 RX ADMIN — Medication 1 APPLICATION(S): at 06:14

## 2022-12-30 RX ADMIN — ATORVASTATIN CALCIUM 20 MILLIGRAM(S): 80 TABLET, FILM COATED ORAL at 21:55

## 2022-12-30 RX ADMIN — Medication 25 MILLIGRAM(S): at 17:20

## 2022-12-30 RX ADMIN — PANTOPRAZOLE SODIUM 40 MILLIGRAM(S): 20 TABLET, DELAYED RELEASE ORAL at 06:14

## 2022-12-30 RX ADMIN — HEPARIN SODIUM 5000 UNIT(S): 5000 INJECTION INTRAVENOUS; SUBCUTANEOUS at 17:18

## 2022-12-30 RX ADMIN — Medication 5 MILLIGRAM(S): at 21:55

## 2022-12-30 NOTE — DISCHARGE NOTE NURSING/CASE MANAGEMENT/SOCIAL WORK - PATIENT PORTAL LINK FT
You can access the FollowMyHealth Patient Portal offered by [facility] by registering at the following website: [email or web address]. By joining [employer]’s FollowMyHealth portal, you will also be able to view your health information using other applications (apps) compatible with our system.

## 2022-12-30 NOTE — DISCHARGE NOTE PROVIDER - NSDCPNSUBOBJ_GEN_ALL_CORE
Pt seen and examined at bedside. Pt feels well. rash on shoulder itches, no pain, suspect rash is due to excoriation from scratching. nFamily aware of dicsharge. Medically stable for dc.

## 2022-12-30 NOTE — DISCHARGE NOTE PROVIDER - NSDCMRMEDTOKEN_GEN_ALL_CORE_FT
atorvastatin 20 mg oral tablet: 1 tab(s) orally once a day (at bedtime)  melatonin 5 mg oral tablet: 1 tab(s) orally once a day (at bedtime), As needed, Insomnia  Metoprolol Tartrate 25 mg oral tablet: 1 tab(s) orally 2 times a day  pantoprazole 40 mg oral delayed release tablet: 1 tab(s) orally once a day (before a meal)

## 2022-12-30 NOTE — DISCHARGE NOTE PROVIDER - CARE PROVIDERS DIRECT ADDRESSES
[email or web address]

## 2022-12-30 NOTE — DISCHARGE NOTE PROVIDER - HOSPITAL COURSE
87-year-old female with PMHx descending aortic aneurysm, hx of moderate pericardial effusion presents to ED from [facility] for tachycardia and hypotension and appearing pale as per NH paperwork.  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 No evidence of aortic rupture. Unchanged moderate pericardial effusion. Unchanged moderate left, small right bilateral pleural effusions.     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 No evidence of aortic rupture.  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 She is not a candidate for an endovascular repair of the aneurysm. Repair  would require an open operation which carries with it a high risk of cardiopulmonary complications and high risk of paraplegia. Due to her medical comorbidities she is not a suitable candidate for this type of repair. There is no planned intervention at this time  - c/w BB, c/w lipitor   - target SBP < 140 mmhg     #Lower limb edema with erythema  - Suggestive of chronic venous insufficiency with stasis dermatitis

## 2022-12-30 NOTE — DISCHARGE NOTE PROVIDER - ATTENDING DISCHARGE PHYSICAL EXAMINATION:
PHYSICAL EXAM:  GENERAL: NAD, speaks in full sentences, no signs of respiratory distress  HEAD:  Atraumatic, Normocephalic  EYES: EOMI, PERRLA, conjunctiva and sclera clear  NECK: Supple, No JVD  CHEST/LUNG: Clear to auscultation bilaterally; No wheeze; No crackles; No accessory muscles used  HEART: Regular rate and rhythm; No murmurs;   ABDOMEN: Soft, Nontender, Nondistended; Bowel sounds present; No guarding  EXTREMITIES:  2+ Peripheral Pulses, No cyanosis or edema  PSYCH: AAOx3 but confused to how she got here  NEUROLOGY: non-focal  SKIN: No rashes or lesions

## 2022-12-30 NOTE — DISCHARGE NOTE PROVIDER - NSDCCPCAREPLAN_GEN_ALL_CORE_FT
PRINCIPAL DISCHARGE DIAGNOSIS  Diagnosis: Hypotension  Assessment and Plan of Treatment: # You presented to us with Tacycardia, Hypotension. You didnt had fever, and hypotension was resolved with 1 LR.   You had moderate pericardial effusion. TTE showed persistent effusion but no signs of effusion, moderate AS, EF is 60 % G2DD  # You also had descending aortic anuerysm measuring 6.6 x 6.4 cm but due to other medical comorbidities you are not a suitable candidate for repair. Please continue medical management.   # Rash on the left upper extremity which is resolving. Doesnt look like HSV.      SECONDARY DISCHARGE DIAGNOSES  Diagnosis: Tachycardia  Assessment and Plan of Treatment:

## 2022-12-30 NOTE — DISCHARGE NOTE PROVIDER - POSTFACE STATEMENT FOR MINUTES SPENT
Patient Instructions by Mathew Fuller MD at 02/23/17 08:56 AM     Author:  Mathew Fuller MD Service:  (none) Author Type:  Physician     Filed:  02/23/17 08:57 AM Encounter Date:  2/23/2017 Status:  Signed     :  Mathew Fuller MD (Physician)            PLAN:    Follow up  in October to discuss cataract surgery    Additional Educational Resources:  For additional resources regarding your symptoms, diagnosis, or further health information, please visit the Health Resources section on [email or web address] or the Online Health Resources section in [employer].        Revision History        User Key Date/Time User Provider Type Action    > [N/A] 02/23/17 08:57 AM Mathew Fuller MD Physician Sign            
minutes on the discharge service.

## 2022-12-30 NOTE — DISCHARGE NOTE PROVIDER - PROVIDER TOKENS
PROVIDER:[TOKEN:[[id number]],FOLLOWUP:[1 week]],PROVIDER:[TOKEN:[[id number]],FOLLOWUP:[1 week]]

## 2022-12-30 NOTE — DISCHARGE NOTE NURSING/CASE MANAGEMENT/SOCIAL WORK - NSDCPEFALRISK_GEN_ALL_CORE
For information on Fall & Injury Prevention, visit: [email or web address] OR  [email or web address] OR  https://www.cdc.gov/steadi/patient.html

## 2022-12-30 NOTE — DISCHARGE NOTE PROVIDER - CARE PROVIDER_API CALL
Josleito Anthony)  Cardiovascular Disease; Internal Medicine; Interventional Cardiology; Nuclear Cardiology  [address], [address]  [place] [address]  Phone: [phone]  Fax: [phone]  Follow Up Time: 1 week    Flower Guevara)  Internal Medicine  [address] [place] [address]  Phone: [phone]  Fax: [phone]  Follow Up Time: 1 week

## 2022-12-31 LAB
ANION GAP SERPL CALC-SCNC: 8 MMOL/L — SIGNIFICANT CHANGE UP (ref 7–14)
BUN SERPL-MCNC: 14 MG/DL — SIGNIFICANT CHANGE UP (ref 10–20)
CALCIUM SERPL-MCNC: 8.1 MG/DL — LOW (ref 8.4–10.4)
CHLORIDE SERPL-SCNC: 106 MMOL/L — SIGNIFICANT CHANGE UP (ref 98–110)
CO2 SERPL-SCNC: 25 MMOL/L — SIGNIFICANT CHANGE UP (ref 17–32)
CREAT SERPL-MCNC: 0.9 MG/DL — SIGNIFICANT CHANGE UP (ref 0.7–1.5)
EGFR: 63 ML/MIN/1.73M2 — SIGNIFICANT CHANGE UP
GLUCOSE SERPL-MCNC: 92 MG/DL — SIGNIFICANT CHANGE UP (ref 70–99)
HCT VFR BLD CALC: 32.2 % — LOW (ref 37–47)
HGB BLD-MCNC: 10.3 G/DL — LOW (ref 12–16)
MAGNESIUM SERPL-MCNC: 1.7 MG/DL — LOW (ref 1.8–2.4)
MCHC RBC-ENTMCNC: 29 PG — SIGNIFICANT CHANGE UP (ref 27–31)
MCHC RBC-ENTMCNC: 32 G/DL — SIGNIFICANT CHANGE UP (ref 32–37)
MCV RBC AUTO: 90.7 FL — SIGNIFICANT CHANGE UP (ref 81–99)
NRBC # BLD: 0 /100 WBCS — SIGNIFICANT CHANGE UP (ref 0–0)
PLATELET # BLD AUTO: 160 K/UL — SIGNIFICANT CHANGE UP (ref 130–400)
POTASSIUM SERPL-MCNC: 4.6 MMOL/L — SIGNIFICANT CHANGE UP (ref 3.5–5)
POTASSIUM SERPL-SCNC: 4.6 MMOL/L — SIGNIFICANT CHANGE UP (ref 3.5–5)
RBC # BLD: 3.55 M/UL — LOW (ref 4.2–5.4)
RBC # FLD: 15.9 % — HIGH (ref 11.5–14.5)
SODIUM SERPL-SCNC: 139 MMOL/L — SIGNIFICANT CHANGE UP (ref 135–146)
WBC # BLD: 6.78 K/UL — SIGNIFICANT CHANGE UP (ref 4.8–10.8)
WBC # FLD AUTO: 6.78 K/UL — SIGNIFICANT CHANGE UP (ref 4.8–10.8)

## 2022-12-31 RX ADMIN — Medication 25 MILLIGRAM(S): at 06:30

## 2022-12-31 RX ADMIN — Medication 1 APPLICATION(S): at 06:33

## 2022-12-31 RX ADMIN — Medication 5 MILLIGRAM(S): at 21:51

## 2022-12-31 RX ADMIN — PANTOPRAZOLE SODIUM 40 MILLIGRAM(S): 20 TABLET, DELAYED RELEASE ORAL at 06:30

## 2022-12-31 RX ADMIN — HEPARIN SODIUM 5000 UNIT(S): 5000 INJECTION INTRAVENOUS; SUBCUTANEOUS at 06:30

## 2022-12-31 RX ADMIN — Medication 1 APPLICATION(S): at 06:31

## 2022-12-31 RX ADMIN — Medication 1 APPLICATION(S): at 17:29

## 2022-12-31 RX ADMIN — Medication 25 MILLIGRAM(S): at 17:28

## 2022-12-31 RX ADMIN — ATORVASTATIN CALCIUM 20 MILLIGRAM(S): 80 TABLET, FILM COATED ORAL at 21:51

## 2022-12-31 RX ADMIN — HEPARIN SODIUM 5000 UNIT(S): 5000 INJECTION INTRAVENOUS; SUBCUTANEOUS at 17:29

## 2022-12-31 NOTE — CHART NOTE - NSCHARTNOTEFT_GEN_A_CORE
Pt was discharged yesterday but stayed overnight due to transportation issues. Pt scheduled to leave today. Pt was discharged yesterday but stayed overnight due to transportation issues. Pt scheduled to leave today. Janett Daisha notified and aware.

## 2023-01-01 VITALS
HEART RATE: 80 BPM | RESPIRATION RATE: 18 BRPM | OXYGEN SATURATION: 95 % | TEMPERATURE: 98 F | DIASTOLIC BLOOD PRESSURE: 74 MMHG | SYSTOLIC BLOOD PRESSURE: 110 MMHG | WEIGHT: 101.85 LBS

## 2023-01-01 LAB
ALBUMIN SERPL ELPH-MCNC: 3.1 G/DL — LOW (ref 3.5–5.2)
ALP SERPL-CCNC: 118 U/L — HIGH (ref 30–115)
ALT FLD-CCNC: 13 U/L — SIGNIFICANT CHANGE UP (ref 0–41)
ANION GAP SERPL CALC-SCNC: 11 MMOL/L — SIGNIFICANT CHANGE UP (ref 7–14)
AST SERPL-CCNC: 15 U/L — SIGNIFICANT CHANGE UP (ref 0–41)
BASOPHILS # BLD AUTO: 0.02 K/UL — SIGNIFICANT CHANGE UP (ref 0–0.2)
BASOPHILS NFR BLD AUTO: 0.3 % — SIGNIFICANT CHANGE UP (ref 0–1)
BILIRUB SERPL-MCNC: 0.6 MG/DL — SIGNIFICANT CHANGE UP (ref 0.2–1.2)
BUN SERPL-MCNC: 16 MG/DL — SIGNIFICANT CHANGE UP (ref 10–20)
CALCIUM SERPL-MCNC: 8.2 MG/DL — LOW (ref 8.4–10.5)
CHLORIDE SERPL-SCNC: 102 MMOL/L — SIGNIFICANT CHANGE UP (ref 98–110)
CO2 SERPL-SCNC: 22 MMOL/L — SIGNIFICANT CHANGE UP (ref 17–32)
CREAT SERPL-MCNC: 0.8 MG/DL — SIGNIFICANT CHANGE UP (ref 0.7–1.5)
EGFR: 72 ML/MIN/1.73M2 — SIGNIFICANT CHANGE UP
EOSINOPHIL # BLD AUTO: 0.12 K/UL — SIGNIFICANT CHANGE UP (ref 0–0.7)
EOSINOPHIL NFR BLD AUTO: 1.9 % — SIGNIFICANT CHANGE UP (ref 0–8)
GLUCOSE SERPL-MCNC: 100 MG/DL — HIGH (ref 70–99)
HCT VFR BLD CALC: 33 % — LOW (ref 37–47)
HGB BLD-MCNC: 10.8 G/DL — LOW (ref 12–16)
IMM GRANULOCYTES NFR BLD AUTO: 0.5 % — HIGH (ref 0.1–0.3)
LYMPHOCYTES # BLD AUTO: 0.78 K/UL — LOW (ref 1.2–3.4)
LYMPHOCYTES # BLD AUTO: 12.4 % — LOW (ref 20.5–51.1)
MAGNESIUM SERPL-MCNC: 1.8 MG/DL — SIGNIFICANT CHANGE UP (ref 1.8–2.4)
MCHC RBC-ENTMCNC: 29.5 PG — SIGNIFICANT CHANGE UP (ref 27–31)
MCHC RBC-ENTMCNC: 32.7 G/DL — SIGNIFICANT CHANGE UP (ref 32–37)
MCV RBC AUTO: 90.2 FL — SIGNIFICANT CHANGE UP (ref 81–99)
MONOCYTES # BLD AUTO: 0.91 K/UL — HIGH (ref 0.1–0.6)
MONOCYTES NFR BLD AUTO: 14.5 % — HIGH (ref 1.7–9.3)
NEUTROPHILS # BLD AUTO: 4.41 K/UL — SIGNIFICANT CHANGE UP (ref 1.4–6.5)
NEUTROPHILS NFR BLD AUTO: 70.4 % — SIGNIFICANT CHANGE UP (ref 42.2–75.2)
NRBC # BLD: 0 /100 WBCS — SIGNIFICANT CHANGE UP (ref 0–0)
PLATELET # BLD AUTO: 160 K/UL — SIGNIFICANT CHANGE UP (ref 130–400)
POTASSIUM SERPL-MCNC: 4.1 MMOL/L — SIGNIFICANT CHANGE UP (ref 3.5–5)
POTASSIUM SERPL-SCNC: 4.1 MMOL/L — SIGNIFICANT CHANGE UP (ref 3.5–5)
PROT SERPL-MCNC: 5.3 G/DL — LOW (ref 6–8)
RBC # BLD: 3.66 M/UL — LOW (ref 4.2–5.4)
RBC # FLD: 15.7 % — HIGH (ref 11.5–14.5)
SODIUM SERPL-SCNC: 135 MMOL/L — SIGNIFICANT CHANGE UP (ref 135–146)
WBC # BLD: 6.27 K/UL — SIGNIFICANT CHANGE UP (ref 4.8–10.8)
WBC # FLD AUTO: 6.27 K/UL — SIGNIFICANT CHANGE UP (ref 4.8–10.8)

## 2023-01-01 RX ADMIN — HEPARIN SODIUM 5000 UNIT(S): 5000 INJECTION INTRAVENOUS; SUBCUTANEOUS at 05:57

## 2023-01-01 RX ADMIN — Medication 25 MILLIGRAM(S): at 05:57

## 2023-01-01 RX ADMIN — Medication 1 APPLICATION(S): at 05:59

## 2023-01-01 RX ADMIN — PANTOPRAZOLE SODIUM 40 MILLIGRAM(S): 20 TABLET, DELAYED RELEASE ORAL at 05:58

## 2023-01-01 NOTE — CHART NOTE - NSCHARTNOTEFT_GEN_A_CORE
Due to transporation issues pt remained in hospital again. Pt left this morning prior to me seeing her.

## 2023-02-07 PROBLEM — Z00.00 ENCOUNTER FOR PREVENTIVE HEALTH EXAMINATION: Status: ACTIVE | Noted: 2023-02-07

## 2024-05-02 NOTE — PROGRESS NOTE ADULT - ATTENDING SUPERVISION STATEMENT
Increase Mounjaro to 7.5mg weekly.    Stop Glipizide 10mg tablets and begin 2 Glipizide ER 5mg tablets daily. If having any true low blood sugars less than 80, reduce to 1 tablet daily. If you're still having low blood sugars, stop glipizide.    Continue pioglitazone. If leg swelling please let me know.    If any steroid injections, double glipizide dose for 3 days.   
Resident
